# Patient Record
Sex: MALE | Race: WHITE | Employment: UNEMPLOYED | URBAN - METROPOLITAN AREA
[De-identification: names, ages, dates, MRNs, and addresses within clinical notes are randomized per-mention and may not be internally consistent; named-entity substitution may affect disease eponyms.]

---

## 2018-04-29 ENCOUNTER — ED HISTORICAL/CONVERTED ENCOUNTER (OUTPATIENT)
Dept: OTHER | Age: 28
End: 2018-04-29

## 2019-03-19 ENCOUNTER — HOSPITAL ENCOUNTER (EMERGENCY)
Age: 29
Discharge: HOME | End: 2019-03-19
Attending: EMERGENCY MEDICINE
Payer: MEDICARE

## 2019-03-19 ENCOUNTER — APPOINTMENT (OUTPATIENT)
Dept: GENERAL RADIOLOGY | Age: 29
End: 2019-03-19
Attending: EMERGENCY MEDICINE
Payer: MEDICARE

## 2019-03-19 VITALS
RESPIRATION RATE: 19 BRPM | DIASTOLIC BLOOD PRESSURE: 120 MMHG | BODY MASS INDEX: 35.21 KG/M2 | WEIGHT: 260 LBS | HEART RATE: 103 BPM | HEIGHT: 72 IN | TEMPERATURE: 97.6 F | OXYGEN SATURATION: 95 % | SYSTOLIC BLOOD PRESSURE: 174 MMHG

## 2019-03-19 DIAGNOSIS — T50.901A DRUG OVERDOSE, ACCIDENTAL OR UNINTENTIONAL, INITIAL ENCOUNTER: Primary | ICD-10-CM

## 2019-03-19 LAB
ALBUMIN SERPL-MCNC: 3.8 G/DL (ref 3.5–5)
ALBUMIN/GLOB SERPL: 1.1 {RATIO} (ref 1.1–2.2)
ALP SERPL-CCNC: 105 U/L (ref 45–117)
ALT SERPL-CCNC: 52 U/L (ref 12–78)
ANION GAP SERPL CALC-SCNC: 7 MMOL/L (ref 5–15)
AST SERPL-CCNC: 46 U/L (ref 15–37)
BASE DEFICIT BLDV-SCNC: 1.8 MMOL/L
BASOPHILS # BLD: 0 K/UL (ref 0–0.1)
BASOPHILS NFR BLD: 0 % (ref 0–1)
BDY SITE: NORMAL
BILIRUB SERPL-MCNC: 0.1 MG/DL (ref 0.2–1)
BUN SERPL-MCNC: 10 MG/DL (ref 6–20)
BUN/CREAT SERPL: 10 (ref 12–20)
CALCIUM SERPL-MCNC: 8.5 MG/DL (ref 8.5–10.1)
CHLORIDE SERPL-SCNC: 106 MMOL/L (ref 97–108)
CO2 SERPL-SCNC: 25 MMOL/L (ref 21–32)
COMMENT, HOLDF: NORMAL
CREAT SERPL-MCNC: 1.05 MG/DL (ref 0.7–1.3)
DIFFERENTIAL METHOD BLD: ABNORMAL
EOSINOPHIL # BLD: 0 K/UL (ref 0–0.4)
EOSINOPHIL NFR BLD: 0 % (ref 0–7)
ERYTHROCYTE [DISTWIDTH] IN BLOOD BY AUTOMATED COUNT: 13.6 % (ref 11.5–14.5)
ETHANOL SERPL-MCNC: <10 MG/DL
GLOBULIN SER CALC-MCNC: 3.5 G/DL (ref 2–4)
GLUCOSE SERPL-MCNC: 179 MG/DL (ref 65–100)
HCO3 BLDV-SCNC: 24 MMOL/L (ref 23–28)
HCT VFR BLD AUTO: 42.1 % (ref 36.6–50.3)
HGB BLD-MCNC: 13.5 G/DL (ref 12.1–17)
IMM GRANULOCYTES # BLD AUTO: 0 K/UL
IMM GRANULOCYTES NFR BLD AUTO: 0 %
LYMPHOCYTES # BLD: 2 K/UL (ref 0.8–3.5)
LYMPHOCYTES NFR BLD: 23 % (ref 12–49)
MCH RBC QN AUTO: 27.1 PG (ref 26–34)
MCHC RBC AUTO-ENTMCNC: 32.1 G/DL (ref 30–36.5)
MCV RBC AUTO: 84.5 FL (ref 80–99)
MONOCYTES # BLD: 1.1 K/UL (ref 0–1)
MONOCYTES NFR BLD: 12 % (ref 5–13)
NEUTS BAND NFR BLD MANUAL: 2 % (ref 0–6)
NEUTS SEG # BLD: 5.7 K/UL (ref 1.8–8)
NEUTS SEG NFR BLD: 63 % (ref 32–75)
NRBC # BLD: 0 K/UL (ref 0–0.01)
NRBC BLD-RTO: 0 PER 100 WBC
PCO2 BLDV: 45 MMHG (ref 41–51)
PH BLDV: 7.35 [PH] (ref 7.32–7.42)
PLATELET # BLD AUTO: 223 K/UL (ref 150–400)
PMV BLD AUTO: 9.5 FL (ref 8.9–12.9)
PO2 BLDV: 38 MMHG (ref 25–40)
POTASSIUM SERPL-SCNC: 4.5 MMOL/L (ref 3.5–5.1)
PROT SERPL-MCNC: 7.3 G/DL (ref 6.4–8.2)
RBC # BLD AUTO: 4.98 M/UL (ref 4.1–5.7)
RBC MORPH BLD: ABNORMAL
SAMPLES BEING HELD,HOLD: NORMAL
SAO2 % BLDV: 70 % (ref 65–88)
SAO2% DEVICE SAO2% SENSOR NAME: NORMAL
SODIUM SERPL-SCNC: 138 MMOL/L (ref 136–145)
SPECIMEN SITE: NORMAL
WBC # BLD AUTO: 8.8 K/UL (ref 4.1–11.1)

## 2019-03-19 PROCEDURE — 36415 COLL VENOUS BLD VENIPUNCTURE: CPT

## 2019-03-19 PROCEDURE — 74011250637 HC RX REV CODE- 250/637: Performed by: EMERGENCY MEDICINE

## 2019-03-19 PROCEDURE — 82803 BLOOD GASES ANY COMBINATION: CPT

## 2019-03-19 PROCEDURE — 85025 COMPLETE CBC W/AUTO DIFF WBC: CPT

## 2019-03-19 PROCEDURE — 99285 EMERGENCY DEPT VISIT HI MDM: CPT

## 2019-03-19 PROCEDURE — 80053 COMPREHEN METABOLIC PANEL: CPT

## 2019-03-19 PROCEDURE — 71045 X-RAY EXAM CHEST 1 VIEW: CPT

## 2019-03-19 PROCEDURE — 80307 DRUG TEST PRSMV CHEM ANLYZR: CPT

## 2019-03-19 PROCEDURE — 93005 ELECTROCARDIOGRAM TRACING: CPT

## 2019-03-19 RX ORDER — ONDANSETRON 4 MG/1
8 TABLET, ORALLY DISINTEGRATING ORAL
Status: COMPLETED | OUTPATIENT
Start: 2019-03-19 | End: 2019-03-19

## 2019-03-19 RX ORDER — NALOXONE HYDROCHLORIDE 1 MG/ML
INJECTION INTRAMUSCULAR; INTRAVENOUS; SUBCUTANEOUS
Status: DISCONTINUED
Start: 2019-03-19 | End: 2019-03-19 | Stop reason: HOSPADM

## 2019-03-19 RX ORDER — ONDANSETRON 4 MG/1
TABLET, ORALLY DISINTEGRATING ORAL
Status: DISCONTINUED
Start: 2019-03-19 | End: 2019-03-19 | Stop reason: HOSPADM

## 2019-03-19 RX ADMIN — ONDANSETRON 8 MG: 4 TABLET, ORALLY DISINTEGRATING ORAL at 20:12

## 2019-03-19 NOTE — DISCHARGE INSTRUCTIONS
Stop using Kratom and Marijuana. Patient Education        Opioid Overdose: Care Instructions  Your Care Instructions    You have had treatment to help your body recover from taking too much of an opioid. You are getting better, but you may not feel well for a while. It takes time for the opioids to leave your body. How long it takes to feel better depends on which drug you took and how much you took of it. Opioids include illegal drugs such as heroin, often called smack, junk, H, and ska. Opioids also include medicines that doctors prescribe to treat pain. These are medicines such as oxycodone, methadone, and buprenorphine. They are sometimes sold and used illegally. Taking too much of an opioid can be dangerous. It may cause:  · Trouble breathing. · Low blood pressure. · A low heart rate. · A coma. When the doctor treated you for the overdose, he or she may have:  · Watched your symptoms or done tests to find out what kind of drug you took. · Given you fluids. · Given you oxygen to help you breathe. · Given you a medicine called naloxone to help reverse the effects of the opioid. · Done several tests, including blood tests, to see how you're responding to treatment. The doctor also watched you carefully to make sure you were recovering safely. Follow-up care is a key part of your treatment and safety. Be sure to make and go to all appointments, and call your doctor if you are having problems. It's also a good idea to know your test results and keep a list of the medicines you take. How can you care for yourself at home? · If you take opioids regularly, your body gets used to them. This is called dependency. If you are dependent on this drug, you may have withdrawal symptoms when you stop taking it. These can include nausea, sweating, chills, diarrhea, stomach cramps, and muscle aches. Withdrawal can last up to several weeks, depending on which drug you took and how long you took it.  You may feel very ill, but you are probably not in medical danger. · Your doctor may give you medicine to help you feel better. To help get through withdrawal, you can also:  ? Get plenty of rest.  ? Drink plenty of fluids. ? Stay active, but don't tire yourself. ? Eat a healthy diet. · If you had a tube in your throat to help you breathe, you may have a sore throat or hoarseness that can last a few days. Sip liquids to help soothe your throat. · Do not drink alcohol or take illegal drugs. · Do not take medications that make you tired, like sleeping pills or muscle relaxers. · Do not drive if you feel sleepy or groggy while you recover from your overdose. · Get help to stop using drugs. Talk to your doctor about drug treatment programs. · Talk to your doctor or pharmacist about having a naloxone rescue kit on hand. When should you call for help? Call 911 anytime you think you may need emergency care. For example, call if:    · You feel you cannot stop from hurting yourself or someone else.   [facility] your doctor now or seek immediate medical care if:    · You have new or worse withdrawal symptoms, such as:  ? Stomach cramps. ? Vomiting. ? Diarrhea. ? Muscle aches. ? Sweating.    Watch closely for changes in your health, and be sure to contact your doctor if:    · You do not get better as expected. Where can you learn more? Go to http://skyler-mike.info/. Enter 078 [id number] in the search box to learn more about \"Opioid Overdose: Care Instructions. \"  Current as of: May 7, 2018  Content Version: 11.9  © 3858-4163 [employer]. Care instructions adapted under license by [employer] (which disclaims liability or warranty for this information). If you have questions about a medical condition or this instruction, always ask your healthcare professional. [address] any warranty or liability for your use of this information.

## 2019-03-19 NOTE — ED NOTES
Bedside and Verbal shift change report given to LEENA Angelo (oncoming nurse) by Clair Gannon RN (offgoing nurse). Report included the following information SBAR, ED Summary, Procedure Summary, MAR, Recent Results and Med Rec Status.

## 2019-03-19 NOTE — ED PROVIDER NOTES
34 y/o M with significant PMH for bipolar present with drug overdose. On presentation to ED, pt alert, oriented, but drowsy and complaining of SOB, worsened by talking. Pt noted by friend to be down and unresponsive early this afternoon, called 911, EMS on scene administered 8mg nasal Narcan with return of spontaneous respiration. Pt admits to taking Kratom today \"2-3 spoonfulls\" in his tea. Pt usually takes 2-3 spoonfulls every other day for pain and \"energy\" and denies taking more than usual. Pt admits to marijuana use, reports last use 3 days ago. Pt denies any use of narcotic or heroin. Pt denies any alcohol or tobacco use. Pt denies any recent illness, fever, chest pain, abdominal pain, nausea, vomiting, numbness, or weakness. Janna Staples MD. Pt seen and examined with Dr. Julia Swanson. History obtained from patient and EMS. Past Medical History:  
Diagnosis Date  Psychiatric disorder   
 bipolar No past surgical history on file. No family history on file. Social History Socioeconomic History  Marital status: SINGLE Spouse name: Not on file  Number of children: Not on file  Years of education: Not on file  Highest education level: Not on file Social Needs  Financial resource strain: Not on file  Food insecurity - worry: Not on file  Food insecurity - inability: Not on file  Transportation needs - medical: Not on file  Transportation needs - non-medical: Not on file Occupational History  Not on file Tobacco Use  Smoking status: Current Every Day Smoker Years: 0.50 Substance and Sexual Activity  Alcohol use: No  
 Drug use: No  
 Sexual activity: Not on file Other Topics Concern  Not on file Social History Narrative  Not on file ALLERGIES: Patient has no known allergies. Review of Systems Constitutional: Positive for chills. Negative for fever. HENT: Negative for congestion. Eyes: Negative for visual disturbance. Respiratory: Positive for shortness of breath. Negative for cough and choking. Cardiovascular: Negative for chest pain. Gastrointestinal: Negative for abdominal pain, nausea and vomiting. Genitourinary: Negative for difficulty urinating and dysuria. Musculoskeletal: Negative for myalgias. Neurological: Positive for light-headedness. Negative for seizures, speech difficulty and numbness. Vitals:  
 03/19/19 1631 03/19/19 1633 03/19/19 1639 BP: (!) 148/107 (!) 148/107 Pulse: 97 92 100 Resp: 19 24 16 Temp:  97.6 °F (36.4 °C) SpO2: 99% 100% 98% Weight:  117.9 kg (260 lb) Height:  6' (1.829 m) Physical Exam  
Constitutional: He is oriented to person, place, and time. He appears well-developed and well-nourished. No distress. Drowsy HENT:  
Head: Normocephalic and atraumatic. Right Ear: External ear normal.  
Left Ear: External ear normal.  
Nose: Nose normal.  
Mouth/Throat: Oropharynx is clear and moist.  
Eyes: Pupils are equal, round, and reactive to light. EOM are normal.  
Neck: Normal range of motion. Neck supple. Cardiovascular: Normal rate, regular rhythm, normal heart sounds and intact distal pulses. No murmur heard. Pulmonary/Chest: Breath sounds normal. No respiratory distress. He has no wheezes. Abdominal: Soft. Bowel sounds are normal. He exhibits no distension. There is no tenderness. Musculoskeletal: Normal range of motion. He exhibits no edema. Lymphadenopathy:  
  He has no cervical adenopathy. Neurological: He is alert and oriented to person, place, and time. Skin: Skin is warm and dry. He is not diaphoretic. Vitals reviewed. MDM Number of Diagnoses or Management Options Drug overdose, accidental or unintentional, initial encounter: new and requires workup Diagnosis management comments: A/P: 34 y/o M with PMH of bipolar presents with drug overdose, found down by EMS, return of spontaneous respiration after administration of Narcan. Pt admits to taking Kratom this morning which is likely cause of overdose. Will get EKG, CXR, capnography, etoh, VBG, CBC, CMP. Amount and/or Complexity of Data Reviewed Tests in the radiology section of CPT®: ordered and reviewed Tests in the medicine section of CPT®: ordered and reviewed Patient Progress Patient progress: resolved Procedures 2026 - Pt vitals stable, more alert, tolerated ambulation well, O2 stable on room air. Pt stable for discharge. Trevor Agudelo MD 
PGY-1 Family Medicine Resident

## 2019-03-19 NOTE — ED NOTES
Pt states he is unable to walk at this time because he is feeling dizzy. Provider at the bedside and aware. Handed pt crackers for PO challenge.

## 2019-03-19 NOTE — ED TRIAGE NOTES
Pt here after being down by friend. Called as cardiac arrest.  [employer] [facility] administered 8 mg Narcan nasally with return of spontaneous resp. Pt denies use of narcotic or heroin. Pt admits to taking Kratom today which is mixture of acid and marijuana. Pt on arrival alert  And oriented x4 states that he is feeling tired and short of breath.

## 2019-03-19 NOTE — ED NOTES
ED EKG interpretation: 
Rhythm: normal sinus rhythm; and regular . Rate (approx.): 99; Axis: normal; P wave: normal; QRS interval: normal ; ST/T wave: normal; This EKG was interpreted by Enrrique Thao DO,ED Provider.

## 2019-03-20 LAB
ATRIAL RATE: 99 BPM
CALCULATED P AXIS, ECG09: 52 DEGREES
CALCULATED R AXIS, ECG10: 64 DEGREES
CALCULATED T AXIS, ECG11: 34 DEGREES
DIAGNOSIS, 93000: NORMAL
P-R INTERVAL, ECG05: 202 MS
Q-T INTERVAL, ECG07: 358 MS
QRS DURATION, ECG06: 94 MS
QTC CALCULATION (BEZET), ECG08: 459 MS
VENTRICULAR RATE, ECG03: 99 BPM

## 2019-06-01 ENCOUNTER — HOSPITAL ENCOUNTER (EMERGENCY)
Age: 29
Discharge: OTHER INSTITUTION | End: 2019-06-02
Attending: EMERGENCY MEDICINE | Admitting: EMERGENCY MEDICINE
Payer: MEDICARE

## 2019-06-01 DIAGNOSIS — F31.9 BIPOLAR AFFECTIVE DISORDER, REMISSION STATUS UNSPECIFIED (HCC): Primary | ICD-10-CM

## 2019-06-01 DIAGNOSIS — R45.89 SUICIDAL BEHAVIOR WITHOUT ATTEMPTED SELF-INJURY: ICD-10-CM

## 2019-06-01 PROCEDURE — 36415 COLL VENOUS BLD VENIPUNCTURE: CPT

## 2019-06-01 PROCEDURE — 80307 DRUG TEST PRSMV CHEM ANLYZR: CPT

## 2019-06-01 PROCEDURE — 99285 EMERGENCY DEPT VISIT HI MDM: CPT

## 2019-06-02 VITALS
HEART RATE: 85 BPM | SYSTOLIC BLOOD PRESSURE: 144 MMHG | BODY MASS INDEX: 36.57 KG/M2 | OXYGEN SATURATION: 96 % | WEIGHT: 270 LBS | HEIGHT: 72 IN | TEMPERATURE: 97.5 F | RESPIRATION RATE: 18 BRPM | DIASTOLIC BLOOD PRESSURE: 95 MMHG

## 2019-06-02 LAB
ALBUMIN SERPL-MCNC: 4.7 G/DL (ref 3.5–5)
ALBUMIN/GLOB SERPL: 1.5 {RATIO} (ref 1.1–2.2)
ALP SERPL-CCNC: 120 U/L (ref 45–117)
ALT SERPL-CCNC: 34 U/L (ref 12–78)
AMPHET UR QL SCN: NEGATIVE
ANION GAP SERPL CALC-SCNC: 16 MMOL/L (ref 5–15)
APPEARANCE UR: ABNORMAL
AST SERPL-CCNC: 28 U/L (ref 15–37)
BACTERIA URNS QL MICRO: NEGATIVE /HPF
BARBITURATES UR QL SCN: NEGATIVE
BASOPHILS # BLD: 0.1 K/UL (ref 0–0.1)
BASOPHILS NFR BLD: 1 % (ref 0–1)
BENZODIAZ UR QL: NEGATIVE
BILIRUB SERPL-MCNC: 0.4 MG/DL (ref 0.2–1)
BILIRUB UR QL: NEGATIVE
BUN SERPL-MCNC: 11 MG/DL (ref 6–20)
BUN/CREAT SERPL: 13 (ref 12–20)
CALCIUM SERPL-MCNC: 8.8 MG/DL (ref 8.5–10.1)
CANNABINOIDS UR QL SCN: POSITIVE
CHLORIDE SERPL-SCNC: 104 MMOL/L (ref 97–108)
CO2 SERPL-SCNC: 21 MMOL/L (ref 21–32)
COCAINE UR QL SCN: NEGATIVE
COLOR UR: ABNORMAL
CREAT SERPL-MCNC: 0.85 MG/DL (ref 0.7–1.3)
DIFFERENTIAL METHOD BLD: ABNORMAL
DRUG SCRN COMMENT,DRGCM: ABNORMAL
EOSINOPHIL # BLD: 0 K/UL (ref 0–0.4)
EOSINOPHIL NFR BLD: 0 % (ref 0–7)
EPITH CASTS URNS QL MICRO: NORMAL /LPF
ERYTHROCYTE [DISTWIDTH] IN BLOOD BY AUTOMATED COUNT: 14.2 % (ref 11.5–14.5)
ETHANOL SERPL-MCNC: 66 MG/DL
GLOBULIN SER CALC-MCNC: 3.2 G/DL (ref 2–4)
GLUCOSE SERPL-MCNC: 91 MG/DL (ref 65–100)
GLUCOSE UR STRIP.AUTO-MCNC: NEGATIVE MG/DL
HCT VFR BLD AUTO: 47.2 % (ref 36.6–50.3)
HGB BLD-MCNC: 15.3 G/DL (ref 12.1–17)
HGB UR QL STRIP: NEGATIVE
IMM GRANULOCYTES # BLD AUTO: 0.1 K/UL (ref 0–0.04)
IMM GRANULOCYTES NFR BLD AUTO: 1 % (ref 0–0.5)
KETONES UR QL STRIP.AUTO: ABNORMAL MG/DL
LEUKOCYTE ESTERASE UR QL STRIP.AUTO: NEGATIVE
LYMPHOCYTES # BLD: 3 K/UL (ref 0.8–3.5)
LYMPHOCYTES NFR BLD: 28 % (ref 12–49)
MCH RBC QN AUTO: 27.3 PG (ref 26–34)
MCHC RBC AUTO-ENTMCNC: 32.4 G/DL (ref 30–36.5)
MCV RBC AUTO: 84.3 FL (ref 80–99)
METHADONE UR QL: NEGATIVE
MONOCYTES # BLD: 0.9 K/UL (ref 0–1)
MONOCYTES NFR BLD: 8 % (ref 5–13)
NEUTS SEG # BLD: 6.7 K/UL (ref 1.8–8)
NEUTS SEG NFR BLD: 62 % (ref 32–75)
NITRITE UR QL STRIP.AUTO: NEGATIVE
NRBC # BLD: 0 K/UL (ref 0–0.01)
NRBC BLD-RTO: 0 PER 100 WBC
OPIATES UR QL: NEGATIVE
PCP UR QL: NEGATIVE
PH UR STRIP: 5.5 [PH] (ref 5–8)
PLATELET # BLD AUTO: 250 K/UL (ref 150–400)
PMV BLD AUTO: 9 FL (ref 8.9–12.9)
POTASSIUM SERPL-SCNC: 4 MMOL/L (ref 3.5–5.1)
PROT SERPL-MCNC: 7.9 G/DL (ref 6.4–8.2)
PROT UR STRIP-MCNC: 30 MG/DL
RBC # BLD AUTO: 5.6 M/UL (ref 4.1–5.7)
RBC #/AREA URNS HPF: NORMAL /HPF (ref 0–5)
SODIUM SERPL-SCNC: 141 MMOL/L (ref 136–145)
SP GR UR REFRACTOMETRY: 1.02 (ref 1–1.03)
UROBILINOGEN UR QL STRIP.AUTO: 0.2 EU/DL (ref 0.2–1)
WBC # BLD AUTO: 10.7 K/UL (ref 4.1–11.1)
WBC URNS QL MICRO: NORMAL /HPF (ref 0–4)

## 2019-06-02 PROCEDURE — 36415 COLL VENOUS BLD VENIPUNCTURE: CPT

## 2019-06-02 PROCEDURE — 80307 DRUG TEST PRSMV CHEM ANLYZR: CPT

## 2019-06-02 PROCEDURE — 81001 URINALYSIS AUTO W/SCOPE: CPT

## 2019-06-02 PROCEDURE — 93005 ELECTROCARDIOGRAM TRACING: CPT

## 2019-06-02 PROCEDURE — 85025 COMPLETE CBC W/AUTO DIFF WBC: CPT

## 2019-06-02 PROCEDURE — 80053 COMPREHEN METABOLIC PANEL: CPT

## 2019-06-02 NOTE — ED NOTES
Pt. To be transferred to [address] [place] [address] Unit Room 205B with nurse to nurse report to be called to [phone].

## 2019-06-02 NOTE — ED NOTES
Pt continues to rest quietly in bed in position of comfort. Updated on plan of care. Call bell within reach. Pt is to transfer to [facility] as of this time.

## 2019-06-02 NOTE — ED NOTES
TRANSFER - OUT REPORT:    Verbal report given to Estefania Pritchard RN (name) on China Ferrer  being transferred to [facility](unit) for routine progression of care       Report consisted of patients Situation, Background, Assessment and   Recommendations(SBAR). Information from the following report(s) SBAR, ED Summary, Procedure Summary, MAR and Recent Results was reviewed with the receiving nurse. Lines:       Opportunity for questions and clarification was provided.       Patient transported with:  Angelica

## 2019-06-02 NOTE — ED NOTES
Attempted to call report to Suzi Ariza at this time. Nurse states will be leaving and not present when patient is to arrive after 8:00AM and to please call back to the nurse who will be receiving. Charge nurse informed.

## 2019-06-02 NOTE — ED PROVIDER NOTES
EMERGENCY DEPARTMENT HISTORY AND PHYSICAL EXAM      Date: 6/1/2019  Patient Name: Harsh Elizabeth    History of Presenting Illness     Chief Complaint   Patient presents with   [address] Problem       History Provided By: Patient    HPI: Harsh Elizabeth, 33 y.o. male with PMHx significant for bipolar disorder and substance abuse who presents as an ECO. Patient reportedly was threatening to harm himself and his family. He was therefore made an ECO and brought to the ED for evaluation and medical clearance. To me, patient denies any complaints. He denies any suicidal or homicidal thoughts. He states he just wants to be left alone because \"people annoy [him]. \"  He denies any chest pain, shortness breath, abdominal pain, fever, recent illness      PCP: None    There are no other complaints, changes, or physical findings at this time. Past History     Past Medical History:  Past Medical History:   Diagnosis Date    Bipolar 1 disorder ([facility] [address]      Past Surgical History:  History reviewed. No pertinent surgical history. Family History:  History reviewed. No pertinent family history. Social History:  Social History     Tobacco Use    Smoking status: Former Smoker    Smokeless tobacco: Never Used   Substance Use Topics    Alcohol use: Yes     Alcohol/week: 4.2 oz     Types: 7 Cans of beer per week     Comment: 2-3 weeks ago    Drug use: Yes     Types: Marijuana, Heroin, Cocaine     Comment: Pt. states last use 7 mos ago     Allergies:  No Known Allergies  Review of Systems   Review of Systems   Constitutional: Negative for chills and fever. HENT: Negative for congestion, rhinorrhea and sore throat. Respiratory: Negative for cough and shortness of breath. Cardiovascular: Negative for chest pain. Gastrointestinal: Negative for abdominal pain, nausea and vomiting. Genitourinary: Negative for dysuria and urgency. Skin: Negative for rash.    Neurological: Negative for dizziness, light-headedness and headaches. All other systems reviewed and are negative. Physical Exam   Physical Exam   Constitutional: He is oriented to person, place, and time. He appears well-developed and well-nourished. No distress. HENT:   Head: Normocephalic and atraumatic. Eyes: Pupils are equal, round, and reactive to light. Conjunctivae and EOM are normal.   Neck: Normal range of motion. Cardiovascular: Normal rate, regular rhythm and intact distal pulses. Pulmonary/Chest: Effort normal and breath sounds normal. No stridor. No respiratory distress. Abdominal: Soft. He exhibits no distension. There is no tenderness. Musculoskeletal: Normal range of motion. Neurological: He is alert and oriented to person, place, and time. Skin: Skin is warm and dry. Psychiatric: His affect is angry. Nursing note and vitals reviewed.     Diagnostic Study Results   Labs -     Recent Results (from the past 12 hour(s))   ETHYL ALCOHOL    Collection Time: 06/01/19 11:23 PM   Result Value Ref Range    ALCOHOL(ETHYL),SERUM 66 (H) <10 MG/DL   DRUG SCREEN, URINE    Collection Time: 06/02/19  1:53 AM   Result Value Ref Range    AMPHETAMINES NEGATIVE  NEG      BARBITURATES NEGATIVE  NEG      BENZODIAZEPINES NEGATIVE  NEG      COCAINE NEGATIVE  NEG      METHADONE NEGATIVE  NEG      OPIATES NEGATIVE  NEG      PCP(PHENCYCLIDINE) NEGATIVE  NEG      THC (TH-CANNABINOL) POSITIVE (A) NEG      Drug screen comment (NOTE)    URINALYSIS W/ RFLX MICROSCOPIC    Collection Time: 06/02/19  1:53 AM   Result Value Ref Range    Color YELLOW/STRAW      Appearance CLOUDY (A) CLEAR      Specific gravity 1.020 1.003 - 1.030      pH (UA) 5.5 5.0 - 8.0      Protein 30 (A) NEG mg/dL    Glucose NEGATIVE  NEG mg/dL    Ketone TRACE (A) NEG mg/dL    Bilirubin NEGATIVE  NEG      Blood NEGATIVE  NEG      Urobilinogen 0.2 0.2 - 1.0 EU/dL    Nitrites NEGATIVE  NEG      Leukocyte Esterase NEGATIVE  NEG     URINE MICROSCOPIC ONLY    Collection Time: 06/02/19  1:53 AM   Result Value Ref Range    WBC 0-4 0 - 4 /hpf    RBC 0-5 0 - 5 /hpf    Epithelial cells FEW FEW /lpf    Bacteria NEGATIVE  NEG /hpf   CBC WITH AUTOMATED DIFF    Collection Time: 06/02/19  3:05 AM   Result Value Ref Range    WBC 10.7 4.1 - 11.1 K/uL    RBC 5.60 4.10 - 5.70 M/uL    HGB 15.3 12.1 - 17.0 g/dL    HCT 47.2 36.6 - 50.3 %    MCV 84.3 80.0 - 99.0 FL    MCH 27.3 26.0 - 34.0 PG    MCHC 32.4 30.0 - 36.5 g/dL    RDW 14.2 11.5 - 14.5 %    PLATELET 336 028 - 553 K/uL    MPV 9.0 8.9 - 12.9 FL    NRBC 0.0 0  WBC    ABSOLUTE NRBC 0.00 0.00 - 0.01 K/uL    NEUTROPHILS 62 32 - 75 %    LYMPHOCYTES 28 12 - 49 %    MONOCYTES 8 5 - 13 %    EOSINOPHILS 0 0 - 7 %    BASOPHILS 1 0 - 1 %    IMMATURE GRANULOCYTES 1 (H) 0.0 - 0.5 %    ABS. NEUTROPHILS 6.7 1.8 - 8.0 K/UL    ABS. LYMPHOCYTES 3.0 0.8 - 3.5 K/UL    ABS. MONOCYTES 0.9 0.0 - 1.0 K/UL    ABS. EOSINOPHILS 0.0 0.0 - 0.4 K/UL    ABS. BASOPHILS 0.1 0.0 - 0.1 K/UL    ABS. IMM. GRANS. 0.1 (H) 0.00 - 0.04 K/UL    DF AUTOMATED     METABOLIC PANEL, COMPREHENSIVE    Collection Time: 06/02/19  3:05 AM   Result Value Ref Range    Sodium 141 136 - 145 mmol/L    Potassium 4.0 3.5 - 5.1 mmol/L    Chloride 104 97 - 108 mmol/L    CO2 21 21 - 32 mmol/L    Anion gap 16 (H) 5 - 15 mmol/L    Glucose 91 65 - 100 mg/dL    BUN 11 6 - 20 MG/DL    Creatinine 0.85 0.70 - 1.30 MG/DL    BUN/Creatinine ratio 13 12 - 20      GFR est AA >60 >60 ml/min/1.73m2    GFR est non-AA >60 >60 ml/min/1.73m2    Calcium 8.8 8.5 - 10.1 MG/DL    Bilirubin, total 0.4 0.2 - 1.0 MG/DL    ALT (SGPT) 34 12 - 78 U/L    AST (SGOT) 28 15 - 37 U/L    Alk.  phosphatase 120 (H) 45 - 117 U/L    Protein, total 7.9 6.4 - 8.2 g/dL    Albumin 4.7 3.5 - 5.0 g/dL    Globulin 3.2 2.0 - 4.0 g/dL    A-G Ratio 1.5 1.1 - 2.2     EKG, 12 LEAD, INITIAL    Collection Time: 06/02/19  3:15 AM   Result Value Ref Range    Ventricular Rate 83 BPM    Atrial Rate 83 BPM    P-R Interval 200 ms    QRS Duration 86 ms    Q-T Interval 378 ms    QTC Calculation (Bezet) 444 ms    Calculated P Axis 71 degrees    Calculated R Axis 62 degrees    Calculated T Axis 41 degrees    Diagnosis       Normal sinus rhythm  Normal ECG  No previous ECGs available         Radiologic Studies -   No orders to display     No results found. Medical Decision Making   I am the first provider for this patient. I reviewed the vital signs, available nursing notes, past medical history, past surgical history, family history and social history. Vital Signs-Reviewed the patient's vital signs. Patient Vitals for the past 12 hrs:   Temp Pulse Resp BP SpO2   06/02/19 0418 97.7 °F (36.5 °C) 90 16 133/89 97 %   06/02/19 0150 97.9 °F (36.6 °C) 79 18 (!) 155/91 99 %   06/01/19 2339     96 %   06/01/19 2306 98.9 °F (37.2 °C) (!) 133 20 (!) 153/101 96 %       Pulse Oximetry Analysis - 99% on RA    Cardiac Monitor:   Rate: 79 bpm  Rhythm: Normal Sinus Rhythm      ED EKG interpretation:  Rhythm: normal sinus; and regular . Rate (approx.): 83; Axis: normal; P wave: normal; QRS interval: normal ; ST/T wave: normal; Other findings: normal. This EKG was interpreted by BOSSMAN Horn MD,ED Provider. Records Reviewed: Nursing Notes    Provider Notes (Medical Decision Making):   Patient presents as an ECO for threatening to harm himself and family. Will check alcohol level, UDS for medical clearance. Crisis is already in the emergency department for evaluation. ED Course:   Initial assessment performed. The patients presenting problems have been discussed, and they are in agreement with the care plan formulated and outlined with them. I have encouraged them to ask questions as they arise throughout their visit.     ED Course as of Jun 02 0559   Sat Jun 01, 2019   2340 Per crisis, patient is well known to them with a history of bipolar disorder, they plan for a tdo    [SURINDER]   Sun Jun 02, 2019   0327 Patient is medically cleared    Acacia Dougherty Patient accepted to Dr. Kory Gr [SURINDER]      ED Course User Index  Mehrdad Vang MD       Critical Care:  CRITICAL CARE NOTE :  IMPENDING DETERIORATION -CNS  ASSOCIATED RISK FACTORS - psychiatric decompensation  MANAGEMENT- Bedside Assessment and Transfer  INTERPRETATION -  Blood Pressure and labs  INTERVENTIONS - transfer  CASE REVIEW - Nursing and Crisis  TREATMENT RESPONSE -Stable  PERFORMED BY - Self    NOTES   :    I have spent 30 minutes of critical care time involved in lab review, consultations with specialist, family decision- making, bedside attention and documentation. During this entire length of time I was immediately available to the patient . Zohreh Gaytan MD      Disposition:  Transfer to [facility]    Diagnosis     Clinical Impression:   1. Bipolar affective disorder, remission status unspecified ([facility] [address]    2. Suicidal behavior without attempted self-injury        This note will not be viewable in [address] Please note that this dictation was completed with [employer], the computer voice recognition software. Quite often unanticipated grammatical, syntax, homophones, and other interpretive errors are inadvertently transcribed by the computer software. Please disregard these errors.   Please excuse any errors that have escaped final proofreading
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
## 2019-06-03 LAB
ATRIAL RATE: 83 BPM
CALCULATED P AXIS, ECG09: 71 DEGREES
CALCULATED R AXIS, ECG10: 62 DEGREES
CALCULATED T AXIS, ECG11: 41 DEGREES
DIAGNOSIS, 93000: NORMAL
P-R INTERVAL, ECG05: 200 MS
Q-T INTERVAL, ECG07: 378 MS
QRS DURATION, ECG06: 86 MS
QTC CALCULATION (BEZET), ECG08: 444 MS
VENTRICULAR RATE, ECG03: 83 BPM

## 2021-08-28 ENCOUNTER — HOSPITAL ENCOUNTER (EMERGENCY)
Age: 31
Discharge: HOME | End: 2021-08-28
Attending: EMERGENCY MEDICINE
Payer: MEDICARE

## 2021-08-28 ENCOUNTER — APPOINTMENT (OUTPATIENT)
Dept: GENERAL RADIOLOGY | Age: 31
End: 2021-08-28
Attending: EMERGENCY MEDICINE
Payer: MEDICARE

## 2021-08-28 VITALS
SYSTOLIC BLOOD PRESSURE: 121 MMHG | TEMPERATURE: 98 F | BODY MASS INDEX: 29.39 KG/M2 | HEART RATE: 80 BPM | RESPIRATION RATE: 18 BRPM | OXYGEN SATURATION: 98 % | WEIGHT: 217 LBS | HEIGHT: 72 IN | DIASTOLIC BLOOD PRESSURE: 60 MMHG

## 2021-08-28 DIAGNOSIS — S20.219A CONTUSION OF CHEST WALL, UNSPECIFIED LATERALITY, INITIAL ENCOUNTER: Primary | ICD-10-CM

## 2021-08-28 PROCEDURE — 99282 EMERGENCY DEPT VISIT SF MDM: CPT

## 2021-08-28 PROCEDURE — 71046 X-RAY EXAM CHEST 2 VIEWS: CPT

## 2021-08-28 PROCEDURE — 77030027138 HC INCENT SPIROMETER -A

## 2021-08-28 RX ORDER — LIDOCAINE 50 MG/G
PATCH TOPICAL
Qty: 15 EACH | Refills: 0 | Status: ON HOLD | OUTPATIENT
Start: 2021-08-28 | End: 2021-12-15

## 2021-08-28 NOTE — ED TRIAGE NOTES
Pt arrives with bilateral rib pain after falling up stairs 4 days ago. Pt reports pain when taking deep breaths.

## 2021-09-04 NOTE — ED PROVIDER NOTES
The history is provided by the patient. Rib Pain  This is a new problem. The average episode lasts 3 days. The problem occurs continuously. The problem has not changed since onset. Associated symptoms include chest pain. Pertinent negatives include no fever and no cough. It is unknown what precipitated the problem. Past Medical History:   Diagnosis Date    Bipolar 1 disorder ([facility] [address]     Psychiatric disorder     bipolar       No past surgical history on file. No family history on file. Social History     Socioeconomic History    Marital status: SINGLE     Spouse name: Not on file    Number of children: Not on file    Years of education: Not on file    Highest education level: Not on file   Occupational History    Not on file   Tobacco Use    Smoking status: Former Smoker    Smokeless tobacco: Never Used   Substance and Sexual Activity    Alcohol use: Yes     Alcohol/week: 7.0 standard drinks     Types: 7 Cans of beer per week     Comment: 2-3 weeks ago    Drug use: Yes     Types: Marijuana, Heroin, Cocaine     Comment: Pt. states last use 7 mos ago    Sexual activity: Not Currently   Other Topics Concern    Not on file   Social History Narrative    ** Merged History Encounter **          Social Determinants of Health     Financial Resource Strain:     Difficulty of Paying Living Expenses:    Food Insecurity:     Worried About Running Out of Food in the Last Year:     Ran Out of Food in the Last Year:    Transportation Needs:     Lack of Transportation (Medical):      Lack of Transportation (Non-Medical):    Physical Activity:     Days of Exercise per Week:     Minutes of Exercise per Session:    Stress:     Feeling of Stress :    Social Connections:     Frequency of Communication with Friends and Family:     Frequency of Social Gatherings with Friends and Family:     Attends Protestant Services:     Active Member of Clubs or Organizations:     Attends Club or Organization Meetings:     Marital Status:    Intimate Partner Violence:     Fear of Current or Ex-Partner:     Emotionally Abused:     Physically Abused:     Sexually Abused: ALLERGIES: Patient has no known allergies. Review of Systems   Constitutional: Negative for fever. Respiratory: Negative for cough. Cardiovascular: Positive for chest pain. Vitals:    08/28/21 1933   BP: 121/60   Pulse: 80   Resp: 18   Temp: 98 °F (36.7 °C)   SpO2: 98%   Weight: 98.4 kg (217 lb)   Height: 6' (1.829 m)            Physical Exam  Vitals and nursing note reviewed. Constitutional:       Appearance: He is well-developed. HENT:      Head: Normocephalic and atraumatic. Eyes:      General: No scleral icterus. Cardiovascular:      Rate and Rhythm: Normal rate. Pulmonary:      Effort: Pulmonary effort is normal.   Chest:      Chest wall: Tenderness present. Abdominal:      General: There is no distension. Musculoskeletal:      Cervical back: Normal range of motion. Skin:     General: Skin is warm and dry. Findings: No erythema or rash. Neurological:      General: No focal deficit present. Mental Status: He is alert and oriented to person, place, and time. Psychiatric:         Mood and Affect: Mood normal.         Behavior: Behavior normal.          MDM       Procedures        The patient presented after a fall. The patient is now resting comfortably and feels better, is alert and in no distress. The patient has a normal mental status and is neurologically intact. The history, exam, diagnostic testing (if any), and current condition do not demonstrate signs of clinically significant intra-cranial, intra-thoracic, intra-abdominal, or musculoskeletal trauma. The vital signs have been stable. The patient's condition is stable and appropriate for discharge.  The patient will pursue further outpatient evaluation with the primary care physician or other designated or consulting physician as indicated in the discharge instructions.

## 2021-12-14 ENCOUNTER — HOSPITAL ENCOUNTER (INPATIENT)
Age: 31
LOS: 3 days | Discharge: HOME | DRG: 882 | End: 2021-12-17
Attending: EMERGENCY MEDICINE | Admitting: PSYCHIATRY & NEUROLOGY
Payer: MEDICARE

## 2021-12-14 DIAGNOSIS — Z00.8 MEDICAL CLEARANCE FOR PSYCHIATRIC ADMISSION: Primary | ICD-10-CM

## 2021-12-14 DIAGNOSIS — F43.24 ADJUSTMENT DISORDER WITH DISTURBANCE OF CONDUCT: ICD-10-CM

## 2021-12-14 DIAGNOSIS — F31.2 BIPOLAR AFFECTIVE DISORDER, CURRENTLY MANIC, SEVERE, WITH PSYCHOTIC FEATURES (HCC): ICD-10-CM

## 2021-12-14 PROBLEM — F31.9 BIPOLAR DISORDER (HCC): Status: ACTIVE | Noted: 2021-12-14

## 2021-12-14 LAB
ALBUMIN SERPL-MCNC: 4.2 G/DL (ref 3.5–5)
ALBUMIN/GLOB SERPL: 1.4 {RATIO} (ref 1.1–2.2)
ALP SERPL-CCNC: 85 U/L (ref 45–117)
ALT SERPL-CCNC: 21 U/L (ref 12–78)
AMPHET UR QL SCN: NEGATIVE
ANION GAP SERPL CALC-SCNC: 10 MMOL/L (ref 5–15)
AST SERPL-CCNC: 21 U/L (ref 15–37)
BARBITURATES UR QL SCN: NEGATIVE
BASOPHILS # BLD: 0.1 K/UL (ref 0–0.1)
BASOPHILS NFR BLD: 1 % (ref 0–1)
BENZODIAZ UR QL: NEGATIVE
BILIRUB SERPL-MCNC: 0.2 MG/DL (ref 0.2–1)
BUN SERPL-MCNC: 15 MG/DL (ref 6–20)
BUN/CREAT SERPL: 16 (ref 12–20)
CALCIUM SERPL-MCNC: 8.9 MG/DL (ref 8.5–10.1)
CANNABINOIDS UR QL SCN: POSITIVE
CHLORIDE SERPL-SCNC: 111 MMOL/L (ref 97–108)
CO2 SERPL-SCNC: 24 MMOL/L (ref 21–32)
COCAINE UR QL SCN: NEGATIVE
CREAT SERPL-MCNC: 0.95 MG/DL (ref 0.7–1.3)
DIFFERENTIAL METHOD BLD: ABNORMAL
DRUG SCRN COMMENT,DRGCM: ABNORMAL
EOSINOPHIL # BLD: 0 K/UL (ref 0–0.4)
EOSINOPHIL NFR BLD: 0 % (ref 0–7)
ERYTHROCYTE [DISTWIDTH] IN BLOOD BY AUTOMATED COUNT: 13.4 % (ref 11.5–14.5)
ETHANOL SERPL-MCNC: <10 MG/DL
FLUAV RNA SPEC QL NAA+PROBE: NOT DETECTED
FLUBV RNA SPEC QL NAA+PROBE: NOT DETECTED
GLOBULIN SER CALC-MCNC: 2.9 G/DL (ref 2–4)
GLUCOSE SERPL-MCNC: 99 MG/DL (ref 65–100)
HCT VFR BLD AUTO: 42.7 % (ref 36.6–50.3)
HGB BLD-MCNC: 13.5 G/DL (ref 12.1–17)
IMM GRANULOCYTES # BLD AUTO: 0.1 K/UL (ref 0–0.04)
IMM GRANULOCYTES NFR BLD AUTO: 1 % (ref 0–0.5)
LYMPHOCYTES # BLD: 1.8 K/UL (ref 0.8–3.5)
LYMPHOCYTES NFR BLD: 15 % (ref 12–49)
MCH RBC QN AUTO: 27.6 PG (ref 26–34)
MCHC RBC AUTO-ENTMCNC: 31.6 G/DL (ref 30–36.5)
MCV RBC AUTO: 87.3 FL (ref 80–99)
METHADONE UR QL: NEGATIVE
MONOCYTES # BLD: 0.9 K/UL (ref 0–1)
MONOCYTES NFR BLD: 7 % (ref 5–13)
NEUTS SEG # BLD: 9.3 K/UL (ref 1.8–8)
NEUTS SEG NFR BLD: 76 % (ref 32–75)
NRBC # BLD: 0 K/UL (ref 0–0.01)
NRBC BLD-RTO: 0 PER 100 WBC
OPIATES UR QL: NEGATIVE
PCP UR QL: NEGATIVE
PLATELET # BLD AUTO: 276 K/UL (ref 150–400)
PMV BLD AUTO: 9.6 FL (ref 8.9–12.9)
POTASSIUM SERPL-SCNC: 4.3 MMOL/L (ref 3.5–5.1)
PROT SERPL-MCNC: 7.1 G/DL (ref 6.4–8.2)
RBC # BLD AUTO: 4.89 M/UL (ref 4.1–5.7)
SARS-COV-2, COV2: NOT DETECTED
SODIUM SERPL-SCNC: 145 MMOL/L (ref 136–145)
WBC # BLD AUTO: 12.2 K/UL (ref 4.1–11.1)

## 2021-12-14 PROCEDURE — 87636 SARSCOV2 & INF A&B AMP PRB: CPT

## 2021-12-14 PROCEDURE — 80053 COMPREHEN METABOLIC PANEL: CPT

## 2021-12-14 PROCEDURE — 80201 ASSAY OF TOPIRAMATE: CPT

## 2021-12-14 PROCEDURE — 80307 DRUG TEST PRSMV CHEM ANLYZR: CPT

## 2021-12-14 PROCEDURE — 82077 ASSAY SPEC XCP UR&BREATH IA: CPT

## 2021-12-14 PROCEDURE — 36415 COLL VENOUS BLD VENIPUNCTURE: CPT

## 2021-12-14 PROCEDURE — 65220000003 HC RM SEMIPRIVATE PSYCH

## 2021-12-14 PROCEDURE — 85025 COMPLETE CBC W/AUTO DIFF WBC: CPT

## 2021-12-14 PROCEDURE — 99284 EMERGENCY DEPT VISIT MOD MDM: CPT

## 2021-12-14 RX ORDER — DIPHENHYDRAMINE HYDROCHLORIDE 50 MG/ML
50 INJECTION, SOLUTION INTRAMUSCULAR; INTRAVENOUS
Status: DISCONTINUED | OUTPATIENT
Start: 2021-12-14 | End: 2021-12-17 | Stop reason: HOSPADM

## 2021-12-14 RX ORDER — TRAZODONE HYDROCHLORIDE 50 MG/1
50 TABLET ORAL
Status: DISCONTINUED | OUTPATIENT
Start: 2021-12-14 | End: 2021-12-17 | Stop reason: HOSPADM

## 2021-12-14 RX ORDER — OLANZAPINE 5 MG/1
5 TABLET ORAL
Status: DISCONTINUED | OUTPATIENT
Start: 2021-12-14 | End: 2021-12-17 | Stop reason: HOSPADM

## 2021-12-14 RX ORDER — ADHESIVE BANDAGE
30 BANDAGE TOPICAL DAILY PRN
Status: DISCONTINUED | OUTPATIENT
Start: 2021-12-14 | End: 2021-12-17 | Stop reason: HOSPADM

## 2021-12-14 RX ORDER — HYDROXYZINE 25 MG/1
50 TABLET, FILM COATED ORAL
Status: DISCONTINUED | OUTPATIENT
Start: 2021-12-14 | End: 2021-12-17 | Stop reason: HOSPADM

## 2021-12-14 RX ORDER — LORAZEPAM 2 MG/ML
1 INJECTION INTRAMUSCULAR
Status: DISCONTINUED | OUTPATIENT
Start: 2021-12-14 | End: 2021-12-17 | Stop reason: HOSPADM

## 2021-12-14 RX ORDER — IBUPROFEN 200 MG
1 TABLET ORAL DAILY
Status: DISCONTINUED | OUTPATIENT
Start: 2021-12-15 | End: 2021-12-17 | Stop reason: HOSPADM

## 2021-12-14 RX ORDER — BENZTROPINE MESYLATE 1 MG/1
1 TABLET ORAL
Status: DISCONTINUED | OUTPATIENT
Start: 2021-12-14 | End: 2021-12-17 | Stop reason: HOSPADM

## 2021-12-14 RX ORDER — HALOPERIDOL 5 MG/ML
5 INJECTION INTRAMUSCULAR
Status: DISCONTINUED | OUTPATIENT
Start: 2021-12-14 | End: 2021-12-17 | Stop reason: HOSPADM

## 2021-12-14 RX ORDER — ACETAMINOPHEN 325 MG/1
650 TABLET ORAL
Status: DISCONTINUED | OUTPATIENT
Start: 2021-12-14 | End: 2021-12-17 | Stop reason: HOSPADM

## 2021-12-14 NOTE — ED TRIAGE NOTES
I accessed to audit this patients forensic restraints. Patient is in his room calm and cooperative [employer] is at the patients bedside.
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
## 2021-12-14 NOTE — ED NOTES
Pt is in forensic restraints in the front bilateral wrist; Skin intact; Forensic sign in place; [employer] at bedside; Security informed and nursing supervisor.  Pt is cooperative and compliant
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
## 2021-12-15 PROBLEM — F43.24 ADJUSTMENT DISORDER WITH DISTURBANCE OF CONDUCT: Status: ACTIVE | Noted: 2021-12-15

## 2021-12-15 PROBLEM — F31.9 BIPOLAR DISORDER (HCC): Status: RESOLVED | Noted: 2021-12-14 | Resolved: 2021-12-15

## 2021-12-15 LAB — TOPIRAMATE SERPL-MCNC: <1.5 UG/ML (ref 2–25)

## 2021-12-15 PROCEDURE — 65220000003 HC RM SEMIPRIVATE PSYCH

## 2021-12-15 PROCEDURE — 74011250637 HC RX REV CODE- 250/637: Performed by: PSYCHIATRY & NEUROLOGY

## 2021-12-15 PROCEDURE — 99223 1ST HOSP IP/OBS HIGH 75: CPT | Performed by: PSYCHIATRY & NEUROLOGY

## 2021-12-15 RX ORDER — TOPIRAMATE 25 MG/1
50 TABLET ORAL DAILY
COMMUNITY
End: 2021-12-17

## 2021-12-15 RX ORDER — TOPIRAMATE 25 MG/1
50 TABLET ORAL DAILY
Status: DISCONTINUED | OUTPATIENT
Start: 2021-12-15 | End: 2021-12-17 | Stop reason: HOSPADM

## 2021-12-15 RX ORDER — CLONIDINE HYDROCHLORIDE 0.1 MG/1
0.1 TABLET ORAL
Status: ON HOLD | COMMUNITY
End: 2022-02-23 | Stop reason: SDUPTHER

## 2021-12-15 RX ORDER — BUSPIRONE HYDROCHLORIDE 10 MG/1
30 TABLET ORAL 2 TIMES DAILY
Status: DISCONTINUED | OUTPATIENT
Start: 2021-12-15 | End: 2021-12-17 | Stop reason: HOSPADM

## 2021-12-15 RX ORDER — BUSPIRONE HYDROCHLORIDE 30 MG/1
30 TABLET ORAL 2 TIMES DAILY
Status: ON HOLD | COMMUNITY
End: 2021-12-17 | Stop reason: SDUPTHER

## 2021-12-15 RX ADMIN — BUSPIRONE HYDROCHLORIDE 30 MG: 10 TABLET ORAL at 17:25

## 2021-12-15 RX ADMIN — TOPIRAMATE 50 MG: 25 TABLET, FILM COATED ORAL at 13:47
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
## 2021-12-15 NOTE — PROGRESS NOTES
1045 Pt has remained sleeping at this time. Will assess upon waking up. 1800 Pt has remained isolative to their room this shift and is sleeping for the majority of the time. Pt does awake easily and is compliant with scheduled medications. Denies SI, HI, AH and VH.

## 2021-12-15 NOTE — PROGRESS NOTES
[facility] Admission Pharmacy Medication Reconciliation     Information obtained from: [address], [facility] [address] [facility] ([phone])  RxQuery data available1: Yes    Comments/recommendations:  Please interpret list below with caution as patient was not interviewed. Medication list updated based on recent fills at [facility]. All medications listed below were filled on 11/30/21. Medication changes (since last review): Added  Buspirone  Clonidine  Topiramate  Removed  Divalproex DR  Lidocaine 5% patch    The [place] Prescription Monitoring Program () was accessed to determine fill history of any controlled medications. No controlled medications filled within the past 6 months. 1RxQuery pharmacy benefit data reflects medications filled and processed through the patient's insurance, however                this data does NOT capture whether the medication was picked up or is currently being taken by the patient. Total Time Spent: 15 minutes    Past Medical History/Disease States:  Past Medical History:   Diagnosis Date    Bipolar 1 disorder ([facility] [address]     Psychiatric disorder     bipolar       Patient allergies: Allergies as of 12/14/2021    (No Known Allergies)       Prior to Admission Medications   Prescriptions Last Dose Informant Patient Reported? Taking?   busPIRone (BUSPAR) 30 mg tablet  Other Yes Yes   Sig: Take 30 mg by mouth two (2) times a day. cloNIDine HCL (CATAPRES) 0.1 mg tablet  Other Yes Yes   Sig: Take 0.1 mg by mouth three (3) times daily as needed. topiramate (TOPAMAX) 25 mg tablet  Other Yes Yes   Sig: Take 50 mg by mouth daily.       Facility-Administered Medications: None        Thank you,  ROSSY Hernandez [facility] Specialist, [facility]  Desk: [phone] ([id number])  Pharmacy: [phone] ([id number])

## 2021-12-15 NOTE — PROGRESS NOTES
Problem: Depressed Mood (Adult/Pediatric)  Goal: *STG: Verbalizes anger, guilt, and other feelings in a constructive manor  Outcome: Progressing Towards Goal  Goal: *STG: Remains safe in hospital  Outcome: Progressing Towards Goal

## 2021-12-15 NOTE — BH NOTES
GROUP THERAPY PROGRESS NOTE    Patient did not participate in Coping Skills group.      SENDY Curran

## 2021-12-15 NOTE — GROUP NOTE
DEVANTE  GROUP DOCUMENTATION INDIVIDUAL                                                                          Group Therapy Note    Date: 12/15/2021    Group Start Time: 1000  Group End Time: 1100  Group Topic: Topic Group    [address] [facility] GROUP    Group Therapy Note    Attendees: 9         Attendance: Did not attend    Patient's Goal:      Interventions/techniques  Latasha Lion

## 2021-12-15 NOTE — BH NOTES
GROUP THERAPY PROGRESS NOTE    Patient did not participate in Discharge Planning Group.      Hola Triana MSW

## 2021-12-15 NOTE — PROGRESS NOTES
[id number]  Received report from day shift nurse. Patient has been isolative to his room. Observed laying down in bed. Patient is guarded. Poor eye contact. One word responses to assessment questions. Patient states << I'm fine, I don't need anything.>> Patient denies SI/HI/AVH, anxiety and depression. No voiced concerns at this time. No PRNs requested or given. Will continue to monitor.      Report given to Jaimie Sims RN                  Problem: Depressed Mood (Adult/Pediatric)  Goal: *STG: Remains safe in hospital  Outcome: Progressing Towards Goal

## 2021-12-15 NOTE — GROUP NOTE
DEVANTE  GROUP DOCUMENTATION INDIVIDUAL                                                                          Group Therapy Note    Date: 12/15/2021    Group Start Time: 1400  Group End Time: 1500  Group Topic: Recreational/Music Therapy    [facility] [address] ACUTE BEHAV TH    [facility] GROUP DOCUMENTATION GROUP    Group Therapy Note    Attendees: 7         Attendance: Did not attend    Patient's Goal:      Interventions/techniquesGabriela Cornejo

## 2021-12-15 NOTE — BH NOTES
GROUP THERAPY PROGRESS NOTE    Patient did not participate in Coping Skills group.      Sherie Buerger, MSW

## 2021-12-15 NOTE — PROGRESS NOTES
[id number]: Report received from [address] [place] [address] [address]. Pt visible sleeping in his bed. No concerns present. [id number]: Pt has been observed throughout the night. Total hours slept approximately 8. No s/s of distress noted. Patient has remained safe. The documentation for this period is being entered following the guidelines as defined in the [address] downtime policy by Jessica Martin RN.

## 2021-12-16 VITALS
DIASTOLIC BLOOD PRESSURE: 80 MMHG | RESPIRATION RATE: 16 BRPM | HEIGHT: 71 IN | BODY MASS INDEX: 27.13 KG/M2 | TEMPERATURE: 98.6 F | WEIGHT: 193.8 LBS | OXYGEN SATURATION: 100 % | SYSTOLIC BLOOD PRESSURE: 132 MMHG | HEART RATE: 94 BPM

## 2021-12-16 PROCEDURE — 99232 SBSQ HOSP IP/OBS MODERATE 35: CPT | Performed by: PSYCHIATRY & NEUROLOGY

## 2021-12-16 PROCEDURE — 74011250637 HC RX REV CODE- 250/637: Performed by: NURSE PRACTITIONER

## 2021-12-16 PROCEDURE — 65220000003 HC RM SEMIPRIVATE PSYCH

## 2021-12-16 PROCEDURE — 74011250637 HC RX REV CODE- 250/637: Performed by: PSYCHIATRY & NEUROLOGY

## 2021-12-16 RX ADMIN — HYDROXYZINE HYDROCHLORIDE 50 MG: 25 TABLET, FILM COATED ORAL at 20:31

## 2021-12-16 RX ADMIN — BUSPIRONE HYDROCHLORIDE 30 MG: 10 TABLET ORAL at 17:44

## 2021-12-16 RX ADMIN — TRAZODONE HYDROCHLORIDE 50 MG: 50 TABLET ORAL at 20:31

## 2021-12-16 RX ADMIN — BUSPIRONE HYDROCHLORIDE 30 MG: 10 TABLET ORAL at 09:13

## 2021-12-16 RX ADMIN — ACETAMINOPHEN 650 MG: 325 TABLET ORAL at 16:27

## 2021-12-16 RX ADMIN — TOPIRAMATE 50 MG: 25 TABLET, FILM COATED ORAL at 09:12

## 2021-12-16 NOTE — PROGRESS NOTES
Problem: Depressed Mood (Adult/Pediatric)  Goal: *STG: Participates in treatment plan  Outcome: Not Progressing Towards Goal  Goal: *STG: Verbalizes anger, guilt, and other feelings in a constructive manor  Outcome: Not Progressing Towards Goal  Goal: *STG: Remains safe in hospital  Outcome: Progressing Towards Goal

## 2021-12-16 NOTE — GROUP NOTE
DEVANTE  GROUP DOCUMENTATION INDIVIDUAL                                                                          Group Therapy Note    Date: 12/16/2021    Group Start Time: 1400  Group End Time: 1500  Group Topic: Recreational/Music Therapy    [facility] [place] [address] ACUTE BEHAV [facility]    [place], [address] [facility] GROUP DOCUMENTATION GROUP    Group Therapy Note    Attendees: 11         Attendance: Attended    Patient's Goal:  To concentrate on selected task    Interventions/techniques: Supported-crafts,games,music    Follows Directions:  Followed directions    Interactions: Interacted appropriately    Mental Status: Calm    Behavior/appearance: Cooperative and Needed prompting    Goals Achieved: Able to engage in interactions and Able to listen to others-listened to [facility]

## 2021-12-16 NOTE — PROGRESS NOTES
Napoleon Cruz remained in his room the entire evening. He was guarded during his assessment. He denied SI, HI, AVH and pain. He denies anxiety and depression. He stated he is fine and doesn't need to be here. He appears to be sleeping well.

## 2021-12-16 NOTE — GROUP NOTE
DEVANTE  GROUP DOCUMENTATION INDIVIDUAL                                                                          Group Therapy Note    Date: 12/16/2021    Group Start Time: 1000  Group End Time: 1100  Group Topic: Topic Group    [facility] [place] [address] ACUTE BEHAV [facility]    [place], [address] [facility] GROUP DOCUMENTATION GROUP    Group Therapy Note    Attendees: 6         Attendance: Attended    Patient's Goal:  To participate in relaxation activity    Interventions/techniques: Supported-art    Interactions: Minimal    Mental Status: Calm and Flat    Behavior/appearance: Needed prompting    Goals Achieved:        Additional Notes:  Arrived late-declined active participation-sat off to himself    Reather Seal

## 2021-12-16 NOTE — PROGRESS NOTES
Pt is pleasant and cooperative while interacting. Pt is awoken easily for medications and has attended groups today. Pt is compliant with scheduled medications. Pt has talked about wanting to be discharged by tomorrow and states his family was willing to take him back. Pt denies SI, HI, AH and VH.

## 2021-12-16 NOTE — PROGRESS NOTES
Problem: Depressed Mood (Adult/Pediatric)  Goal: *STG: Participates in treatment plan  Outcome: Progressing Towards Goal  Goal: *STG: Remains safe in hospital  Outcome: Progressing Towards Goal  Goal: *STG: Complies with medication therapy  Outcome: Progressing Towards Goal     Problem: Depressed Mood (Adult/Pediatric)  Goal: *STG: Attends activities and groups  Outcome: Not Progressing Towards Goal

## 2021-12-16 NOTE — PROGRESS NOTES
Behavioral Services  Medicare Certification Upon Admission    I certify that this patient's inpatient psychiatric hospital admission is medically necessary for:    [x] (1) Treatment which could reasonably be expected to improve this patient's condition,       [x] (2) Or for diagnostic study;     AND     [x](2) The inpatient psychiatric services are provided while the individual is under the care of a physician and are included in the individualized plan of care.     Estimated length of stay/service 3-5 days    Plan for post-hospital care home vs CSU    Electronically signed by Dom Borges MD on 12/15/2021 at 8:57 PM

## 2021-12-16 NOTE — BH NOTES
Behavioral Health Treatment Team Note         Patient goal(s) for today: continue taking medication as prescribed, engage in unit activities, participate in hygiene/ADLs, follow directions from staff, contact support team, prepare for discharge  Treatment team focus/goals: medication adjustments, dispo planning, update support system    Progress note: Pt seen from bed and is requesting discharge on 12/17. Pt aware that crisis will have to evaluate.      LOS:  2  Expected LOS: TBD    Financial concerns/prescription coverage: [address]  Medicare Part A&B with [employer] Extended Coverage and [address] Medicaid   Date of last family contact: none     Family requesting physician contact today:  N/A  Discharge plan: TBD  Guns in the home: None reported       Outpatient provider(s): [facility]    Participating treatment team members: Shae Mata, MSW and Dr. Lorenzo Clay

## 2021-12-16 NOTE — BH NOTES
PSYCHIATRIC PROGRESS NOTE         Patient Name  Meryl Naranjo   Date of Birth 1990   CSN [id number]   Medical Record Number  [id number]      Age  27 y.o. PCP None   Admit date:  12/14/2021    Room Number  316/01  @ [facility]   Date of Service  12/16/2021         E & M PROGRESS NOTE:         HISTORY       CC:  \"homicidal ideation\"  HISTORY OF PRESENT ILLNESS/INTERVAL HISTORY:  (reviewed/updated 12/16/2021). per initial evaluation: The patient, Meryl Naranjo, is a 27 y.o. WHITE/NON- male with a past psychiatric history significant for bipolar by chart review, who presents at this time with complaints of (and/or evidence of) the following emotional symptoms: suicidal thoughts/threats and agitation. Additional symptomatology include homicidal ideation. The above symptoms have been present for 2+ weeks. These symptoms are of moderate to high severity. These symptoms are constant in nature. The patient's condition has been precipitated by psychosocial stressors. UDS: negative; BAL=0.      Per admission documentation, the patient was admitted due to making suicidal statements as well as homicidal threats to his family with whom he lives.     The patient is an unreliable historian. The patient corroborates the above narrative. The patient contracts for safety on the unit but does not give consent for the team to contact collateral. The patient is amenable to initiating treatment while on the unit. 12/16 - no acute overnight events. Patient remains isolative but is cooperative with assessments and treatment. He is discharge focused and states he had no intentions of acting on any of the provocative statements he made to family prior to admission. Per , his family is pursuing protective orders against him as he stated explicitly that he planned to kill his family and then himself. He denies SI/HI/AVH/PI.          SIDE EFFECTS: (reviewed/updated 12/16/2021)  None reported or admitted to. ALLERGIES:(reviewed/updated 12/16/2021)  No Known Allergies   MEDICATIONS PRIOR TO ADMISSION:(reviewed/updated 12/16/2021)  Medications Prior to Admission   Medication Sig    busPIRone (BUSPAR) 30 mg tablet Take 30 mg by mouth two (2) times a day.  cloNIDine HCL (CATAPRES) 0.1 mg tablet Take 0.1 mg by mouth three (3) times daily as needed.  topiramate (TOPAMAX) 25 mg tablet Take 50 mg by mouth daily. PAST MEDICAL HISTORY: Past medical history from the initial psychiatric evaluation has been reviewed (reviewed/updated 12/16/2021) with no additional updates (I asked patient and no additional past medical history provided). Past Medical History:   Diagnosis Date    Bipolar 1 disorder ([facility] [address]     Psychiatric disorder     bipolar   History reviewed. No pertinent surgical history. SOCIAL HISTORY: Social history from the initial psychiatric evaluation has been reviewed (reviewed/updated 12/16/2021) with no additional updates (I asked patient and no additional social history provided).  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    Drug use: Yes     Types: Marijuana, Heroin, Cocaine     Comment: pt states last use 1-1.5 yrs    Sexual activity: Not Currently   Other Topics Concern    Not on file   Social History Narrative    ** Merged History Encounter **          Social Determinants of Health     Financial Resource Strain:     Difficulty of Paying Living Expenses: Not on file   Food Insecurity:     Worried About Running Out of Food in the Last Year: Not on file    Ran Out of Food in the Last Year: Not on file   Transportation Needs:     Lack of Transportation (Medical): Not on file    Lack of Transportation (Non-Medical): Not on file   Physical Activity:     Days of Exercise per Week: Not on file    Minutes of Exercise per Session: Not on file   Stress:     Feeling of Stress : Not on file   Social Connections:     Frequency of Communication with Friends and Family: Not on file    Frequency of Social Gatherings with Friends and Family: Not on file    Attends Congregation Services: Not on file    Active Member of [address] [place] [address] [email or web address] or Organizations: Not on file    Attends Club or Organization Meetings: Not on file    Marital Status: Not on file   Intimate Partner Violence:     Fear of Current or Ex-Partner: Not on file    Emotionally Abused: Not on file    Physically Abused: Not on file    Sexually Abused: Not on file   Housing Stability:     Unable to Pay for Housing in the Last Year: Not on file    Number of [place] in the Last Year: Not on file    Unstable Housing in the Last Year: Not on file      FAMILY HISTORY: Family history from the initial psychiatric evaluation has been reviewed (reviewed/updated 12/16/2021) with no additional updates (I asked patient and no additional family history provided). History reviewed. No pertinent family history. REVIEW OF SYSTEMS: (reviewed/updated 12/16/2021)  Appetite:no change from normal   Sleep: fitful   All other Review of Systems: Negative except per HPI         [address] (MSE):    MSE FINDINGS ARE WITHIN NORMAL LIMITS (WNL) UNLESS OTHERWISE STATED BELOW. ( ALL OF THE BELOW CATEGORIES OF THE MSE HAVE BEEN REVIEWED (reviewed 12/16/2021) AND UPDATED AS DEEMED APPROPRIATE )  General Presentation age appropriate, evasive and guarded   Orientation oriented to time, place and person   Vital Signs  See below (reviewed 12/16/2021);  Vital Signs (BP, Pulse, & Temp) are within normal limits if not listed below. Gait and Station Stable/steady, no ataxia   Musculoskeletal System No extrapyramidal symptoms (EPS); no abnormal muscular movements or Tardive Dyskinesia (TD); muscle strength and tone are within normal limits   Language No aphasia or dysarthria   Speech:  monotone, non-pressured and profane   Thought Processes logical; normal rate of thoughts; poor abstract reasoning/computation   Thought Associations blocked    Thought Content preoccupations and internally preoccupied   Suicidal Ideations contracts for safety   Homicidal Ideations contracts for safety   Mood:  anxious  and depressed   Affect:  constricted and mood-congruent   Memory recent  intact   Memory remote:  intact   Concentration/Attention:  intact   Fund of Knowledge average   Insight:  poor   Reliability poor   Judgment:  poor          VITALS:     Patient Vitals for the past 24 hrs:   Temp Pulse Resp BP SpO2   12/16/21 0805 97.6 °F (36.4 °C) 88 17 117/77 100 %   12/15/21 2019 98.6 °F (37 °C) 68 16 (!) 102/56 100 %   12/15/21 1817 98.2 °F (36.8 °C) (!) 59 16 122/69 100 %     Wt Readings from Last 3 Encounters:   12/14/21 87.9 kg (193 lb 12.8 oz)   08/28/21 98.4 kg (217 lb)   06/01/19 122.5 kg (270 lb)     Temp Readings from Last 3 Encounters:   12/16/21 97.6 °F (36.4 °C)   08/28/21 98 °F (36.7 °C)   06/02/19 97.5 °F (36.4 °C)     BP Readings from Last 3 Encounters:   12/16/21 117/77   08/28/21 121/60   06/02/19 (!) 144/95     Pulse Readings from Last 3 Encounters:   12/16/21 88   08/28/21 80   06/02/19 85            DATA     LABORATORY DATA:(reviewed/updated 12/16/2021)  No results found for this or any previous visit (from the past 24 hour(s)). No results found for: VALF2, VALAC, VALP, VALPR, DS6, CRBAM, CRBAMP, CARB2, XCRBAM  No results found for: LITHM   RADIOLOGY REPORTS:(reviewed/updated 12/16/2021)  No results found.        MEDICATIONS     ALL MEDICATIONS:   Current Facility-Administered Medications   Medication Dose Route Frequency    busPIRone (BUSPAR) tablet 30 mg  30 mg Oral BID    topiramate (TOPAMAX) tablet 50 mg  50 mg Oral DAILY    OLANZapine (ZyPREXA) tablet 5 mg  5 mg Oral Q6H PRN    haloperidol lactate (HALDOL) injection 5 mg  5 mg IntraMUSCular Q6H PRN    benztropine (COGENTIN) tablet 1 mg  1 mg Oral BID PRN    diphenhydrAMINE (BENADRYL) injection 50 mg  50 mg IntraMUSCular BID PRN    hydrOXYzine HCL (ATARAX) tablet 50 mg  50 mg Oral TID PRN    LORazepam (ATIVAN) injection 1 mg  1 mg IntraMUSCular Q4H PRN    traZODone (DESYREL) tablet 50 mg  50 mg Oral QHS PRN    acetaminophen (TYLENOL) tablet 650 mg  650 mg Oral Q4H PRN    magnesium hydroxide (MILK OF MAGNESIA) 400 mg/5 mL oral suspension 30 mL  30 mL Oral DAILY PRN    nicotine (NICODERM CQ) 21 mg/24 hr patch 1 Patch  1 Patch TransDERmal DAILY      SCHEDULED MEDICATIONS:   Current Facility-Administered Medications   Medication Dose Route Frequency    busPIRone (BUSPAR) tablet 30 mg  30 mg Oral BID    topiramate (TOPAMAX) tablet 50 mg  50 mg Oral DAILY    nicotine (NICODERM CQ) 21 mg/24 hr patch 1 Patch  1 Patch TransDERmal DAILY          ASSESSMENT & PLAN     DIAGNOSES REQUIRING ACTIVE TREATMENT AND MONITORING: (reviewed/updated 12/16/2021)  Patient Active Hospital Problem List:    Adjustment disorder with disturbance of conduct (12/15/2021)    Assessment: patient with an episode of mood lability in the setting of family stressors. He has endorsed a plan to kill his family and them himself per collateral information. Will observe on the unit, restart home regimen and act on duty to warn if patient is able to discharge prior to safety planning.     Plan:  - CONTINUE Buspar 30 mg BID for anxiety  - CONTINUE Topamax 50 mg QDAY for anxiety  - Consider mood stabilizer  - IGM therapy as tolerated  - Expand database / obtain collateral  - Dispo planning (home when stable)       In summary, Liu Aburto, is a 27 y.o.  male who presents with a severe exacerbation of the principal diagnosis of Adjustment disorder with disturbance of conduct    Patient's condition is not improving. Patient requires continued inpatient hospitalization for further stabilization, safety monitoring and medication management. I will continue to coordinate the provision of individual, milieu, occupational, group, and substance abuse therapies to address target symptoms/diagnoses as deemed appropriate for the individual patient. A coordinated, multidisplinary treatment team round was conducted with the patient (this team consists of the nurse, psychiatric unit pharmacist,  and writer). Complete current electronic health record for patient has been reviewed today including consultant notes, ancillary staff notes, nurses and psychiatric tech notes. Suicide risk assessment completed and patient deemed to be of moderate risk for suicide at this time. The following regarding medications was addressed during rounds with patient:   the risks and benefits of the proposed medication. The patient was given the opportunity to ask questions. Informed consent given to the use of the above medications. Will continue to adjust psychiatric and non-psychiatric medications (see above \"medication\" section and orders section for details) as deemed appropriate & based upon diagnoses and response to treatment. I will continue to order blood tests/labs and diagnostic tests as deemed appropriate and review results as they become available (see orders for details and above listed lab/test results). I will order psychiatric records from previous [facility] hospitals to further elucidate the nature of patient's psychopathology and review once available. I will gather additional collateral information from friends, family and o/p treatment team to further elucidate the nature of patient's psychopathology and baselline level of psychiatric functioning. I certify that this patient's inpatient psychiatric hospital services furnished since the previous certification were, and continue to be, required for treatment that could reasonably be expected to improve the patient's condition, or for diagnostic study, and that the patient continues to need, on a daily basis, active treatment furnished directly by or requiring the supervision of inpatient psychiatric facility personnel. In addition, the hospital records show that services furnished were intensive treatment services, admission or related services, or equivalent services.     EXPECTED DISCHARGE DATE/DAY: TBD     DISPOSITION: Home       Signed By:   Barry Valerio MD  12/16/2021

## 2021-12-16 NOTE — H&P
INITIAL PSYCHIATRIC EVALUATION            IDENTIFICATION:    Patient Name  Alton Durán   Date of Birth 1990   [facility] [id number]   Medical Record Number  [id number]      Age  27 y.o. PCP None   Admit date:  12/14/2021    Room Number  316/01  @ [facility]   Date of Service  12/15/2021            HISTORY         REASON FOR HOSPITALIZATION:  CC: \"homicidal ideation\". Pt admitted under a voluntary basis for suicidal ideations proving to be an imminent danger to self and others and an inability to care for self. HISTORY OF PRESENT ILLNESS:    The patient, Alton Durán, is a 27 y.o. WHITE/NON- male with a past psychiatric history significant for bipolar by chart review, who presents at this time with complaints of (and/or evidence of) the following emotional symptoms: suicidal thoughts/threats and agitation. Additional symptomatology include homicidal ideation. The above symptoms have been present for 2+ weeks. These symptoms are of moderate to high severity. These symptoms are constant in nature. The patient's condition has been precipitated by psychosocial stressors. UDS: negative; BAL=0. Per admission documentation, the patient was admitted due to making suicidal statements as well as homicidal threats to his family with whom he lives. The patient is an unreliable historian. The patient corroborates the above narrative. The patient contracts for safety on the unit but does not give consent for the team to contact collateral. The patient is amenable to initiating treatment while on the unit. ALLERGIES: No Known Allergies   MEDICATIONS PRIOR TO ADMISSION:   Medications Prior to Admission   Medication Sig    busPIRone (BUSPAR) 30 mg tablet Take 30 mg by mouth two (2) times a day.  cloNIDine HCL (CATAPRES) 0.1 mg tablet Take 0.1 mg by mouth three (3) times daily as needed.  topiramate (TOPAMAX) 25 mg tablet Take 50 mg by mouth daily.       PAST MEDICAL HISTORY: Past Medical History:   Diagnosis Date    Bipolar 1 disorder ([facility] [address]     Psychiatric disorder     bipolar   History reviewed. No pertinent surgical history. SOCIAL HISTORY:  The patient is currently employed; the patient is a smoker, and smokes up to 1 ppd; the patient's marital status is single; the patient does not have children; the patient reports the highest level of education achieved is high school. He lives with his parents. FAMILY HISTORY: History reviewed, pertinent family history as below:   History reviewed. No pertinent family history. REVIEW OF SYSTEMS:   Pertinent items are noted in the History of Present Illness. All other Systems reviewed and are considered negative. MENTAL STATUS EXAM & VITALS     MENTAL STATUS EXAM (MSE):    MSE FINDINGS ARE WITHIN NORMAL LIMITS (WNL) UNLESS OTHERWISE STATED BELOW. ( ALL OF THE BELOW CATEGORIES OF THE MSE HAVE BEEN REVIEWED (reviewed 12/15/2021) AND UPDATED AS DEEMED APPROPRIATE )  General Presentation age appropriate and disheveled, guarded and unreliable   Orientation oriented to time, place and person   Vital Signs  See below (reviewed 12/15/2021); Vital Signs (BP, Pulse, & Temp) are within normal limits if not listed below.    Gait and Station Stable/steady, no ataxia   Musculoskeletal System No extrapyramidal symptoms (EPS); no abnormal muscular movements or Tardive Dyskinesia (TD); muscle strength and tone are within normal limits   Language No aphasia or dysarthria   Speech:  normal volume and non-pressured   Thought Processes illogical; normal rate of thoughts; fair abstract reasoning/computation   Thought Associations blocked    Thought Content internally preoccupied and poverty of content   Suicidal Ideations contracts for safety   Homicidal Ideations none   Mood:  anhedonia   Affect:  blunted and mood-congruent   Memory recent  intact   Memory remote:  intact   Concentration/Attention:  intact   Fund of Knowledge average   Insight: poor   Reliability poor   Judgment:  poor          VITALS:     Patient Vitals for the past 24 hrs:   Temp Pulse Resp BP SpO2   12/15/21 2019 98.6 °F (37 °C) 68 16 (!) 102/56 100 %   12/15/21 1817 98.2 °F (36.8 °C) (!) 59 16 122/69 100 %     Wt Readings from Last 3 Encounters:   12/14/21 87.9 kg (193 lb 12.8 oz)   08/28/21 98.4 kg (217 lb)   06/01/19 122.5 kg (270 lb)     Temp Readings from Last 3 Encounters:   12/15/21 98.6 °F (37 °C)   08/28/21 98 °F (36.7 °C)   06/02/19 97.5 °F (36.4 °C)     BP Readings from Last 3 Encounters:   12/15/21 (!) 102/56   08/28/21 121/60   06/02/19 (!) 144/95     Pulse Readings from Last 3 Encounters:   12/15/21 68   08/28/21 80   06/02/19 85            DATA     LABORATORY DATA:  Labs Reviewed   CBC WITH AUTOMATED DIFF - Abnormal; Notable for the following components:       Result Value    WBC 12.2 (*)     NEUTROPHILS 76 (*)     IMMATURE GRANULOCYTES 1 (*)     ABS. NEUTROPHILS 9.3 (*)     ABS. IMM. GRANS. 0.1 (*)     All other components within normal limits   METABOLIC PANEL, COMPREHENSIVE - Abnormal; Notable for the following components:    Chloride 111 (*)     All other components within normal limits   DRUG SCREEN, URINE - Abnormal; Notable for the following components:    THC (TH-CANNABINOL) Positive (*)     All other components within normal limits   TOPIRAMATE - Abnormal; Notable for the following components:    Topiramate <1.5 (*)     All other components within normal limits   COVID-19 WITH INFLUENZA A/B   ETHYL ALCOHOL     Admission on 12/14/2021   Component Date Value Ref Range Status    ALCOHOL(ETHYL),SERUM 12/14/2021 <10  <10 MG/DL Final    WBC 12/14/2021 12.2* 4.1 - 11.1 K/uL Final    RBC 12/14/2021 4.89  4. 10 - 5.70 M/uL Final    HGB 12/14/2021 13.5  12.1 - 17.0 g/dL Final    HCT 12/14/2021 42.7  36.6 - 50.3 % Final    MCV 12/14/2021 87.3  80.0 - 99.0 FL Final    MCH 12/14/2021 27.6  26.0 - 34.0 PG Final    MCHC 12/14/2021 31.6  30.0 - 36.5 g/dL Final    RDW 12/14/2021 13.4  11.5 - 14.5 % Final    PLATELET 05/10/8909 136  150 - 400 K/uL Final    MPV 12/14/2021 9.6  8.9 - 12.9 FL Final    NRBC 12/14/2021 0.0  0  WBC Final    ABSOLUTE NRBC 12/14/2021 0.00  0.00 - 0.01 K/uL Final    NEUTROPHILS 12/14/2021 76* 32 - 75 % Final    LYMPHOCYTES 12/14/2021 15  12 - 49 % Final    MONOCYTES 12/14/2021 7  5 - 13 % Final    EOSINOPHILS 12/14/2021 0  0 - 7 % Final    BASOPHILS 12/14/2021 1  0 - 1 % Final    IMMATURE GRANULOCYTES 12/14/2021 1* 0.0 - 0.5 % Final    ABS. NEUTROPHILS 12/14/2021 9.3* 1.8 - 8.0 K/UL Final    ABS. LYMPHOCYTES 12/14/2021 1.8  0.8 - 3.5 K/UL Final    ABS. MONOCYTES 12/14/2021 0.9  0.0 - 1.0 K/UL Final    ABS. EOSINOPHILS 12/14/2021 0.0  0.0 - 0.4 K/UL Final    ABS. BASOPHILS 12/14/2021 0.1  0.0 - 0.1 K/UL Final    ABS. IMM. GRANS. 12/14/2021 0.1* 0.00 - 0.04 K/UL Final    DF 12/14/2021 AUTOMATED    Final    Sodium 12/14/2021 145  136 - 145 mmol/L Final    Potassium 12/14/2021 4.3  3.5 - 5.1 mmol/L Final    Chloride 12/14/2021 111* 97 - 108 mmol/L Final    CO2 12/14/2021 24  21 - 32 mmol/L Final    Anion gap 12/14/2021 10  5 - 15 mmol/L Final    Glucose 12/14/2021 99  65 - 100 mg/dL Final    BUN 12/14/2021 15  6 - 20 MG/DL Final    Creatinine 12/14/2021 0.95  0.70 - 1.30 MG/DL Final    BUN/Creatinine ratio 12/14/2021 16  12 - 20   Final    GFR est AA 12/14/2021 >60  >60 ml/min/1.73m2 Final    GFR est non-AA 12/14/2021 >60  >60 ml/min/1.73m2 Final    Calcium 12/14/2021 8.9  8.5 - 10.1 MG/DL Final    Bilirubin, total 12/14/2021 0.2  0.2 - 1.0 MG/DL Final    ALT (SGPT) 12/14/2021 21  12 - 78 U/L Final    AST (SGOT) 12/14/2021 21  15 - 37 U/L Final    Alk.  phosphatase 12/14/2021 85  45 - 117 U/L Final    Protein, total 12/14/2021 7.1  6.4 - 8.2 g/dL Final    Albumin 12/14/2021 4.2  3.5 - 5.0 g/dL Final    Globulin 12/14/2021 2.9  2.0 - 4.0 g/dL Final    A-G Ratio 12/14/2021 1.4  1.1 - 2.2   Final    AMPHETAMINES 12/14/2021 Negative  NEG   Final    BARBITURATES 12/14/2021 Negative  NEG   Final    BENZODIAZEPINES 12/14/2021 Negative  NEG   Final    COCAINE 12/14/2021 Negative  NEG   Final    METHADONE 12/14/2021 Negative  NEG   Final    OPIATES 12/14/2021 Negative  NEG   Final    PCP(PHENCYCLIDINE) 12/14/2021 Negative  NEG   Final    THC (TH-CANNABINOL) 12/14/2021 Positive* NEG   Final    Drug screen comment 12/14/2021 (NOTE)   Final    Topiramate 12/14/2021 <1.5* 2.0 - 25.0 ug/mL Final    SARS-CoV-2 12/14/2021 Not detected  NOTD   Final    Influenza A by PCR 12/14/2021 Not detected    Final    Influenza B by PCR 12/14/2021 Not detected    Final        RADIOLOGY REPORTS:  Results from Hospital Encounter encounter on 08/28/21    XR CHEST PA LAT    Narrative  Exam:  2 view chest    Indication: Status post fall with chest pain    Comparison to 3/19/2019. PA and lateral views demonstrate normal heart size. There is no acute process in  the lung fields. The osseous structures are unremarkable. Impression  No acute process or change compared to the prior exam.  No results found.            MEDICATIONS       ALL MEDICATIONS  Current Facility-Administered Medications   Medication Dose Route Frequency    busPIRone (BUSPAR) tablet 30 mg  30 mg Oral BID    topiramate (TOPAMAX) tablet 50 mg  50 mg Oral DAILY    OLANZapine (ZyPREXA) tablet 5 mg  5 mg Oral Q6H PRN    haloperidol lactate (HALDOL) injection 5 mg  5 mg IntraMUSCular Q6H PRN    benztropine (COGENTIN) tablet 1 mg  1 mg Oral BID PRN    diphenhydrAMINE (BENADRYL) injection 50 mg  50 mg IntraMUSCular BID PRN    hydrOXYzine HCL (ATARAX) tablet 50 mg  50 mg Oral TID PRN    LORazepam (ATIVAN) injection 1 mg  1 mg IntraMUSCular Q4H PRN    traZODone (DESYREL) tablet 50 mg  50 mg Oral QHS PRN    acetaminophen (TYLENOL) tablet 650 mg  650 mg Oral Q4H PRN    magnesium hydroxide (MILK OF MAGNESIA) 400 mg/5 mL oral suspension 30 mL  30 mL Oral DAILY PRN    nicotine (NICODERM CQ) 21 mg/24 hr patch 1 Patch  1 Patch TransDERmal DAILY      SCHEDULED MEDICATIONS  Current Facility-Administered Medications   Medication Dose Route Frequency    busPIRone (BUSPAR) tablet 30 mg  30 mg Oral BID    topiramate (TOPAMAX) tablet 50 mg  50 mg Oral DAILY    nicotine (NICODERM CQ) 21 mg/24 hr patch 1 Patch  1 Patch TransDERmal DAILY                ASSESSMENT & PLAN        The patient, Augusto Barrera, is a 27 y.o.  male who presents at this time for treatment of the following diagnoses:  Patient Active Hospital Problem List:   Adjustment disorder with disturbance of conduct (12/15/2021)    Assessment: patient with an episode of mood lability in the setting of family stressors. He has endorsed a plan to kill his family and them himself per collateral information. Will observe on the unit, restart home regimen and act on duty to warn if patient is able to discharge prior to safety planning. Plan:  - RESTART Buspar 30 mg BID for anxiety  - RESTART Topamax 50 mg QDAY for anxiety  - Consider mood stabilizer  - HOLD Clonidine pending clarifications of indication  - IGM therapy as tolerated  - Expand database / obtain collateral  - Dispo planning (home when stable)           A coordinated, multidisplinary treatment team (includes the nurse, unit pharmacist,  and writer) round was conducted for this initial evaluation with the patient present. The following regarding medications was addressed during rounds with patient: the risks and benefits of the proposed medication. The patient was given the opportunity to ask questions. Informed consent given to the use of the above medications. I will continue to adjust psychiatric and non-psychiatric medications (see above \"medication\" section and orders section for details) as deemed appropriate & based upon diagnoses and response to treatment.  I have reviewed admission (and previous/old) labs and medical tests in the EHR and or transferring hospital documents. I will continue to order blood tests/labs and diagnostic tests as deemed appropriate and review results as they become available (see orders for details). I have reviewed old psychiatric and medical records available in the EHR. I Will order additional psychiatric records from other institutions to further elucidate the nature of patient's psychopathology and review once available. I will gather additional collateral information from friends, family and o/p treatment team to further elucidate the nature of patient's psychopathology and baselline level of psychiatric functioning. I certify that this patient's inpatient psychiatric hospital services are required for treatment that could reasonably be expected to improve the patient's condition, or for diagnostic study, and that the patient continues to need, on a daily basis, active treatment furnished directly by or requiring the supervision of inpatient psychiatric facility personnel. In addition, the hospital records show that services furnished were intensive treatment services, admission or related services, or equivalent services.       ESTIMATED LENGTH OF STAY:  3-5 days       STRENGTHS:  Exercising self-direction/Resourceful, Access to housing/residential stability and Interpersonal/supportive relationships (family, friends, peers)                                        SIGNED:    Eric Chakraborty MD  12/15/2021

## 2021-12-17 PROCEDURE — 99239 HOSP IP/OBS DSCHRG MGMT >30: CPT | Performed by: PSYCHIATRY & NEUROLOGY

## 2021-12-17 PROCEDURE — 74011250637 HC RX REV CODE- 250/637: Performed by: PSYCHIATRY & NEUROLOGY

## 2021-12-17 RX ORDER — TRAZODONE HYDROCHLORIDE 50 MG/1
50 TABLET ORAL
Qty: 30 TABLET | Refills: 1 | Status: ON HOLD | OUTPATIENT
Start: 2021-12-17 | End: 2022-02-23 | Stop reason: SDUPTHER

## 2021-12-17 RX ORDER — HYDROXYZINE 50 MG/1
50 TABLET, FILM COATED ORAL
Qty: 60 TABLET | Refills: 1 | Status: SHIPPED | OUTPATIENT
Start: 2021-12-17 | End: 2022-02-15

## 2021-12-17 RX ORDER — BUSPIRONE HYDROCHLORIDE 30 MG/1
30 TABLET ORAL 2 TIMES DAILY
Qty: 60 TABLET | Refills: 1 | Status: ON HOLD | OUTPATIENT
Start: 2021-12-17 | End: 2022-02-23 | Stop reason: SDUPTHER

## 2021-12-17 RX ORDER — TOPIRAMATE 50 MG/1
50 TABLET, FILM COATED ORAL DAILY
Qty: 30 TABLET | Refills: 1 | Status: SHIPPED | OUTPATIENT
Start: 2021-12-18 | End: 2022-02-24

## 2021-12-17 RX ADMIN — TOPIRAMATE 50 MG: 25 TABLET, FILM COATED ORAL at 08:12

## 2021-12-17 RX ADMIN — BUSPIRONE HYDROCHLORIDE 30 MG: 10 TABLET ORAL at 08:12

## 2021-12-17 NOTE — PROGRESS NOTES
Patient care assumed with the change of shift report received from the outgoing nurse - Rocio Milton RN  Patient is alert and oriented, calm, pleasant and cooperative with assessment, preoccupied, denied SI, HI, AVH, pain, endorse  anxiety and depression. Pt requested for sleep aid and medication for anxiety. PO Atarax and Trazodone 50 mg administered. Monitoring continues. Patient observed asleep for 10 hours. Problem: Falls - Risk of  Goal: *Absence of Falls  Description: Document [place] Fall Risk and appropriate interventions in the flowsheet.   Outcome: Progressing Towards Goal  Note: Fall Risk Interventions:            Medication Interventions: Teach patient to arise slowly

## 2021-12-17 NOTE — BH NOTES
GROUP THERAPY PROGRESS NOTE    Patient did not participate in Discharge Planning Group.      Kirt Mayorga LPC LSATP [facility]

## 2021-12-17 NOTE — DISCHARGE INSTRUCTIONS
Patient Education        Adjustment Disorder: Care Instructions  Your Care Instructions     Adjustment disorder means that you have emotional or behavioral problems because of stress. But your response to the stress is far more severe than a normal response. It is severe enough to affect your work or social life and may cause depression and physical pains and problems. Events that may cause this response can include a divorce, money problems, or starting school or a new job. It might be anything that causes some stress. This disorder is most often a short-term problem. It happens within 3 months of the stressful event or change. If the response lasts longer than 6 months after the event ends, you may have a more serious disorder. Follow-up care is a key part of your treatment and safety. Be sure to make and go to all appointments, and call your doctor if you are having problems. It's also a good idea to know your test results and keep a list of the medicines you take. How can you care for yourself at home? · Go to all counseling sessions. Do not skip any because you are feeling better. · If your doctor prescribed medicines, take them exactly as prescribed. Call your doctor if you think you are having a problem with your medicine. You will get more details on the specific medicines your doctor prescribes. · Discuss the causes of your stress with a good friend or family member. Or you can join a support group for people with similar problems. Talking to others sometimes relieves stress. · Get at least 30 minutes of exercise on most days of the week. Walking is a good choice. You also may want to do other activities, such as running, swimming, cycling, or playing tennis or team sports. Relaxation techniques  Do relaxation exercises 10 to 20 minutes a day. You can play soothing, relaxing music while you do them, if you wish. · Tell others in your house that you are going to do your relaxation exercises.  Ask them not to disturb you. · Find a comfortable, quiet place. · Lie down on your back, or sit with your back straight. · Focus on your breathing. Make it slow and steady. · Breathe in through your nose. Breathe out through either your nose or mouth. · Breathe deeply, filling up the area between your navel and your rib cage. Breathe so that your belly goes up and down. · Do not hold your breath. · Breathe like this for 5 to 10 minutes. Notice the feeling of calmness throughout your whole body. As you continue to breathe slowly and deeply, relax by doing these next steps for another 5 to 10 minutes:  · Tighten and relax each muscle group in your body. Start at your toes, and work your way up to your head. · Imagine your muscle groups relaxing and getting heavy. · Empty your mind of all thoughts. · Let yourself relax more and more deeply. · Be aware of the state of calmness that surrounds you. · When your relaxation time is over, you can bring yourself back to alertness by moving your fingers and toes. Then move your hands and feet. And then move your entire body. Sometimes people fall asleep during relaxation. But they most often wake up soon. · Always give yourself time to return to full alertness before you drive a car. Wait to do anything that might cause an accident if you are not fully alert. Never play a relaxation tape while you drive a car. When should you call for help? Call 911 anytime you think you may need emergency care. For example, call if:    · You feel you cannot stop from hurting yourself or someone else. Keep the numbers for these national suicide hotlines: 0-062-628-TALK (8-917-999-498-770-6658) and 4-310-ARPXUYQ (8-952.592.2408). If you or someone you know talks about suicide or feeling hopeless, get help right away.    Watch closely for changes in your health, and be sure to contact your doctor if:    · You have new anxiety, or your anxiety gets worse.     · You have been feeling sad, depressed, or hopeless or have lost interest in things that you usually enjoy.     · You do not get better as expected. Where can you learn more? Go to http://www.gray.com/  Enter R087 in the search box to learn more about \"Adjustment Disorder: Care Instructions. \"  Current as of: June 16, 2021               Content Version: 13.0  © 7301-3328 [employer]. Care instructions adapted under license by [employer] (which disclaims liability or warranty for this information). If you have questions about a medical condition or this instruction, always ask your healthcare professional. [place] [address] any warranty or liability for your use of this information. If I feel I am at risk of hurting myself or others, I will call the crisis office and speak with a crisis worker who will assist me during my crisis.   [address]  [phone]  [facility] [place] [address] [phone] [address] [facility]  [place]  [place]-  [phone]

## 2021-12-17 NOTE — GROUP NOTE
DEVANTE  GROUP DOCUMENTATION INDIVIDUAL                                                                          Group Therapy Note    Date: 12/17/2021    Group Start Time: 0900  Group End Time: 1000  Group Topic: Topic Group    [address] [facility] [address]UTE [address] [facility] ([place], [place]), [address] [facility] GROUP DOCUMENTATION GROUP    Group Therapy Note    Attendees: 8         Attendance: Attended    Patient's Goal:  To participate in relaxation activity    Interventions/techniques: Supported-art    Interactions: Interacted appropriately    Mental Status: Calm    Behavior/appearance: Needed prompting    Goals Achieved:        Additional Notes: Pt declined active participation-left session early     Cora Jonas

## 2021-12-17 NOTE — DISCHARGE SUMMARY
PSYCHIATRIC DISCHARGE SUMMARY         IDENTIFICATION:    Patient Name  Calvin Zheng   Date of Birth 1990   [facility] [id number]   Medical Record Number  [id number]      Age  27 y.o. PCP None   Admit date:  12/14/2021    Discharge date: 12/17/2021   Room Number  316/01  @ [address]   Date of Service  12/17/2021            TYPE OF DISCHARGE: REGULAR               CONDITION AT DISCHARGE: improved and fair       PROVISIONAL & DISCHARGE DIAGNOSES:    Problem List  Never Reviewed          Codes Class    * (Principal) Adjustment disorder with disturbance of conduct ICD-10-CM: F43.24  ICD-9-CM: 309.3               Active Hospital Problems    *Adjustment disorder with disturbance of conduct        DISCHARGE DIAGNOSIS:   Axis I:  SEE ABOVE  Axis II: SEE ABOVE  Axis III: SEE ABOVE  Axis IV:  lack of structure  Axis V:  20 on admission, 70 on discharge     CC & HISTORY OF PRESENT ILLNESS:  \"homicidal ideation\"    The patient, Doni Hurley, is N 12 y.o.  WHITE/NON- male with a past psychiatric history significant for bipolar by chart review, who presents at this time with complaints of (and/or evidence of) the following emotional symptoms: suicidal thoughts/threats and agitation.  Additional symptomatology include homicidal ideation.  The above symptoms have been present for 2+ weeks. These symptoms are of moderate to high severity. These symptoms are constant in nature.  The patient's condition has been precipitated by psychosocial stressors. UDS: negative; BAL=0.      Per admission documentation, the patient was admitted due to making suicidal statements as well as homicidal threats to his family with whom he lives.     The patient is an unreliable historian. The patient corroborates the above narrative.  The patient contracts for safety on the unit but does not give consent for the team to contact collateral. The patient is amenable to initiating treatment while on the unit.     12/16 - no acute overnight events. Patient remains isolative but is cooperative with assessments and treatment. He is discharge focused and states he had no intentions of acting on any of the provocative statements he made to family prior to admission. Per SW, his family is pursuing protective orders against him as he stated explicitly that he planned to kill his family and then himself. He denies SI/HI/AVH/PI.         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    Drug use: Yes     Types: Marijuana, Heroin, Cocaine     Comment: pt states last use 1-1.5 yrs    Sexual activity: Not Currently   Other Topics Concern    Not on file   Social History Narrative    ** Merged History Encounter **          Social Determinants of Health     Financial Resource Strain:     Difficulty of Paying Living Expenses: Not on file   Food Insecurity:     Worried About Running Out of Food in the Last Year: Not on file    Edgard of Food in the Last Year: Not on file   Transportation Needs:     Lack of Transportation (Medical): Not on file    Lack of Transportation (Non-Medical):  Not on file   Physical Activity:     Days of Exercise per Week: Not on file    Minutes of Exercise per Session: Not on file   Stress:     Feeling of Stress : Not on file   Social Connections:     Frequency of Communication with Friends and Family: Not on file    Frequency of Social Gatherings with Friends and Family: Not on file    Attends Catholic Services: Not on file   [employer] of Clubs or Organizations: Not on file    Attends Club or Organization Meetings: Not on file    Marital Status: Not on file   Intimate Partner Violence:     Fear of Current or Ex-Partner: Not on file    Emotionally Abused: Not on file    Physically Abused: Not on file    Sexually Abused: Not on file   Housing Stability:     Unable to Pay for Housing in the Last Year: Not on file    Number of [place] in the Last Year: Not on file    Unstable Housing in the Last Year: Not on file      FAMILY HISTORY:   History reviewed. No pertinent family history. HOSPITALIZATION COURSE:    Augusto Barrera was admitted to the inpatient psychiatric unit [facility] for acute psychiatric stabilization in regards to symptomatology as described in the HPI above. The differential diagnosis at time of admission included: MDD vs adjustment disorder. While on the unit Augusto Barrera was involved in individual, group, occupational and milieu therapy. Psychiatric medications were adjusted during this hospitalization including Buspar and Atarax. Augusto Barrera demonstrated a slow, but progressive improvement in overall condition. Much of patient's initial presentation appeared to be related to situational stressors and psychological factors. Please see individual progress notes for more specific details regarding patient's hospitalization course. Patient with request for discharge today. There are no grounds to seek a TDO. At time of discharge, Augusto Barrera is without significant problems of depression, psychosis, or doni. Patient free of suicidal and homicidal ideations (appears to be at very low risk of suicide or homicide) and reports many positive predictive factors in terms of not attempting suicide or homicide.  Overall presentation at time of discharge is most consistent with the diagnosis of adjustment disorder with disturbance of conduct and likely intermittent explosive disorder. Patient has maximized benefit to be derived from acute inpatient psychiatric treatment. All members of the treatment team concur with each other in regards to plans for discharge today. Patient and family are aware and in agreement with discharge and discharge plan. LABS AND IMAGAING:    Labs Reviewed   CBC WITH AUTOMATED DIFF - Abnormal; Notable for the following components:       Result Value    WBC 12.2 (*)     NEUTROPHILS 76 (*)     IMMATURE GRANULOCYTES 1 (*)     ABS. NEUTROPHILS 9.3 (*)     ABS. IMM. GRANS. 0.1 (*)     All other components within normal limits   METABOLIC PANEL, COMPREHENSIVE - Abnormal; Notable for the following components:    Chloride 111 (*)     All other components within normal limits   DRUG SCREEN, URINE - Abnormal; Notable for the following components:    THC (TH-CANNABINOL) Positive (*)     All other components within normal limits   TOPIRAMATE - Abnormal; Notable for the following components:    Topiramate <1.5 (*)     All other components within normal limits   COVID-19 WITH INFLUENZA A/B   ETHYL ALCOHOL     No results found for: DS35, PHEN, PHENO, PHENT, DILF, DS39, PHENY, PTN, VALF2, VALAC, VALP, VALPR, DS6, CRBAM, CRBAMP, CARB2, XCRBAM  Admission on 12/14/2021, Discharged on 12/17/2021   Component Date Value Ref Range Status    ALCOHOL(ETHYL),SERUM 12/14/2021 <10  <10 MG/DL Final    WBC 12/14/2021 12.2* 4.1 - 11.1 K/uL Final    RBC 12/14/2021 4.89  4. 10 - 5.70 M/uL Final    HGB 12/14/2021 13.5  12.1 - 17.0 g/dL Final    HCT 12/14/2021 42.7  36.6 - 50.3 % Final    MCV 12/14/2021 87.3  80.0 - 99.0 FL Final    MCH 12/14/2021 27.6  26.0 - 34.0 PG Final    MCHC 12/14/2021 31.6  30.0 - 36.5 g/dL Final    RDW 12/14/2021 13.4  11.5 - 14.5 % Final    PLATELET 89/73/4356 019  150 - 400 K/uL Final    MPV 12/14/2021 9.6  8.9 - 12.9 FL Final    NRBC 12/14/2021 0.0  0  WBC Final    ABSOLUTE NRBC 12/14/2021 0.00  0.00 - 0.01 K/uL Final    NEUTROPHILS 12/14/2021 76* 32 - 75 % Final    LYMPHOCYTES 12/14/2021 15  12 - 49 % Final    MONOCYTES 12/14/2021 7  5 - 13 % Final    EOSINOPHILS 12/14/2021 0  0 - 7 % Final    BASOPHILS 12/14/2021 1  0 - 1 % Final    IMMATURE GRANULOCYTES 12/14/2021 1* 0.0 - 0.5 % Final    ABS. NEUTROPHILS 12/14/2021 9.3* 1.8 - 8.0 K/UL Final    ABS. LYMPHOCYTES 12/14/2021 1.8  0.8 - 3.5 K/UL Final    ABS. MONOCYTES 12/14/2021 0.9  0.0 - 1.0 K/UL Final    ABS. EOSINOPHILS 12/14/2021 0.0  0.0 - 0.4 K/UL Final    ABS. BASOPHILS 12/14/2021 0.1  0.0 - 0.1 K/UL Final    ABS. IMM. GRANS. 12/14/2021 0.1* 0.00 - 0.04 K/UL Final    DF 12/14/2021 AUTOMATED    Final    Sodium 12/14/2021 145  136 - 145 mmol/L Final    Potassium 12/14/2021 4.3  3.5 - 5.1 mmol/L Final    Chloride 12/14/2021 111* 97 - 108 mmol/L Final    CO2 12/14/2021 24  21 - 32 mmol/L Final    Anion gap 12/14/2021 10  5 - 15 mmol/L Final    Glucose 12/14/2021 99  65 - 100 mg/dL Final    BUN 12/14/2021 15  6 - 20 MG/DL Final    Creatinine 12/14/2021 0.95  0.70 - 1.30 MG/DL Final    BUN/Creatinine ratio 12/14/2021 16  12 - 20   Final    GFR est AA 12/14/2021 >60  >60 ml/min/1.73m2 Final    GFR est non-AA 12/14/2021 >60  >60 ml/min/1.73m2 Final    Calcium 12/14/2021 8.9  8.5 - 10.1 MG/DL Final    Bilirubin, total 12/14/2021 0.2  0.2 - 1.0 MG/DL Final    ALT (SGPT) 12/14/2021 21  12 - 78 U/L Final    AST (SGOT) 12/14/2021 21  15 - 37 U/L Final    Alk.  phosphatase 12/14/2021 85  45 - 117 U/L Final    Protein, total 12/14/2021 7.1  6.4 - 8.2 g/dL Final    Albumin 12/14/2021 4.2  3.5 - 5.0 g/dL Final    Globulin 12/14/2021 2.9  2.0 - 4.0 g/dL Final    A-G Ratio 12/14/2021 1.4  1.1 - 2.2   Final    AMPHETAMINES 12/14/2021 Negative  NEG   Final    BARBITURATES 12/14/2021 Negative  NEG   Final    BENZODIAZEPINES 12/14/2021 Negative  NEG   Final    COCAINE 12/14/2021 Negative  NEG   Final    METHADONE 12/14/2021 Negative  NEG   Final    OPIATES 12/14/2021 Negative  NEG   Final    PCP(PHENCYCLIDINE) 12/14/2021 Negative  NEG   Final    THC (TH-CANNABINOL) 12/14/2021 Positive* NEG   Final    Drug screen comment 12/14/2021 (NOTE)   Final    Topiramate 12/14/2021 <1.5* 2.0 - 25.0 ug/mL Final    SARS-CoV-2 12/14/2021 Not detected  NOTD   Final    Influenza A by PCR 12/14/2021 Not detected    Final    Influenza B by PCR 12/14/2021 Not detected    Final     No results found. DISPOSITION:    Home. Patient to f/u with psychiatric and psychotherapy appointments. Patient is to f/u with internist as directed. FOLLOW-UP CARE:    Activity as tolerated  Regular diet  Wound Care: none needed. Follow-up Information     Follow up With Specialties Details Why [address] [place] [address] Board  Go on 12/30/2021 Medication management appointment on Thursday, December 30th at 11:00AM in person visit. [address]  [phone]  Fax: [phone]  Crisis Line: [phone]                 PROGNOSIS:   Fair ---- based on nature of patient's pathology/ies and treatment compliance issues. Prognosis is greatly dependent upon patient's ability to remain sober and to follow up with scheduled appointments as well as to comply with psychiatric medications as prescribed. DISCHARGE MEDICATIONS:     Informed consent given for the use of following psychotropic medications:  Discharge Medication List as of 12/17/2021  1:29 PM      START taking these medications    Details   hydrOXYzine HCL (ATARAX) 50 mg tablet Take 1 Tablet by mouth two (2) times daily as needed for Anxiety for up to 60 days. Indications: anxious, Normal, Disp-60 Tablet, R-1      traZODone (DESYREL) 50 mg tablet Take 1 Tablet by mouth nightly as needed for Sleep (For insomnia).  Indications: insomnia associated with depression, Normal, Disp-30 Tablet, R-1         CONTINUE these medications which have CHANGED    Details   busPIRone (BUSPAR) 30 mg tablet Take 1 Tablet by mouth two (2) times a day. Indications: repeated episodes of anxiety, Normal, Disp-60 Tablet, R-1      topiramate (TOPAMAX) 50 mg tablet Take 1 Tablet by mouth daily. Indications: alcoholism, Normal, Disp-30 Tablet, R-1         CONTINUE these medications which have NOT CHANGED    Details   cloNIDine HCL (CATAPRES) 0.1 mg tablet Take 0.1 mg by mouth three (3) times daily as needed., Historical Med                    A coordinated, multidisplinary treatment team round was conducted with Cindy Lane is done daily here at [facility] This team consists of the nurse, psychiatric unit pharmacist,  and Gustav Bumpers. I have spent greater than 35 minutes on discharge work.     Signed:  Rosales Youssef MD  12/17/2021

## 2021-12-17 NOTE — BH NOTES
Behavioral Health Transition Record to Provider    Patient Name: Yamil Woodward  YOB: 1990  Medical Record Number: [id number]  Date of Admission: 12/14/2021  Date of Discharge: 12/17/2021    Attending Provider: Cherl Ahumada, *  Discharging Provider: Eric Chakraborty MD  To contact this individual call [phone] and ask the  to page. If unavailable, ask to be transferred to [address] [place] [address] Provider on call. [facility] Provider will be available on call 24/7 and during holidays. Primary Care Provider: None    No Known Allergies    Reason for Admission: Psychosis     Admission Diagnosis: Bipolar disorder ([facility] [address] [F31.9]    * No surgery found *    Results for orders placed or performed during the hospital encounter of 12/14/21   COVID-19 WITH INFLUENZA A/B   Result Value Ref Range    SARS-CoV-2 Not detected NOTD      Influenza A by PCR Not detected      Influenza B by PCR Not detected     ETHYL ALCOHOL   Result Value Ref Range    ALCOHOL(ETHYL),SERUM <10 <10 MG/DL   CBC WITH AUTOMATED DIFF   Result Value Ref Range    WBC 12.2 (H) 4.1 - 11.1 K/uL    RBC 4.89 4. 10 - 5.70 M/uL    HGB 13.5 12.1 - 17.0 g/dL    HCT 42.7 36.6 - 50.3 %    MCV 87.3 80.0 - 99.0 FL    MCH 27.6 26.0 - 34.0 PG    MCHC 31.6 30.0 - 36.5 g/dL    RDW 13.4 11.5 - 14.5 %    PLATELET 376 670 - 968 K/uL    MPV 9.6 8.9 - 12.9 FL    NRBC 0.0 0  WBC    ABSOLUTE NRBC 0.00 0.00 - 0.01 K/uL    NEUTROPHILS 76 (H) 32 - 75 %    LYMPHOCYTES 15 12 - 49 %    MONOCYTES 7 5 - 13 %    EOSINOPHILS 0 0 - 7 %    BASOPHILS 1 0 - 1 %    IMMATURE GRANULOCYTES 1 (H) 0.0 - 0.5 %    ABS. NEUTROPHILS 9.3 (H) 1.8 - 8.0 K/UL    ABS. LYMPHOCYTES 1.8 0.8 - 3.5 K/UL    ABS. MONOCYTES 0.9 0.0 - 1.0 K/UL    ABS. EOSINOPHILS 0.0 0.0 - 0.4 K/UL    ABS. BASOPHILS 0.1 0.0 - 0.1 K/UL    ABS. IMM.  GRANS. 0.1 (H) 0.00 - 0.04 K/UL    DF AUTOMATED     METABOLIC PANEL, COMPREHENSIVE   Result Value Ref Range    Sodium 145 136 - 145 mmol/L Potassium 4.3 3.5 - 5.1 mmol/L    Chloride 111 (H) 97 - 108 mmol/L    CO2 24 21 - 32 mmol/L    Anion gap 10 5 - 15 mmol/L    Glucose 99 65 - 100 mg/dL    BUN 15 6 - 20 MG/DL    Creatinine 0.95 0.70 - 1.30 MG/DL    BUN/Creatinine ratio 16 12 - 20      GFR est AA >60 >60 ml/min/1.73m2    GFR est non-AA >60 >60 ml/min/1.73m2    Calcium 8.9 8.5 - 10.1 MG/DL    Bilirubin, total 0.2 0.2 - 1.0 MG/DL    ALT (SGPT) 21 12 - 78 U/L    AST (SGOT) 21 15 - 37 U/L    Alk. phosphatase 85 45 - 117 U/L    Protein, total 7.1 6.4 - 8.2 g/dL    Albumin 4.2 3.5 - 5.0 g/dL    Globulin 2.9 2.0 - 4.0 g/dL    A-G Ratio 1.4 1.1 - 2.2     DRUG SCREEN, URINE   Result Value Ref Range    AMPHETAMINES Negative NEG      BARBITURATES Negative NEG      BENZODIAZEPINES Negative NEG      COCAINE Negative NEG      METHADONE Negative NEG      OPIATES Negative NEG      PCP(PHENCYCLIDINE) Negative NEG      THC (TH-CANNABINOL) Positive (A) NEG      Drug screen comment (NOTE)    TOPIRAMATE   Result Value Ref Range    Topiramate <1.5 (L) 2.0 - 25.0 ug/mL       Immunizations administered during this encounter: There is no immunization history on file for this patient. Screening for Metabolic Disorders for Patients on Antipsychotic Medications  (Data obtained from the EMR)    Estimated Body Mass Index  Estimated body mass index is 27.13 kg/m² as calculated from the following:    Height as of this encounter: 5' 10.87\" (1.8 m). Weight as of this encounter: 87.9 kg (193 lb 12.8 oz).      Vital Signs/Blood Pressure  Visit Vitals  /80   Pulse 94   Temp 98.6 °F (37 °C)   Resp 16   Ht 5' 10.87\" (1.8 m)   Wt 87.9 kg (193 lb 12.8 oz)   SpO2 100%   BMI 27.13 kg/m²       Blood Glucose/Hemoglobin A1c  Lab Results   Component Value Date/Time    Glucose 99 12/14/2021 03:25 PM       No results found for: HBA1C, [id number]     Lipid Panel  No results found for: CHOL, CHOLX, CHLST, CHOLV, 226401, HDL, HDLP, LDL, LDLC, DLDLP, TGLX, TRIGL, TRIGP, CHHD, CHHDX Discharge Diagnosis: Please refer to physician's discharge summary. Discharge Plan:   The patient Leann Rides exhibits the ability to control behavior in a less restrictive environment. Patient's level of functioning is improving. No assaultive/destructive behavior has been observed for the past 24 hours. No suicidal/homicidal threat or behavior has been observed for the past 24 hours. There is no evidence of serious medication side effects. Patient has not been in physical or protective restraints for at least the past 24 hours. Discharge Medication List and Instructions:   Current Discharge Medication List      START taking these medications    Details   hydrOXYzine HCL (ATARAX) 50 mg tablet Take 1 Tablet by mouth two (2) times daily as needed for Anxiety for up to 60 days. Indications: anxious  Qty: 60 Tablet, Refills: 1  Start date: 12/17/2021, End date: 2/15/2022      traZODone (DESYREL) 50 mg tablet Take 1 Tablet by mouth nightly as needed for Sleep (For insomnia). Indications: insomnia associated with depression  Qty: 30 Tablet, Refills: 1  Start date: 12/17/2021         CONTINUE these medications which have CHANGED    Details   busPIRone (BUSPAR) 30 mg tablet Take 1 Tablet by mouth two (2) times a day. Indications: repeated episodes of anxiety  Qty: 60 Tablet, Refills: 1  Start date: 12/17/2021      topiramate (TOPAMAX) 50 mg tablet Take 1 Tablet by mouth daily. Indications: alcoholism  Qty: 30 Tablet, Refills: 1  Start date: 12/18/2021         CONTINUE these medications which have NOT CHANGED    Details   cloNIDine HCL (CATAPRES) 0.1 mg tablet Take 0.1 mg by mouth three (3) times daily as needed. Unresulted Labs (24h ago, onward)            None        To obtain results of studies pending at discharge, please contact N/A.     Follow-up Information     Follow up With Specialties Details Why [address] [facility] Board  Go on 12/30/2021 Medication management appointment on Thursday, December 30th at 11:00AM in person visit. Martha [address] [facility]  [phone]  Fax: [phone]  Crisis Line: [phone]          Advanced Directive:   Does the patient have an appointed surrogate decision maker? Unknown   Does the patient have a Medical Advance Directive? Unknown   Does the patient have a Psychiatric Advance Directive? Unknown   If the patient does not have a surrogate or Medical Advance Directive AND Psychiatric Advance Directive, the patient was offered information on these advance directives. Unknown     Patient Instructions: Please continue all medications until otherwise directed by physician. Tobacco Cessation Discharge Plan:   Is the patient a smoker and needs referral for smoking cessation? No  Patient referred to the following for smoking cessation with an appointment? No   Patient was offered medication to assist with smoking cessation at discharge? No  Was education for smoking cessation added to the discharge instructions? No     Alcohol/Substance Abuse Discharge Plan:   Does the patient have a history of substance/alcohol abuse and requires a referral for treatment? Yes  Patient referred to the following for substance/alcohol abuse treatment with an appointment? Yes  Patient was offered medication to assist with alcohol cessation at discharge? No  Was education for substance/alcohol abuse added to discharge instructions? Yes     Patient discharged to Home; provided to the patient/caregiver either in hard copy or electronically. Continuing care paperwork was faxed to community mental health providers.

## 2022-02-18 ENCOUNTER — HOSPITAL ENCOUNTER (INPATIENT)
Age: 32
LOS: 5 days | Discharge: HOME | DRG: 885 | End: 2022-02-24
Attending: EMERGENCY MEDICINE | Admitting: PSYCHIATRY & NEUROLOGY
Payer: MEDICARE

## 2022-02-18 DIAGNOSIS — F43.24 ADJUSTMENT DISORDER WITH DISTURBANCE OF CONDUCT: ICD-10-CM

## 2022-02-18 DIAGNOSIS — F31.60 BIPOLAR AFFECTIVE DISORDER, CURRENT EPISODE MIXED, CURRENT EPISODE SEVERITY UNSPECIFIED (HCC): Primary | ICD-10-CM

## 2022-02-18 DIAGNOSIS — F31.2 BIPOLAR DISORDER, CURRENT EPISODE MANIC SEVERE WITH PSYCHOTIC FEATURES (HCC): ICD-10-CM

## 2022-02-18 LAB
ALBUMIN SERPL-MCNC: 5.3 G/DL (ref 3.5–5)
ALBUMIN/GLOB SERPL: 1.7 {RATIO} (ref 1.1–2.2)
ALP SERPL-CCNC: 85 U/L (ref 45–117)
ALT SERPL-CCNC: 17 U/L (ref 12–78)
AMPHET UR QL SCN: NEGATIVE
ANION GAP SERPL CALC-SCNC: 18 MMOL/L (ref 5–15)
AST SERPL-CCNC: 16 U/L (ref 15–37)
BARBITURATES UR QL SCN: NEGATIVE
BASOPHILS # BLD: 0.1 K/UL (ref 0–0.1)
BASOPHILS NFR BLD: 0 % (ref 0–1)
BENZODIAZ UR QL: NEGATIVE
BILIRUB SERPL-MCNC: 0.7 MG/DL (ref 0.2–1)
BUN SERPL-MCNC: 16 MG/DL (ref 6–20)
BUN/CREAT SERPL: 16 (ref 12–20)
CALCIUM SERPL-MCNC: 9.1 MG/DL (ref 8.5–10.1)
CANNABINOIDS UR QL SCN: POSITIVE
CHLORIDE SERPL-SCNC: 105 MMOL/L (ref 97–108)
CO2 SERPL-SCNC: 21 MMOL/L (ref 21–32)
COCAINE UR QL SCN: NEGATIVE
CREAT SERPL-MCNC: 1.02 MG/DL (ref 0.7–1.3)
DIFFERENTIAL METHOD BLD: ABNORMAL
DRUG SCRN COMMENT,DRGCM: ABNORMAL
EOSINOPHIL # BLD: 0 K/UL (ref 0–0.4)
EOSINOPHIL NFR BLD: 0 % (ref 0–7)
ERYTHROCYTE [DISTWIDTH] IN BLOOD BY AUTOMATED COUNT: 13.2 % (ref 11.5–14.5)
ETHANOL SERPL-MCNC: <10 MG/DL
FLUAV RNA SPEC QL NAA+PROBE: NOT DETECTED
FLUBV RNA SPEC QL NAA+PROBE: NOT DETECTED
GLOBULIN SER CALC-MCNC: 3.2 G/DL (ref 2–4)
GLUCOSE SERPL-MCNC: 91 MG/DL (ref 65–100)
HCT VFR BLD AUTO: 45.2 % (ref 36.6–50.3)
HGB BLD-MCNC: 14.8 G/DL (ref 12.1–17)
IMM GRANULOCYTES # BLD AUTO: 0.1 K/UL (ref 0–0.04)
IMM GRANULOCYTES NFR BLD AUTO: 0 % (ref 0–0.5)
LYMPHOCYTES # BLD: 2.1 K/UL (ref 0.8–3.5)
LYMPHOCYTES NFR BLD: 17 % (ref 12–49)
MCH RBC QN AUTO: 27.6 PG (ref 26–34)
MCHC RBC AUTO-ENTMCNC: 32.7 G/DL (ref 30–36.5)
MCV RBC AUTO: 84.2 FL (ref 80–99)
METHADONE UR QL: NEGATIVE
MONOCYTES # BLD: 0.7 K/UL (ref 0–1)
MONOCYTES NFR BLD: 6 % (ref 5–13)
NEUTS SEG # BLD: 9.3 K/UL (ref 1.8–8)
NEUTS SEG NFR BLD: 77 % (ref 32–75)
NRBC # BLD: 0 K/UL (ref 0–0.01)
NRBC BLD-RTO: 0 PER 100 WBC
OPIATES UR QL: NEGATIVE
PCP UR QL: NEGATIVE
PLATELET # BLD AUTO: 365 K/UL (ref 150–400)
PMV BLD AUTO: 9.4 FL (ref 8.9–12.9)
POTASSIUM SERPL-SCNC: 3.7 MMOL/L (ref 3.5–5.1)
PROT SERPL-MCNC: 8.5 G/DL (ref 6.4–8.2)
RBC # BLD AUTO: 5.37 M/UL (ref 4.1–5.7)
SARS-COV-2, COV2: NOT DETECTED
SODIUM SERPL-SCNC: 144 MMOL/L (ref 136–145)
WBC # BLD AUTO: 12.2 K/UL (ref 4.1–11.1)

## 2022-02-18 PROCEDURE — 87636 SARSCOV2 & INF A&B AMP PRB: CPT

## 2022-02-18 PROCEDURE — 36415 COLL VENOUS BLD VENIPUNCTURE: CPT

## 2022-02-18 PROCEDURE — 74011250637 HC RX REV CODE- 250/637: Performed by: NURSE PRACTITIONER

## 2022-02-18 PROCEDURE — 80307 DRUG TEST PRSMV CHEM ANLYZR: CPT

## 2022-02-18 PROCEDURE — 82077 ASSAY SPEC XCP UR&BREATH IA: CPT

## 2022-02-18 PROCEDURE — 99285 EMERGENCY DEPT VISIT HI MDM: CPT

## 2022-02-18 PROCEDURE — 85025 COMPLETE CBC W/AUTO DIFF WBC: CPT

## 2022-02-18 PROCEDURE — 80053 COMPREHEN METABOLIC PANEL: CPT

## 2022-02-18 RX ORDER — LORAZEPAM 1 MG/1
1 TABLET ORAL
Status: COMPLETED | OUTPATIENT
Start: 2022-02-18 | End: 2022-02-18

## 2022-02-18 RX ADMIN — LORAZEPAM 1 MG: 1 TABLET ORAL at 19:58

## 2022-02-18 NOTE — ED TRIAGE NOTES
Arrives in police custody under ECO c/o mental health problem. Pt denies SI/HI. Pt reporting AVH. Pt hyper verbal and tangential, reports his mother petitioned for [employer] because he was \"acting funny. \" Pt cooperative.

## 2022-02-19 PROBLEM — F31.2 BIPOLAR DISORDER, CURRENT EPISODE MANIC SEVERE WITH PSYCHOTIC FEATURES (HCC): Status: ACTIVE | Noted: 2022-02-19

## 2022-02-19 PROCEDURE — 74011250637 HC RX REV CODE- 250/637

## 2022-02-19 PROCEDURE — 65220000003 HC RM SEMIPRIVATE PSYCH

## 2022-02-19 PROCEDURE — 74011250637 HC RX REV CODE- 250/637: Performed by: PSYCHIATRY & NEUROLOGY

## 2022-02-19 RX ORDER — TOPIRAMATE 25 MG/1
50 TABLET ORAL DAILY
Status: DISCONTINUED | OUTPATIENT
Start: 2022-02-20 | End: 2022-02-24 | Stop reason: HOSPADM

## 2022-02-19 RX ORDER — HYDROXYZINE 50 MG/1
50 TABLET, FILM COATED ORAL
Status: ON HOLD | COMMUNITY
End: 2022-02-23 | Stop reason: SDUPTHER

## 2022-02-19 RX ORDER — HALOPERIDOL 5 MG/ML
5 INJECTION INTRAMUSCULAR
Status: DISCONTINUED | OUTPATIENT
Start: 2022-02-19 | End: 2022-02-24 | Stop reason: HOSPADM

## 2022-02-19 RX ORDER — CLONIDINE HYDROCHLORIDE 0.1 MG/1
0.1 TABLET ORAL
Status: DISCONTINUED | OUTPATIENT
Start: 2022-02-19 | End: 2022-02-24 | Stop reason: HOSPADM

## 2022-02-19 RX ORDER — OLANZAPINE 5 MG/1
5 TABLET ORAL
Status: DISCONTINUED | OUTPATIENT
Start: 2022-02-19 | End: 2022-02-24 | Stop reason: HOSPADM

## 2022-02-19 RX ORDER — BENZTROPINE MESYLATE 1 MG/1
1 TABLET ORAL
Status: DISCONTINUED | OUTPATIENT
Start: 2022-02-19 | End: 2022-02-24 | Stop reason: HOSPADM

## 2022-02-19 RX ORDER — TRAZODONE HYDROCHLORIDE 50 MG/1
50 TABLET ORAL
Status: DISCONTINUED | OUTPATIENT
Start: 2022-02-19 | End: 2022-02-24 | Stop reason: HOSPADM

## 2022-02-19 RX ORDER — ACETAMINOPHEN 325 MG/1
650 TABLET ORAL
Status: DISCONTINUED | OUTPATIENT
Start: 2022-02-19 | End: 2022-02-24 | Stop reason: HOSPADM

## 2022-02-19 RX ORDER — ADHESIVE BANDAGE
30 BANDAGE TOPICAL DAILY PRN
Status: DISCONTINUED | OUTPATIENT
Start: 2022-02-19 | End: 2022-02-24 | Stop reason: HOSPADM

## 2022-02-19 RX ORDER — HYDROXYZINE 25 MG/1
50 TABLET, FILM COATED ORAL
Status: DISCONTINUED | OUTPATIENT
Start: 2022-02-19 | End: 2022-02-24 | Stop reason: HOSPADM

## 2022-02-19 RX ORDER — BUSPIRONE HYDROCHLORIDE 10 MG/1
30 TABLET ORAL 2 TIMES DAILY
Status: DISCONTINUED | OUTPATIENT
Start: 2022-02-19 | End: 2022-02-24 | Stop reason: HOSPADM

## 2022-02-19 RX ORDER — DIPHENHYDRAMINE HYDROCHLORIDE 50 MG/ML
50 INJECTION, SOLUTION INTRAMUSCULAR; INTRAVENOUS
Status: DISCONTINUED | OUTPATIENT
Start: 2022-02-19 | End: 2022-02-24 | Stop reason: HOSPADM

## 2022-02-19 RX ORDER — LORAZEPAM 2 MG/ML
1 INJECTION INTRAMUSCULAR
Status: DISCONTINUED | OUTPATIENT
Start: 2022-02-19 | End: 2022-02-24 | Stop reason: HOSPADM

## 2022-02-19 RX ADMIN — TRAZODONE HYDROCHLORIDE 50 MG: 50 TABLET ORAL at 21:11

## 2022-02-19 RX ADMIN — HYDROXYZINE HYDROCHLORIDE 50 MG: 25 TABLET, FILM COATED ORAL at 21:10

## 2022-02-19 RX ADMIN — HYDROXYZINE HYDROCHLORIDE 50 MG: 25 TABLET, FILM COATED ORAL at 08:14

## 2022-02-19 RX ADMIN — OLANZAPINE 5 MG: 5 TABLET, FILM COATED ORAL at 08:27

## 2022-02-19 RX ADMIN — BUSPIRONE HYDROCHLORIDE 30 MG: 10 TABLET ORAL at 18:04

## 2022-02-19 NOTE — ED NOTES
TRANSFER - OUT REPORT:    Verbal report given to [facility] on Elana Pryor  being transferred to [place] for routine progression of care       Report consisted of patients Situation, Background, Assessment and   Recommendations(SBAR). Information from the following report(s) SBAR and ED Summary was reviewed with the receiving nurse. Lines:       Opportunity for questions and clarification was provided.       Patient transported with:   Kael Guadarrama

## 2022-02-19 NOTE — PROGRESS NOTES
Ernestine Pierce was admitted for complaints of feeling like he 'was trying to get out of something in his head'. He says his mood has been unstable with periods of not sleeping for up to two days at a time. He Is alert and oriented. He denies SI, HI, AVH and pain. He was cooperative with the admission process. He says he is here because he wants help and might need his meds changed.
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
## 2022-02-19 NOTE — ED PROVIDER NOTES
EMERGENCY DEPARTMENT HISTORY AND PHYSICAL EXAM    Date: 2/18/2022  Patient Name: Bri Ordoñez    History of Presenting Illness     Chief Complaint   Patient presents with   [address] Problem         History Provided By: Kiet Nolan    Chief Complaint: behavioral health problem  Duration: onset today   Timing:  Acute  Quality: Patient states he wants to kill himself and his mother  Severity: Severe  Modifying Factors: None  Associated Symptoms: hallucinatioins      HPI: Bri Ordoñez is a 27 y.o. male with a PMH of bipolar affective disorder who presents under E CO with [place] . Patient's mother called [facility] for an E CO. Patient threatened to kill his mother and himself today. Patient states that he just wanted to leave his house and get away from his mother. Patient states \"I am tired\" patient denies having a plan to harm himself. Patient admits to hearing voices. He does not say what the voices are telling him. Patient continually stating that he wants to see his baby. During interview patient had his right hand elevated and continually waving into the air. Patient also stated that he tried to tattoo himself on his hands. PCP: None    Current Outpatient Medications   Medication Sig Dispense Refill    busPIRone (BUSPAR) 30 mg tablet Take 1 Tablet by mouth two (2) times a day. Indications: repeated episodes of anxiety 60 Tablet 1    topiramate (TOPAMAX) 50 mg tablet Take 1 Tablet by mouth daily. Indications: alcoholism 30 Tablet 1    traZODone (DESYREL) 50 mg tablet Take 1 Tablet by mouth nightly as needed for Sleep (For insomnia). Indications: insomnia associated with depression 30 Tablet 1    cloNIDine HCL (CATAPRES) 0.1 mg tablet Take 0.1 mg by mouth three (3) times daily as needed.          Past History     Past Medical History:  Past Medical History:   Diagnosis Date    Bipolar 1 disorder ([facility] [address]     Psychiatric disorder     bipolar       Past Surgical History:  History reviewed. No pertinent surgical history. Family History:  History reviewed. No pertinent family history. Social History:  Social History     Tobacco Use    Smoking status: Former Smoker    Smokeless tobacco: Never Used   Substance Use Topics    Alcohol use: Not Currently     Alcohol/week: 7.0 standard drinks     Types: 7 Cans of beer per week     Comment: ; pt reports clean 1 year    Drug use: Yes     Types: Marijuana, Heroin, Cocaine     Comment: pt states last use 1-1.5 yrs       Allergies:  No Known Allergies      Review of Systems   Review of Systems   Unable to perform ROS: Psychiatric disorder       Physical Exam     Vitals:    02/18/22 1455   BP: (!) 154/95   Pulse: (!) 118   Resp: 18   Temp: 98.1 °F (36.7 °C)   SpO2: 98%   Weight: 87.5 kg (193 lb)   Height: 5' 10\" (1.778 m)     Physical Exam  Vitals and nursing note reviewed. Constitutional:       Appearance: He is well-developed. HENT:      Head: Normocephalic and atraumatic. Right Ear: External ear normal.   Eyes:      General:         Right eye: No discharge. Left eye: No discharge. Conjunctiva/sclera: Conjunctivae normal.   Cardiovascular:      Rate and Rhythm: Normal rate and regular rhythm. Heart sounds: Normal heart sounds. Pulmonary:      Effort: Pulmonary effort is normal. No respiratory distress. Breath sounds: Normal breath sounds. No wheezing. Abdominal:      General: Bowel sounds are normal.      Palpations: Abdomen is soft. Tenderness: There is no abdominal tenderness. Musculoskeletal:         General: Normal range of motion. Cervical back: Normal range of motion and neck supple. Lymphadenopathy:      Cervical: No cervical adenopathy. Skin:     General: Skin is warm and dry. Comments: Multiple abrasions on dorsal aspect of both hands multiple tatoos   Neurological:      Mental Status: He is alert and oriented to person, place, and time. Cranial Nerves:  No cranial nerve deficit. Psychiatric:         Attention and Perception: He is inattentive. Mood and Affect: Mood is depressed. Speech: Speech is tangential.         Behavior: Behavior is cooperative. Thought Content: Thought content includes homicidal and suicidal ideation. Thought content does not include homicidal or suicidal plan. Cognition and Memory: Cognition normal.         Judgment: Judgment is inappropriate. Diagnostic Study Results     Labs -     Recent Results (from the past 12 hour(s))   CBC WITH AUTOMATED DIFF    Collection Time: 02/18/22  4:41 PM   Result Value Ref Range    WBC 12.2 (H) 4.1 - 11.1 K/uL    RBC 5.37 4.10 - 5.70 M/uL    HGB 14.8 12.1 - 17.0 g/dL    HCT 45.2 36.6 - 50.3 %    MCV 84.2 80.0 - 99.0 FL    MCH 27.6 26.0 - 34.0 PG    MCHC 32.7 30.0 - 36.5 g/dL    RDW 13.2 11.5 - 14.5 %    PLATELET 845 392 - 342 K/uL    MPV 9.4 8.9 - 12.9 FL    NRBC 0.0 0  WBC    ABSOLUTE NRBC 0.00 0.00 - 0.01 K/uL    NEUTROPHILS 77 (H) 32 - 75 %    LYMPHOCYTES 17 12 - 49 %    MONOCYTES 6 5 - 13 %    EOSINOPHILS 0 0 - 7 %    BASOPHILS 0 0 - 1 %    IMMATURE GRANULOCYTES 0 0.0 - 0.5 %    ABS. NEUTROPHILS 9.3 (H) 1.8 - 8.0 K/UL    ABS. LYMPHOCYTES 2.1 0.8 - 3.5 K/UL    ABS. MONOCYTES 0.7 0.0 - 1.0 K/UL    ABS. EOSINOPHILS 0.0 0.0 - 0.4 K/UL    ABS. BASOPHILS 0.1 0.0 - 0.1 K/UL    ABS. IMM.  GRANS. 0.1 (H) 0.00 - 0.04 K/UL    DF AUTOMATED     METABOLIC PANEL, COMPREHENSIVE    Collection Time: 02/18/22  4:41 PM   Result Value Ref Range    Sodium 144 136 - 145 mmol/L    Potassium 3.7 3.5 - 5.1 mmol/L    Chloride 105 97 - 108 mmol/L    CO2 21 21 - 32 mmol/L    Anion gap 18 (H) 5 - 15 mmol/L    Glucose 91 65 - 100 mg/dL    BUN 16 6 - 20 MG/DL    Creatinine 1.02 0.70 - 1.30 MG/DL    BUN/Creatinine ratio 16 12 - 20      GFR est AA >60 >60 ml/min/1.73m2    GFR est non-AA >60 >60 ml/min/1.73m2    Calcium 9.1 8.5 - 10.1 MG/DL    Bilirubin, total 0.7 0.2 - 1.0 MG/DL    ALT (SGPT) 17 12 - 78 U/L    AST (SGOT) 16 15 - 37 U/L    Alk. phosphatase 85 45 - 117 U/L    Protein, total 8.5 (H) 6.4 - 8.2 g/dL    Albumin 5.3 (H) 3.5 - 5.0 g/dL    Globulin 3.2 2.0 - 4.0 g/dL    A-G Ratio 1.7 1.1 - 2.2     ETHYL ALCOHOL    Collection Time: 02/18/22  4:41 PM   Result Value Ref Range    ALCOHOL(ETHYL),SERUM <10 <10 MG/DL   DRUG SCREEN, URINE    Collection Time: 02/18/22  4:41 PM   Result Value Ref Range    AMPHETAMINES Negative NEG      BARBITURATES Negative NEG      BENZODIAZEPINES Negative NEG      COCAINE Negative NEG      METHADONE Negative NEG      OPIATES Negative NEG      PCP(PHENCYCLIDINE) Negative NEG      THC (TH-CANNABINOL) Positive (A) NEG      Drug screen comment (NOTE)    COVID-19 WITH INFLUENZA A/B    Collection Time: 02/18/22  4:41 PM   Result Value Ref Range    SARS-CoV-2 Not detected NOTD      Influenza A by PCR Not detected      Influenza B by PCR Not detected         Radiologic Studies -   No orders to display     CT Results  (Last 48 hours)    None        CXR Results  (Last 48 hours)    None            Medical Decision Making   I am the first provider for this patient. I reviewed the vital signs, available nursing notes, past medical history, past surgical history, family history and social history. Vital Signs-Reviewed the patient's vital signs. Records Reviewed: Nursing Notes and Old Medical Records    Provider Notes (Medical Decision Making):   DDX bipolar disorder schizophrenia acute psychoses SI bipolar manic phase major depressive disorder  ED Course as of 02/18/22 2327 Fri Feb 18, 2022 2123 Patient has been evaluated by crisis TDO bed search pending [AN]   2124 Patient is medically cleared [AN]   2140 TDO has been petitioned. [place] crisis will do follow up and med search. [AN]   4528 Received signout on patient. Rounded on him. At this time, he is sleeping comfortably. TDO has been petitioned.  [TZ]   1044 Care of patient has been transferred to attending [AN] ED Course User Index  [AN] Coretta Knowles NP  [TZ] Sherill Peabody, MD              Procedures:  Procedures    Please note that this dictation was completed with Dragon, computer voice recognition software. Quite often unanticipated grammatical, syntax, homophones, and other interpretive errors are inadvertently transcribed by the computer software. Please disregard these errors. Additionally, please excuse any errors that have escaped final proofreading. Diagnosis     Clinical Impression:   1.  Bipolar affective disorder, current episode mixed, current episode severity unspecified ([facility] [address]

## 2022-02-19 NOTE — PROGRESS NOTES
[address] [place] [address] Admission Pharmacy Medication Reconciliation    Information obtained from:  Patient interview, RX Query  RxQuery data available1: yes    Comments/recommendations:    1) The patient was interviewed regarding current PTA medication list and drug allergies. Patient endorses compliance and was able to name medications which is based on regimen upon discharge from previous [address] [place] [address] encounter 12/15/21 - 12/19/21    2) Medication changes to PTA list:    Added  Hydroxyzine HCl 50 mg po bid prn anxiety  Notes  Other: Kratom and green tea extract po    3) The [place] Prescription Monitoring Program () was accessed to determine fill history of any controlled medications:  No controlled meds since May 2021 - 7 days' supply of Suboxone 8/2 mg film from Feb 2020 to may 2021 (no longer taking)     1RxQuery pharmacy benefit data reflects medications filled and processed through the patient's insurance, however                this data does NOT capture whether the medication was picked up or is currently being taken by the patient. Past Medical History/Disease States:  Past Medical History:   Diagnosis Date    Anxiety and depression     Bipolar 1 disorder ([facility] [address]     Depression     Hypertension     Psychiatric disorder     bipolar         Patient allergies: Allergies as of 02/18/2022    (No Known Allergies)         Prior to Admission Medications   Prescriptions Last Dose Informant  Taking?   busPIRone (BUSPAR) 30 mg tablet 2/18/2022 at Unknown time   Yes   Sig: Take 1 Tablet by mouth two (2) times a day. Indications: repeated episodes of anxiety   cloNIDine HCL (CATAPRES) 0.1 mg tablet 2/18/2022 at Unknown time Other  Yes   Sig: Take 0.1 mg by mouth three (3) times daily as needed. hydrOXYzine HCL (ATARAX) 50 mg tablet 2/18/2022 at Unknown time   Yes   Sig: Take 50 mg by mouth two (2) times daily as needed for Anxiety. topiramate (TOPAMAX) 50 mg tablet 2/18/2022 at Unknown time   Yes   Sig: Take 1 Tablet by mouth daily. Indications: alcoholism   traZODone (DESYREL) 50 mg tablet 2/18/2022 at Unknown time   Yes   Sig: Take 1 Tablet by mouth nightly as needed for Sleep (For insomnia).  Indications: insomnia associated with depression          Thank you,  Heidy Mcdermott, [address] [facility]

## 2022-02-19 NOTE — PROGRESS NOTES
Problem: Altered Thought Process (Adult/Pediatric)  Goal: *STG: Participates in treatment plan  Outcome: Progressing Towards Goal  Goal: *STG: Remains safe in hospital  Outcome: Progressing Towards Goal  Goal: *STG: Absence of lethality  Outcome: Progressing Towards Goal

## 2022-02-19 NOTE — PROGRESS NOTES
Forensic Restraints in place, officer at the bedside, sign posted at the head of the bed, nursing supervisor visualized skin. Arms  skin normal texture, color and Hands  skin normal texture, color Wrists skin normal texture, color. Hands cuffed in front of body.

## 2022-02-19 NOTE — PROGRESS NOTES
Behavioral Services  Medicare Certification Upon Admission    I certify that this patient's inpatient psychiatric hospital admission is medically necessary for:    [x] (1) Treatment which could reasonably be expected to improve this patient's condition,       [x] (2) Or for diagnostic study;     AND     [x](2) The inpatient psychiatric services are provided while the individual is under the care of a physician and are included in the individualized plan of care.     Estimated length of stay/service 5 - 7 days    Plan for post-hospital care Per social work     Electronically signed by Marielena Neely MD on 2/19/2022 at 5:53 PM

## 2022-02-20 PROCEDURE — 74011250637 HC RX REV CODE- 250/637: Performed by: PSYCHIATRY & NEUROLOGY

## 2022-02-20 PROCEDURE — 74011250637 HC RX REV CODE- 250/637

## 2022-02-20 PROCEDURE — 65220000003 HC RM SEMIPRIVATE PSYCH

## 2022-02-20 RX ORDER — IBUPROFEN 200 MG
1 TABLET ORAL DAILY
Status: DISCONTINUED | OUTPATIENT
Start: 2022-02-20 | End: 2022-02-24 | Stop reason: HOSPADM

## 2022-02-20 RX ORDER — DM/P-EPHED/ACETAMINOPH/DOXYLAM 30-7.5/3
2 LIQUID (ML) ORAL
Status: DISCONTINUED | OUTPATIENT
Start: 2022-02-20 | End: 2022-02-24 | Stop reason: HOSPADM

## 2022-02-20 RX ORDER — RISPERIDONE 1 MG/1
1 TABLET, FILM COATED ORAL 2 TIMES DAILY
Status: DISCONTINUED | OUTPATIENT
Start: 2022-02-20 | End: 2022-02-23

## 2022-02-20 RX ADMIN — Medication 2 MG: at 16:05

## 2022-02-20 RX ADMIN — BUSPIRONE HYDROCHLORIDE 30 MG: 10 TABLET ORAL at 16:04

## 2022-02-20 RX ADMIN — HYDROXYZINE HYDROCHLORIDE 50 MG: 25 TABLET, FILM COATED ORAL at 12:07

## 2022-02-20 RX ADMIN — RISPERIDONE 1 MG: 1 TABLET ORAL at 16:03

## 2022-02-20 RX ADMIN — TOPIRAMATE 50 MG: 25 TABLET, FILM COATED ORAL at 08:14

## 2022-02-20 RX ADMIN — BUSPIRONE HYDROCHLORIDE 30 MG: 10 TABLET ORAL at 08:14

## 2022-02-20 RX ADMIN — RISPERIDONE 1 MG: 1 TABLET ORAL at 12:07

## 2022-02-20 RX ADMIN — ACETAMINOPHEN 650 MG: 325 TABLET ORAL at 16:04

## 2022-02-20 NOTE — PROGRESS NOTES
Bedside shift change report given to Rodney Can RN (oncoming nurse) by [employer] RN (offgoing nurse). Report included the following information SBAR, Kardex, MAR, and Accordion. 26 y/o pt received awake in bed in room. Pt is A&Ox4 with \"depressed\" mood and restricted, sad affect. Pt denied SI/HI/AVH. No delusional statements voiced. Pt denied any physical complaints. Pt cooperative with assessment. Pt medication and meal compliant. No behavioral concerns. Bedside shift change report given to Andria RN (oncoming nurse) by Rodney Can RN (offgoing nurse). Report included the following information SBAR, Kardex, MAR and Accordion.      Problem: Altered Thought Process (Adult/Pediatric)  Goal: *STG: Participates in treatment plan  Outcome: Progressing Towards Goal  Goal: *STG: Remains safe in hospital  Outcome: Progressing Towards Goal  Goal: *STG: Seeks staff when feelings of anxiety and fear arise  Outcome: Progressing Towards Goal  Goal: *STG: Complies with medication therapy  Outcome: Progressing Towards Goal

## 2022-02-20 NOTE — H&P
[facility] HISTORY AND PHYSICAL    Name:  Orvis Gowers  MR#:  [id number]  :  1990  ACCOUNT #:  [de-identified]  ADMIT DATE:  2022    PSYCHIATRIC INTAKE NOTE    CHIEF COMPLAINT:  \"I've been trying to leave my house, but just can't seem to do it. \"    HISTORY OF PRESENT ILLNESS:  This is a 39-year-old  male with history of bipolar disorder. He lives with his family. He reports that, he has been in there at home with his family for all his life. He wants to have his own place. He is attempting to leave the home to find his own place to stay and every time he tries to get away from the neighborhood, he finds himself back there. He is distressed about this and upset. He apparently has been off his medications for a bit of time. Unclear exactly how long, he has not been stating. He was raised in this neighborhood and stated he has just not been able to raise enough money to leave his home and he has been stuck there. Loose presentation and slightly disorganized, but responds appropriately to questions. By the ED report, he is brought in under ECO, advised by mother because he was acting funny. PAST PSYCHIATRIC HISTORY:  Bipolar disorder with one prior hospitalizations in recent years, according to the patient. PAST MEDICAL HISTORY:  Denies. ALLERGIES:  NONE KNOWN DRUG ALLERGIES. SOCIAL HISTORY:  He lives with his family. Smokes about half a pack of cigarettes a day. He vapes only occasionally. Smokes marijuana. Denies any alcohol currently or other drugs, but he used to be a heavy alcoholic and he quit about 2 years ago.   He points out that he has been on medications and prescribed BuSpar, Trileptal, clonidine in the past, but he is no longer taking the Trileptal, he stated it made him feel funny, but he continues to take the clonidine and BuSpar and he is open to these medications and he has been off and on Depakote and lithium, but it has been a long time because of his alcoholism. Here for management of his condition. MENTAL STATUS EXAM:  Adult male, calm, cooperative. Clear, coherent speech of average rate, volume and tone. No aggression or violence. Denies suicidal or homicidal ideations and no auditory or visual hallucinations. Aware of his surroundings, location, and situation. Here for management of his condition. DIAGNOSES:  Bipolar disorder, doni versus adjustment disorder, mixed features. PLAN:  Admit for safety and stabilization, medication modification as needed, group therapy, individual therapy. ESTIMATED LENGTH OF STAY:  Five to seven days. DISPOSITION:  Planning with Social Work. STRENGTHS:  Willingness for treatment. DISABILITIES:  Substance use history and limited experience on his own, attempting to be on his own, in his minimum condition. MATILDE Hunt MD      [id number]  D:  02/19/2022 17:55  T:  02/19/2022 23:09  JOB #:  [id number]

## 2022-02-20 NOTE — PROGRESS NOTES
Problem: Falls - Risk of  Goal: *Absence of Falls  Description: Document Kamla Marking Fall Risk and appropriate interventions in the flowsheet. Outcome: Progressing Towards Goal  Note: Fall Risk Interventions:            Medication Interventions: Teach patient to arise slowly                   Problem: Altered Thought Process (Adult/Pediatric)  Goal: *STG: Participates in treatment plan  Outcome: Progressing Towards Goal  Goal: *STG: Remains safe in hospital  Outcome: Progressing Towards Goal  Goal: *STG: Seeks staff when feelings of anxiety and fear arise  Outcome: Progressing Towards Goal  Goal: *STG: Complies with medication therapy  Outcome: Progressing Towards Goal  Goal: *STG: Demonstrates ability to understand and use improved judgment in daily activities and relationships  Outcome: Progressing Towards Goal       0759-2491: Assumed care of patient after receiving shift report from outgoing nurse. Patient withdrawn to room upon assessment. Affect is smiling, mood is anxious. Pt cooperative with vitals and assessment. A&O x 4. Independent in ADLs. Insight and concentration present. Gait is steady. Appetite patterns WNL. Denies AVH/SI/HI. Patient endorses \"moderate\" amount of anxiety at this time. Patient reports that he feels a lot better but he is still worried. When asked, patient reports, being anxious about trying to \"get life together\". Patient encouraged to break down goals and take things step by step. Patient requested received PRN atarax and trazodone for anxiety and sleep at approximately 2110. Pt denies pain. Patient medication and diet compliant. Pt encouraged to continue to participate in care. 8714-8394: Patient resting quietly in bed at this time. 3370-2739: Pt has been observed throughout the night. Total hours slept approximately 11. No s/s of distress noted. Patient has remained safe.

## 2022-02-20 NOTE — BH NOTES
Psychiatric Progress Note    Patient: Jinny Valentin MRN: [id number]  SSN: [id number]    YOB: 1990  Age: 27 y.o. Sex: male      Admit Date: 2/18/2022    LOS: 1 day     Subjective:     Jinny Valentin  reports feeling less anxious and moods are fair. Discussed medications and BOWEN option after his provider Dr. David Cali called in giving further history: ETOH abuse, opiate abuse, non-compliance. Denies SI/HI/AH/VH. No aggression or violence. Appropriately interactive and aware. Tolerating medications well. Eating well and sleeping better. Willing to get on [place] family of medications.     Objective:     Vitals:    02/19/22 0235 02/19/22 0750 02/19/22 2030 02/20/22 0731   BP: (!) 139/92 139/78 134/75 122/76   Pulse: 74 100 60 72   Resp: 20 18 16 16   Temp: 98.4 °F (36.9 °C) 97.7 °F (36.5 °C) 98.3 °F (36.8 °C) 97.9 °F (36.6 °C)   SpO2: 100% 99% 99% 98%   Weight:       Height:            Mental Status Exam:   Sensorium  oriented to time, place and person   Relations cooperative   Eye Contact appropriate   Appearance:  age appropriate   Speech:  normal volume and non-pressured   Thought Process: goal directed   Thought Content free of delusions and free of hallucinations   Suicidal ideations none   Mood:  euthymic   Affect:  Fair range   Memory   adequate   Concentration:  adequate   Insight:  fair   Judgment:  fair and limited       MEDICATIONS:  Current Facility-Administered Medications   Medication Dose Route Frequency    OLANZapine (ZyPREXA) tablet 5 mg  5 mg Oral Q6H PRN    haloperidol lactate (HALDOL) injection 5 mg  5 mg IntraMUSCular Q6H PRN    benztropine (COGENTIN) tablet 1 mg  1 mg Oral BID PRN    diphenhydrAMINE (BENADRYL) injection 50 mg  50 mg IntraMUSCular BID PRN    hydrOXYzine HCL (ATARAX) tablet 50 mg  50 mg Oral TID PRN    LORazepam (ATIVAN) injection 1 mg  1 mg IntraMUSCular Q4H PRN    traZODone (DESYREL) tablet 50 mg  50 mg Oral QHS PRN    acetaminophen (TYLENOL) tablet 650 mg  650 mg Oral Q4H PRN    magnesium hydroxide (MILK OF MAGNESIA) 400 mg/5 mL oral suspension 30 mL  30 mL Oral DAILY PRN    busPIRone (BUSPAR) tablet 30 mg  30 mg Oral BID    cloNIDine HCL (CATAPRES) tablet 0.1 mg  0.1 mg Oral TID PRN    topiramate (TOPAMAX) tablet 50 mg  50 mg Oral DAILY      DISCUSSION:   the risks and benefits of the proposed medication  patient given opportunity to ask questions    Lab/Data Review: All lab results for the last 24 hours reviewed. No results found for this or any previous visit (from the past 24 hour(s)). Assessment:     Principal Problem:    Bipolar disorder, current episode manic severe with psychotic features ([facility] [address] (2/19/2022)        Plan:     Continue current care  Collateral information  Risperdal 1 mg PO BID with plan to transition to [facility]  Disposition planning with social work    I certify that this patient's inpatient psychiatric hospital services furnished since the previous certification were, and continue to be, required for treatment that could reasonably be expected to improve the patient's condition, or for diagnostic study, and that the patient continues to need, on a daily basis, active treatment furnished directly by or requiring the supervision of inpatient psychiatric facility personnel. In addition, the hospital records show that services furnished were intensive treatment services, admission or related services, or equivalent services.   Signed By: Kurt Bourne MD     February 20, 2022

## 2022-02-20 NOTE — BH NOTES
PSYCHOSOCIAL ASSESSMENT  :Patient identifying info:   Milagro Brower is a 27 y.o., male admitted 2/18/2022  2:47 PM     Presenting problem and precipitating factors: 27year old  male admitted from [facility] ED under TDO. Family called EMS because of bizarre behavior. Pt reports that he wants to move out of family home but is unable to afford this at this time. Pt reports he has not been taking his medication regularly. Pt denies SI/HI and [facility]. Mental status assessment:     Strengths/Recreation/Coping Skills: Insured, stable housing, connection to [facility]    Collateral information: Thompson Laws, father, [phone], emergency contact    Current psychiatric /substance abuse providers and contact info: [facility], Dr. Jorden Rice and Joann Holt    Previous psychiatric/substance abuse providers and response to treatment: 4 prior hospitalizations, 1 to [facility] 12/2021. Pt has hx of rx of BuSpar, Trileptal, Depakote, Lithium and Clonidine. Family history of mental illness or substance abuse: None    Substance abuse history:  UDS+ THC  Social History     Tobacco Use    Smoking status: Former Smoker    Smokeless tobacco: Never Used   Substance Use Topics    Alcohol use: Not Currently     Alcohol/week: 7.0 standard drinks     Types: 7 Cans of beer per week     Comment: ; pt reports clean 1 year       History of biomedical complications associated with substance abuse: None stated    Patient's current acceptance of treatment or motivation for change: TDO    Family constellation: Pt is single without children. Pt lives with family. Is significant other involved?  No    Describe support system: Fair, has support from family    Describe living arrangements and home environment: Lives with parents    GUARDIAN/POA: NO    Guardian Name: None    Guardian Contact: None    Health issues:   Hospital Problems  Never Reviewed          Codes Class Noted POA    * (Principal) Bipolar disorder, current episode manic severe with psychotic features [facility]) ICD-10-CM: F31.2  ICD-9-CM: 296.44  2022 Unknown              Trauma history: None stated    Legal issues: No pending legal charges    History of [employer] service: None    Financial status: SSDI    Muslim/cultural factors: None stated    Education/work history: Achieved high school level of education, hx of working for [employer]    Have you been licensed as a health care professional (current or ): No    Leisure and recreation preferences: None stated    Describe coping skills: Limited, ineffective    Ceasar Del Real  2022

## 2022-02-21 ENCOUNTER — DOCUMENTATION ONLY (OUTPATIENT)
Dept: BEHAVIORAL HEALTH UNIT | Age: 32
End: 2022-02-21

## 2022-02-21 PROCEDURE — 91303 HC RX REV CODE- 250/636: CPT | Performed by: PSYCHIATRY & NEUROLOGY

## 2022-02-21 PROCEDURE — 74011250636 HC RX REV CODE- 250/636: Performed by: PSYCHIATRY & NEUROLOGY

## 2022-02-21 PROCEDURE — 74011250637 HC RX REV CODE- 250/637: Performed by: PSYCHIATRY & NEUROLOGY

## 2022-02-21 PROCEDURE — 99231 SBSQ HOSP IP/OBS SF/LOW 25: CPT | Performed by: PSYCHIATRY & NEUROLOGY

## 2022-02-21 PROCEDURE — 65220000003 HC RM SEMIPRIVATE PSYCH

## 2022-02-21 PROCEDURE — XW023S6 INTRODUCTION OF COVID-19 VACCINE DOSE 1 INTO MUSCLE, PERCUTANEOUS APPROACH, NEW TECHNOLOGY GROUP 6: ICD-10-PCS | Performed by: PSYCHIATRY & NEUROLOGY

## 2022-02-21 PROCEDURE — 74011250637 HC RX REV CODE- 250/637

## 2022-02-21 RX ADMIN — BUSPIRONE HYDROCHLORIDE 30 MG: 10 TABLET ORAL at 08:19

## 2022-02-21 RX ADMIN — BUSPIRONE HYDROCHLORIDE 30 MG: 10 TABLET ORAL at 17:25

## 2022-02-21 RX ADMIN — JNJ-78436735 1 DOSE: 50000000000 SUSPENSION INTRAMUSCULAR at 15:09

## 2022-02-21 RX ADMIN — RISPERIDONE 1 MG: 1 TABLET ORAL at 17:26

## 2022-02-21 RX ADMIN — HYDROXYZINE HYDROCHLORIDE 50 MG: 25 TABLET, FILM COATED ORAL at 17:26

## 2022-02-21 RX ADMIN — CLONIDINE HYDROCHLORIDE 0.1 MG: 0.1 TABLET ORAL at 17:26

## 2022-02-21 RX ADMIN — RISPERIDONE 1 MG: 1 TABLET ORAL at 08:18

## 2022-02-21 RX ADMIN — TOPIRAMATE 50 MG: 25 TABLET, FILM COATED ORAL at 08:18

## 2022-02-21 RX ADMIN — ACETAMINOPHEN 650 MG: 325 TABLET ORAL at 17:26

## 2022-02-21 RX ADMIN — TRAZODONE HYDROCHLORIDE 50 MG: 50 TABLET ORAL at 20:34

## 2022-02-21 NOTE — PROGRESS NOTES
Laboratory monitoring for mood stabilizer and antipsychotics:    Recommended baseline monitoring has been completed based on this patient's current medication regimen. The patient is currently taking the following medication(s):   Current Facility-Administered Medications   Medication Dose Route Frequency    risperiDONE (RisperDAL) tablet 1 mg  1 mg Oral BID    nicotine (NICODERM CQ) 21 mg/24 hr patch 1 Patch  1 Patch TransDERmal DAILY    busPIRone (BUSPAR) tablet 30 mg  30 mg Oral BID    topiramate (TOPAMAX) tablet 50 mg  50 mg Oral DAILY       Height, Weight, BMI Estimation  Estimated body mass index is 27.69 kg/m² as calculated from the following:    Height as of this encounter: 177.8 cm (70\"). Weight as of this encounter: 87.5 kg (193 lb). Renal Function, Hepatic Function and Chemistry  Estimated Creatinine Clearance: 107.4 mL/min (by C-G formula based on SCr of 1.02 mg/dL). Lab Results   Component Value Date/Time    Sodium 144 02/18/2022 04:41 PM    Potassium 3.7 02/18/2022 04:41 PM    Chloride 105 02/18/2022 04:41 PM    CO2 21 02/18/2022 04:41 PM    Anion gap 18 (H) 02/18/2022 04:41 PM    Glucose 91 02/18/2022 04:41 PM    BUN 16 02/18/2022 04:41 PM    Creatinine 1.02 02/18/2022 04:41 PM    BUN/Creatinine ratio 16 02/18/2022 04:41 PM    GFR est AA >60 02/18/2022 04:41 PM    GFR est non-AA >60 02/18/2022 04:41 PM    Calcium 9.1 02/18/2022 04:41 PM    ALT (SGPT) 17 02/18/2022 04:41 PM    Alk.  phosphatase 85 02/18/2022 04:41 PM    Protein, total 8.5 (H) 02/18/2022 04:41 PM    Albumin 5.3 (H) 02/18/2022 04:41 PM    Globulin 3.2 02/18/2022 04:41 PM    A-G Ratio 1.7 02/18/2022 04:41 PM    Bilirubin, total 0.7 02/18/2022 04:41 PM       Lab Results   Component Value Date/Time    Glucose 91 02/18/2022 04:41 PM       Lab Results   Component Value Date/Time    Hemoglobin A1c 5.7 (H) 02/22/2022 06:05 AM       Hematology  Lab Results   Component Value Date/Time    WBC 12.2 (H) 02/18/2022 04:41 PM    HGB 14.8 02/18/2022 04:41 PM    HCT 45.2 02/18/2022 04:41 PM    PLATELET 091 99/99/3192 04:41 PM    MCV 84.2 02/18/2022 04:41 PM       Lipids  Lab Results   Component Value Date/Time    Cholesterol, total 164 02/22/2022 06:05 AM    HDL Cholesterol 38 02/22/2022 06:05 AM    LDL, calculated 104.4 (H) 02/22/2022 06:05 AM    Triglyceride 108 02/22/2022 06:05 AM    CHOL/HDL Ratio 4.3 02/22/2022 06:05 AM       Thyroid Function  Lab Results   Component Value Date/Time    TSH 1.81 02/22/2022 06:05 AM     Vitals  Visit Vitals  BP (!) 143/77 (BP 1 Location: Left upper arm, BP Patient Position: Sitting)   Pulse 74   Temp 98.6 °F (37 °C)   Resp 14   Ht 177.8 cm (70\")   Wt 87.5 kg (193 lb)   SpO2 100%   BMI 27.69 kg/m²       Fernando Floyd, PHARMD, BCPS  [phone] (pharmacy)

## 2022-02-21 NOTE — BH NOTES
Behavioral Health Treatment Team Note            Patient goal(s) for today: continue taking medication as prescribed, engage in unit activities, participate in hygiene/ADLs, follow directions from staff, contact support team, prepare for discharge  Treatment team focus/goals: medication adjustments, dispo planning, update support system  Progress note: Pt seen from bed during treatment team rounds. Pt is alert and oriented. Pt denies SI/HI. Pt's mood is euthymic, affect is full range. Pt's thought process is coherent. Pt's insight and judgment is impaired, reliability is fair.   Social work department will continue to coordinate discharge plans.      LOS:  2                        Expected LOS: TBD     Financial concerns/prescription coverage: [address]  Medicare Part A&B with B Extended Coverage and [facility]  Date of last family contact:   Bella Puckett, father, [phone], emergency contact                               Family requesting physician contact today: N/A  Discharge plan: TBD  Guns in the home: None reported                                                        Outpatient provider(s):Hanh BOONE, Dr. Zenaida Iniguez and Tasia Domingo        Participating treatment team members: Zenon Carrillo, SENDY Mckeon and Dr. Gerry Vega

## 2022-02-21 NOTE — GROUP NOTE
DEVANTE  GROUP DOCUMENTATION INDIVIDUAL                                                                          Group Therapy Note    Date: 2/21/2022    Group Start Time: 1100  Group End Time: 1200  Group Topic: Topic Group    [facility] [place] [address] ACUTE BEHAV [facility]    [facility] GROUP    Group Therapy Note    Attendees: 6         Attendance: Did not attend    Patient's Goal:      Interventions/techniques  Kendell Lieberman

## 2022-02-21 NOTE — PROGRESS NOTES
Spiritual Care Assessment/Progress Note  [facility]      NAME: Robert Ace      MRN: [id number]  AGE: 27 y.o. SEX: male  Jain Affiliation: Gnosticist   Language: English     2/21/2022     Total Time (in minutes): 5     Spiritual Assessment begun in [place] [address] [address] through conversation with:         []Patient        [] Family    [] Friend(s)        Reason for Consult: Initial/Spiritual assessment, patient floor     Spiritual beliefs: (Please include comment if needed)     [] Identifies with a courtney tradition:         [] Supported by a courtney community:            [] Claims no spiritual orientation:           [] Seeking spiritual identity:                [] Adheres to an individual form of spirituality:           [x] Not able to assess:                           Identified resources for coping:      [] Prayer                               [] Music                  [] Guided Imagery     [] Family/friends                 [] Pet visits     [] Devotional reading                         [x] Unknown     [] Other:                                            Interventions offered during this visit: (See comments for more details)                Plan of Care:     [] Support spiritual and/or cultural needs    [] Support AMD and/or advance care planning process      [] Support grieving process   [] Coordinate Rites and/or Rituals    [] Coordination with community clergy   [] No spiritual needs identified at this time   [] Detailed Plan of Care below (See Comments)  [] Make referral to Music Therapy  [] Make referral to Pet Therapy     [] Make referral to Addiction services  [] Make referral to [facility]  [] Make referral to Spiritual Care Partner  [] No future visits requested        [x] Contact Spiritual Care for further referrals     Comments:  visit for initial spiritual assessment. Patient appears to be resting. Door closed for privacy.   Please contact spiritual care for further referral or consult. Rev.  Sulema Fields MDiv, M, [facility]   paging service: [phone])

## 2022-02-21 NOTE — GROUP NOTE
DEVANTE  GROUP DOCUMENTATION INDIVIDUAL                                                                          Group Therapy Note    Date: 2/21/2022    Group Start Time: 0900  Group End Time: 1000  Group Topic: Topic Group    [facility] [place] [address] ACUTE BEHAV [facility]    [place], [address] [facility] GROUP DOCUMENTATION GROUP    Group Therapy Note    Attendees: 9         Attendance: Did not attend    Patient's Goal:      Interventions/techniques  Kendell Lieberman

## 2022-02-21 NOTE — BH NOTES
Patient requesting covid vaccine. Patient provided all information, all questions answered. Patient tolerated.

## 2022-02-21 NOTE — BH NOTES
GROUP THERAPY PROGRESS NOTE    Patient did not participate in Discharge Planning Group. Asked by nursing staff not to wake patient up.     Froylan Arellano LPC LSATP CSAC

## 2022-02-21 NOTE — GROUP NOTE
DEVANTE  GROUP DOCUMENTATION INDIVIDUAL                                                                          Group Therapy Note    Date: 2/21/2022    Group Start Time: 1500  Group End Time: 1600  Group Topic: Recreational/Music Therapy    [facility] [place] [address] ACUTE BEHAV [facility]    [place], [address] [facility] GROUP DOCUMENTATION GROUP    Group Therapy Note    Attendees: 10         Attendance: Attended    Patient's Goal:  To concentrate on selected task    Interventions/techniques: Supported-crafts,games,music    Interactions: Interacted appropriately    Mental Status: Calm    Behavior/appearance: Needed prompting    Goals Achieved:        Additional Notes:  Pt declined active participation-left session early    Julio César Malagon

## 2022-02-21 NOTE — PROGRESS NOTES
Problem: Falls - Risk of  Goal: *Absence of Falls  Description: Document [place] Fall Risk and appropriate interventions in the flowsheet. Outcome: Progressing Towards Goal  Note: Fall Risk Interventions:            Medication Interventions: Teach patient to arise slowly                   Problem: Altered Thought Process (Adult/Pediatric)  Goal: *STG: Participates in treatment plan  Outcome: Progressing Towards Goal  Goal: *STG: Complies with medication therapy  Outcome: Progressing Towards Goal     [id number]: Assumed care of patient after receiving shift report from outgoing nurse. Patient isolative to room upon assessment. Affect is smiling, mood is euthymic. Pt cooperative with vitals and assessment. A&O x 4. Independent in ADLs. Insight and concentration present. Gait is steady. Appetite patterns WNL. Denies AVH/SI/HI. Pt denies pain. Patient medication and diet compliant. Pt encouraged to continue to participate in care. [id number]: Patient resting quietly in bed. No further issues noted at this time. [id number]: Pt has been observed throughout the night. Total hours slept approximately 11. No s/s of distress noted. Patient has remained safe.

## 2022-02-21 NOTE — PROGRESS NOTES
Bedside shift change report given to Sara Martinez RN (oncoming nurse) by [employer] RN (offgoing nurse). Report included the following information SBAR, Kardex, MAR, and Accordion. 26 y/o pt received this morning asleep in room. Pt promptly awakened when his name was called. Pt is A&Ox4 with \"alright\" mood and restricted affect. Pt denied SI/HI/AVH. Pt did not voice any delusional statements. Pt cooperative with assessment. Pt medication and meal compliant. No behavioral concerns.        Problem: Altered Thought Process (Adult/Pediatric)  Goal: *STG: Participates in treatment plan  Outcome: Progressing Towards Goal  Goal: *STG: Remains safe in hospital  Outcome: Progressing Towards Goal  Goal: *STG: Seeks staff when feelings of anxiety and fear arise  Outcome: Progressing Towards Goal

## 2022-02-22 LAB
CHOLEST SERPL-MCNC: 164 MG/DL
EST. AVERAGE GLUCOSE BLD GHB EST-MCNC: 117 MG/DL
HBA1C MFR BLD: 5.7 % (ref 4–5.6)
HDLC SERPL-MCNC: 38 MG/DL
HDLC SERPL: 4.3 {RATIO} (ref 0–5)
LDLC SERPL CALC-MCNC: 104.4 MG/DL (ref 0–100)
TRIGL SERPL-MCNC: 108 MG/DL (ref ?–150)
TSH SERPL DL<=0.05 MIU/L-ACNC: 1.81 UIU/ML (ref 0.36–3.74)
VLDLC SERPL CALC-MCNC: 21.6 MG/DL

## 2022-02-22 PROCEDURE — 74011250637 HC RX REV CODE- 250/637: Performed by: PSYCHIATRY & NEUROLOGY

## 2022-02-22 PROCEDURE — 80061 LIPID PANEL: CPT

## 2022-02-22 PROCEDURE — 84443 ASSAY THYROID STIM HORMONE: CPT

## 2022-02-22 PROCEDURE — 99232 SBSQ HOSP IP/OBS MODERATE 35: CPT | Performed by: PSYCHIATRY & NEUROLOGY

## 2022-02-22 PROCEDURE — 65220000003 HC RM SEMIPRIVATE PSYCH

## 2022-02-22 PROCEDURE — 83036 HEMOGLOBIN GLYCOSYLATED A1C: CPT

## 2022-02-22 PROCEDURE — 74011250637 HC RX REV CODE- 250/637

## 2022-02-22 RX ADMIN — CLONIDINE HYDROCHLORIDE 0.1 MG: 0.1 TABLET ORAL at 12:19

## 2022-02-22 RX ADMIN — TOPIRAMATE 50 MG: 25 TABLET, FILM COATED ORAL at 08:19

## 2022-02-22 RX ADMIN — BUSPIRONE HYDROCHLORIDE 30 MG: 10 TABLET ORAL at 16:59

## 2022-02-22 RX ADMIN — ACETAMINOPHEN 650 MG: 325 TABLET ORAL at 21:19

## 2022-02-22 RX ADMIN — ACETAMINOPHEN 650 MG: 325 TABLET ORAL at 12:19

## 2022-02-22 RX ADMIN — RISPERIDONE 1 MG: 1 TABLET ORAL at 16:59

## 2022-02-22 RX ADMIN — CLONIDINE HYDROCHLORIDE 0.1 MG: 0.1 TABLET ORAL at 21:19

## 2022-02-22 RX ADMIN — BUSPIRONE HYDROCHLORIDE 30 MG: 10 TABLET ORAL at 08:19

## 2022-02-22 RX ADMIN — RISPERIDONE 1 MG: 1 TABLET ORAL at 08:19

## 2022-02-22 NOTE — BH NOTES
Psychiatric Progress Note    Patient: Esther Huang MRN: [id number]  SSN: [id number]    YOB: 1990  Age: 27 y.o. Sex: male      Admit Date: 2/18/2022    LOS: 3 days     Subjective:     Esther Huang  reports feeling less anxious and moods are fair. Discussed medications and BOWEN option after his provider Dr. Gary Alberto called in giving further history: ETOH abuse, opiate abuse, non-compliance. Denies SI/HI/AH/VH. No aggression or violence. Appropriately interactive and aware. Tolerating medications well. Eating well and sleeping better. Willing to get on [place] family of medications. 02/21 - no acute overnight events. Patient slept 11 hours overnight. He endorses anxiety due primarily to his roommate making bizarre comments throughout the day. Patient denies SI/HI/AVH, he is medication compliant and has been otherwise in fair behavioral control. Patient requests transfer to quieter side of the unit, order placed. He is able to make his needs known and vitals have been unremarkable. 02/22 - the patient has been visible, able to make his needs known and with no episodes of SI/HI/ANMOL/PI. Patient states he is speaking with his family and is allowed to return home when he discharges. Patient otherwise in fair behavioral control, medication compliant and got no PRNs for agitation or aggression. Patient volunteered from court.     Objective:     Vitals:    02/20/22 2030 02/21/22 0936 02/21/22 1935 02/22/22 0816   BP: (!) 107/55 (!) 141/127 126/73 (!) 143/77   Pulse: 77 72 65 74   Resp: 14 16 17 14   Temp: 98 °F (36.7 °C) 97.9 °F (36.6 °C) 97.8 °F (36.6 °C) 98.6 °F (37 °C)   SpO2: 99% 100%  100%   Weight:       Height:            Mental Status Exam:   Sensorium  oriented to time, place and person   Relations cooperative   Eye Contact appropriate   Appearance:  age appropriate   Speech:  normal volume and non-pressured   Thought Process: goal directed   Thought Content free of delusions and free of hallucinations   Suicidal ideations none   Mood:  euthymic   Affect:  Fair range   Memory   adequate   Concentration:  adequate   Insight:  fair   Judgment:  fair and limited       MEDICATIONS:  Current Facility-Administered Medications   Medication Dose Route Frequency    risperiDONE (RisperDAL) tablet 1 mg  1 mg Oral BID    nicotine buccal (POLACRILEX) lozenge 2 mg  2 mg Oral Q2H PRN    nicotine (NICODERM CQ) 21 mg/24 hr patch 1 Patch  1 Patch TransDERmal DAILY    OLANZapine (ZyPREXA) tablet 5 mg  5 mg Oral Q6H PRN    haloperidol lactate (HALDOL) injection 5 mg  5 mg IntraMUSCular Q6H PRN    benztropine (COGENTIN) tablet 1 mg  1 mg Oral BID PRN    diphenhydrAMINE (BENADRYL) injection 50 mg  50 mg IntraMUSCular BID PRN    hydrOXYzine HCL (ATARAX) tablet 50 mg  50 mg Oral TID PRN    LORazepam (ATIVAN) injection 1 mg  1 mg IntraMUSCular Q4H PRN    traZODone (DESYREL) tablet 50 mg  50 mg Oral QHS PRN    acetaminophen (TYLENOL) tablet 650 mg  650 mg Oral Q4H PRN    magnesium hydroxide (MILK OF MAGNESIA) 400 mg/5 mL oral suspension 30 mL  30 mL Oral DAILY PRN    busPIRone (BUSPAR) tablet 30 mg  30 mg Oral BID    cloNIDine HCL (CATAPRES) tablet 0.1 mg  0.1 mg Oral TID PRN    topiramate (TOPAMAX) tablet 50 mg  50 mg Oral DAILY      DISCUSSION:   the risks and benefits of the proposed medication  patient given opportunity to ask questions    Lab/Data Review: All lab results for the last 24 hours reviewed.      Recent Results (from the past 24 hour(s))   LIPID PANEL    Collection Time: 02/22/22  6:05 AM   Result Value Ref Range    Cholesterol, total 164 <200 MG/DL    Triglyceride 108 <150 MG/DL    HDL Cholesterol 38 MG/DL    LDL, calculated 104.4 (H) 0 - 100 MG/DL    VLDL, calculated 21.6 MG/DL    CHOL/HDL Ratio 4.3 0.0 - 5.0     HEMOGLOBIN A1C WITH EAG    Collection Time: 02/22/22  6:05 AM   Result Value Ref Range    Hemoglobin A1c 5.7 (H) 4.0 - 5.6 %    Est. average glucose 117 mg/dL   TSH 3RD GENERATION    Collection Time: 02/22/22  6:05 AM   Result Value Ref Range    TSH 1.81 0.36 - 3.74 uIU/mL         Assessment:     Principal Problem:    Bipolar disorder, current episode manic severe with psychotic features ([facility] [address] (2/19/2022)        Plan:     Continue current care  Collateral information  Risperdal 1 mg PO BID for psychosis  Disposition planning with social work    I certify that this patient's inpatient psychiatric hospital services furnished since the previous certification were, and continue to be, required for treatment that could reasonably be expected to improve the patient's condition, or for diagnostic study, and that the patient continues to need, on a daily basis, active treatment furnished directly by or requiring the supervision of inpatient psychiatric facility personnel. In addition, the hospital records show that services furnished were intensive treatment services, admission or related services, or equivalent services.   Signed By: Nuno Hernandez MD     February 22, 2022

## 2022-02-22 NOTE — PROGRESS NOTES
Pt transferred to general side per order. Remains calm and cooperative. Compliant with medications/meals. Denies any further needs. Remains on q15 observation.

## 2022-02-22 NOTE — BH NOTES
Psychiatric Progress Note    Patient: Dmitriy Velazquez MRN: [id number]  SSN: [id number]    YOB: 1990  Age: 27 y.o. Sex: male      Admit Date: 2/18/2022    LOS: 2 days     Subjective:     Dmitriy Velazquez  reports feeling less anxious and moods are fair. Discussed medications and BOWEN option after his provider Dr. Alycia Nieves called in giving further history: ETOH abuse, opiate abuse, non-compliance. Denies SI/HI/AH/VH. No aggression or violence. Appropriately interactive and aware. Tolerating medications well. Eating well and sleeping better. Willing to get on [place] family of medications. 02/21 - no acute overnight events. Patient slept 11 hours overnight. He endorses anxiety due primarily to his roommate making bizarre comments throughout the day. Patient denies SI/HI/AVH, he is medication compliant and has been otherwise in fair behavioral control. Patient requests transfer to quieter side of the unit, order placed. He is able to make his needs known and vitals have been unremarkable.     Objective:     Vitals:    02/20/22 0731 02/20/22 2030 02/21/22 0936 02/21/22 1935   BP: 122/76 (!) 107/55 (!) 141/127 126/73   Pulse: 72 77 72 65   Resp: 16 14 16 17   Temp: 97.9 °F (36.6 °C) 98 °F (36.7 °C) 97.9 °F (36.6 °C) 97.8 °F (36.6 °C)   SpO2: 98% 99% 100%    Weight:       Height:            Mental Status Exam:   Sensorium  oriented to time, place and person   Relations cooperative   Eye Contact appropriate   Appearance:  age appropriate   Speech:  normal volume and non-pressured   Thought Process: goal directed   Thought Content free of delusions and free of hallucinations   Suicidal ideations none   Mood:  euthymic   Affect:  Fair range   Memory   adequate   Concentration:  adequate   Insight:  fair   Judgment:  fair and limited       MEDICATIONS:  Current Facility-Administered Medications   Medication Dose Route Frequency    risperiDONE (RisperDAL) tablet 1 mg  1 mg Oral BID    nicotine buccal (POLACRILEX) lozenge 2 mg  2 mg Oral Q2H PRN    nicotine (NICODERM CQ) 21 mg/24 hr patch 1 Patch  1 Patch TransDERmal DAILY    OLANZapine (ZyPREXA) tablet 5 mg  5 mg Oral Q6H PRN    haloperidol lactate (HALDOL) injection 5 mg  5 mg IntraMUSCular Q6H PRN    benztropine (COGENTIN) tablet 1 mg  1 mg Oral BID PRN    diphenhydrAMINE (BENADRYL) injection 50 mg  50 mg IntraMUSCular BID PRN    hydrOXYzine HCL (ATARAX) tablet 50 mg  50 mg Oral TID PRN    LORazepam (ATIVAN) injection 1 mg  1 mg IntraMUSCular Q4H PRN    traZODone (DESYREL) tablet 50 mg  50 mg Oral QHS PRN    acetaminophen (TYLENOL) tablet 650 mg  650 mg Oral Q4H PRN    magnesium hydroxide (MILK OF MAGNESIA) 400 mg/5 mL oral suspension 30 mL  30 mL Oral DAILY PRN    busPIRone (BUSPAR) tablet 30 mg  30 mg Oral BID    cloNIDine HCL (CATAPRES) tablet 0.1 mg  0.1 mg Oral TID PRN    topiramate (TOPAMAX) tablet 50 mg  50 mg Oral DAILY      DISCUSSION:   the risks and benefits of the proposed medication  patient given opportunity to ask questions    Lab/Data Review: All lab results for the last 24 hours reviewed. No results found for this or any previous visit (from the past 24 hour(s)). Assessment:     Principal Problem:    Bipolar disorder, current episode manic severe with psychotic features ([facility] [address] (2/19/2022)        Plan:     Continue current care  Collateral information  Risperdal 1 mg PO BID with plan to transition to [facility]  Disposition planning with social work    I certify that this patient's inpatient psychiatric hospital services furnished since the previous certification were, and continue to be, required for treatment that could reasonably be expected to improve the patient's condition, or for diagnostic study, and that the patient continues to need, on a daily basis, active treatment furnished directly by or requiring the supervision of inpatient psychiatric facility personnel.  In addition, the hospital records show that services furnished were intensive treatment services, admission or related services, or equivalent services.   Signed By: Robert Justice MD     February 21, 2022

## 2022-02-22 NOTE — PROGRESS NOTES
Problem: Altered Thought Process (Adult/Pediatric)  Goal: *STG: Participates in treatment plan  Outcome: Progressing Towards Goal  Goal: *STG: Seeks staff when feelings of anxiety and fear arise  Outcome: Progressing Towards Goal     Pt is seen in his room, calm and cooperative with assessment. Smiles appropriately, alert and oriented. Denies SI/HI/AVH, pain or discomfort. Compliant with morning medication and meal time. Will continue to monitor for safety.

## 2022-02-22 NOTE — GROUP NOTE
DEVANTE  GROUP DOCUMENTATION INDIVIDUAL                                                                          Group Therapy Note    Date: 2/22/2022    Group Start Time: 1500  Group End Time: 1600  Group Topic: Recreational/Music Therapy    [facility] 3 ACUTE BEHAV [facility] Session, [address] [address] GROUP    Group Therapy Note    Attendees: 6         Attendance: Did not attend    Patient's Goal:      Interventions/techniques:  Rui Flores

## 2022-02-22 NOTE — GROUP NOTE
DEVANTE  GROUP DOCUMENTATION INDIVIDUAL                                                                          Group Therapy Note    Date: 2/22/2022    Group Start Time: 0900  Group End Time: 1000  Group Topic: Topic Group    [address] ACUTE BEHAV [place] [address] GROUP    Group Therapy Note    Attendees: 7         Attendance: Did not attend    Patient's Goal:      Interventions/techniques  Annel Ny

## 2022-02-22 NOTE — PROGRESS NOTES
Problem: Falls - Risk of  Goal: *Absence of Falls  Description: Document Melisa Kelly Fall Risk and appropriate interventions in the flowsheet.   Outcome: Progressing Towards Goal  Note: Fall Risk Interventions:            Medication Interventions: Teach patient to arise slowly                   Problem: Altered Thought Process (Adult/Pediatric)  Goal: *STG: Remains safe in hospital  Outcome: Progressing Towards Goal

## 2022-02-22 NOTE — BH NOTES
Met with Gertchen Vaughn for a check in and to work on his safety plan. He reports feeling better and hoping to discharge tomorrow. He completed his safety plan with this writer. He reports not being nervous about a patient who was yelling because he knows staff is working to help that person. He reports that he does not want to hurt himself or others and he will not allow anything to hurt him. He was reassured he is safe and he reports he is safe. He decided to stay and watch tv after check in and safety plan were completed. He was calm and cooperative and focused. His mood was euthymic and affect was congruent. He denies any issues or concerns.     Xiomy Castellanos [facility]

## 2022-02-23 PROCEDURE — 99232 SBSQ HOSP IP/OBS MODERATE 35: CPT | Performed by: PSYCHIATRY & NEUROLOGY

## 2022-02-23 PROCEDURE — 74011250637 HC RX REV CODE- 250/637: Performed by: PSYCHIATRY & NEUROLOGY

## 2022-02-23 PROCEDURE — 74011250636 HC RX REV CODE- 250/636: Performed by: PSYCHIATRY & NEUROLOGY

## 2022-02-23 PROCEDURE — 74011250637 HC RX REV CODE- 250/637

## 2022-02-23 PROCEDURE — 65220000003 HC RM SEMIPRIVATE PSYCH

## 2022-02-23 RX ORDER — DOXEPIN HYDROCHLORIDE 10 MG/1
10 CAPSULE ORAL
Status: DISCONTINUED | OUTPATIENT
Start: 2022-02-23 | End: 2022-02-24 | Stop reason: HOSPADM

## 2022-02-23 RX ORDER — HYDROXYZINE 50 MG/1
50 TABLET, FILM COATED ORAL
Qty: 60 TABLET | Refills: 1 | Status: SHIPPED | OUTPATIENT
Start: 2022-02-23 | End: 2022-07-29

## 2022-02-23 RX ORDER — RISPERIDONE 1 MG/1
1 TABLET, FILM COATED ORAL 2 TIMES DAILY
Qty: 60 TABLET | Refills: 1 | Status: SHIPPED | OUTPATIENT
Start: 2022-02-23 | End: 2022-02-24

## 2022-02-23 RX ORDER — BUSPIRONE HYDROCHLORIDE 30 MG/1
30 TABLET ORAL 2 TIMES DAILY
Qty: 60 TABLET | Refills: 1 | Status: SHIPPED | OUTPATIENT
Start: 2022-02-23

## 2022-02-23 RX ORDER — TRAZODONE HYDROCHLORIDE 50 MG/1
50 TABLET ORAL
Qty: 30 TABLET | Refills: 1 | Status: SHIPPED | OUTPATIENT
Start: 2022-02-23 | End: 2022-07-29

## 2022-02-23 RX ORDER — RISPERIDONE 1 MG/1
1 TABLET, FILM COATED ORAL 2 TIMES DAILY
Status: DISCONTINUED | OUTPATIENT
Start: 2022-02-23 | End: 2022-02-24 | Stop reason: HOSPADM

## 2022-02-23 RX ORDER — CLONIDINE HYDROCHLORIDE 0.1 MG/1
0.1 TABLET ORAL
Qty: 90 TABLET | Refills: 1 | Status: SHIPPED | OUTPATIENT
Start: 2022-02-23

## 2022-02-23 RX ORDER — TOPIRAMATE 50 MG/1
50 TABLET, FILM COATED ORAL DAILY
Qty: 30 TABLET | Refills: 1 | Status: SHIPPED | OUTPATIENT
Start: 2022-02-24 | End: 2022-07-29

## 2022-02-23 RX ADMIN — DOXEPIN HYDROCHLORIDE 10 MG: 10 CAPSULE ORAL at 21:14

## 2022-02-23 RX ADMIN — BUSPIRONE HYDROCHLORIDE 30 MG: 10 TABLET ORAL at 16:35

## 2022-02-23 RX ADMIN — TRAZODONE HYDROCHLORIDE 50 MG: 50 TABLET ORAL at 00:49

## 2022-02-23 RX ADMIN — CLONIDINE HYDROCHLORIDE 0.1 MG: 0.1 TABLET ORAL at 15:20

## 2022-02-23 RX ADMIN — HYDROXYZINE HYDROCHLORIDE 50 MG: 25 TABLET, FILM COATED ORAL at 23:15

## 2022-02-23 RX ADMIN — HYDROXYZINE HYDROCHLORIDE 50 MG: 25 TABLET, FILM COATED ORAL at 15:20

## 2022-02-23 RX ADMIN — TOPIRAMATE 50 MG: 25 TABLET, FILM COATED ORAL at 08:01

## 2022-02-23 RX ADMIN — Medication 2 MG: at 15:20

## 2022-02-23 RX ADMIN — RISPERIDONE 1 MG: 1 TABLET ORAL at 17:35

## 2022-02-23 RX ADMIN — TRAZODONE HYDROCHLORIDE 50 MG: 50 TABLET ORAL at 23:16

## 2022-02-23 RX ADMIN — HYDROXYZINE HYDROCHLORIDE 50 MG: 25 TABLET, FILM COATED ORAL at 00:49

## 2022-02-23 RX ADMIN — BUSPIRONE HYDROCHLORIDE 30 MG: 10 TABLET ORAL at 08:01

## 2022-02-23 RX ADMIN — RISPERIDONE 1 MG: 1 TABLET ORAL at 08:01

## 2022-02-23 RX ADMIN — RISPERIDONE 25 MG: KIT at 17:19

## 2022-02-23 NOTE — BH NOTES
The documentation for this period is being entered following the guidelines as defined in the [facility] downtime policy by Maribel Morgan RN.      [id number] Patient is sleeping in his room. No violence recorded. Quick 15 minutes checks ongoing for safety.

## 2022-02-23 NOTE — BH NOTES
Psychiatric Progress Note    Patient: Hemalatha Covington MRN: [id number]  SSN: [id number]    YOB: 1990  Age: 27 y.o. Sex: male      Admit Date: 2/18/2022    LOS: 4 days     Subjective:     Hemalatha Covington  reports feeling less anxious and moods are fair. Discussed medications and BOWEN option after his provider Dr. Jordy Garcia called in giving further history: ETOH abuse, opiate abuse, non-compliance. Denies SI/HI/AH/VH. No aggression or violence. Appropriately interactive and aware. Tolerating medications well. Eating well and sleeping better. Willing to get on [place] family of medications. 02/21 - no acute overnight events. Patient slept 11 hours overnight. He endorses anxiety due primarily to his roommate making bizarre comments throughout the day. Patient denies SI/HI/AVH, he is medication compliant and has been otherwise in fair behavioral control. Patient requests transfer to quieter side of the unit, order placed. He is able to make his needs known and vitals have been unremarkable. 02/22 - the patient has been visible, able to make his needs known and with no episodes of SI/HI/ANMOL/PI. Patient states he is speaking with his family and is allowed to return home when he discharges. Patient otherwise in fair behavioral control, medication compliant and got no PRNs for agitation or aggression. Patient volunteered from court. 02/23 - no acute overnight events. Patient has been visible, pleasant and cooperative, and medication compliant. He is discharge focused and states he is able to return home when his court ordered observation has completed. Patient slept 3 hours overnight stating that he was anxious and is hopeful that his sleep improves tonight. Per , patient's family is not accepting him home yet because they feel he has ongoing behavioral issues. Family requested and patient agreed to do a long acting Risperdal injection.  He is otherwise in fair behavioral control, no PRNs given for agitation. Patient denies SI/HI/AVH/PI, he became labile after speaking with his mother but was verbally redirectable.     Objective:     Vitals:    02/22/22 0816 02/22/22 1947 02/23/22 0740 02/23/22 1520   BP: (!) 143/77 112/62 100/70 130/89   Pulse: 74 72 70 84   Resp: 14 16 18    Temp: 98.6 °F (37 °C) 98.1 °F (36.7 °C) 97.7 °F (36.5 °C)    SpO2: 100% 100% 100%    Weight:       Height:            Mental Status Exam:   Sensorium  oriented to time, place and person   Relations cooperative   Eye Contact appropriate   Appearance:  age appropriate   Speech:  normal volume and non-pressured   Thought Process: goal directed   Thought Content free of delusions and free of hallucinations   Suicidal ideations none   Mood:  euthymic   Affect:  Fair range   Memory   adequate   Concentration:  adequate   Insight:  fair   Judgment:  fair and limited       MEDICATIONS:  Current Facility-Administered Medications   Medication Dose Route Frequency    risperiDONE microspheres (RisperDAL consta) injection 25 mg  25 mg IntraMUSCular EVERY 2 WEEKS    risperiDONE (RisperDAL) tablet 1 mg  1 mg Oral BID    nicotine buccal (POLACRILEX) lozenge 2 mg  2 mg Oral Q2H PRN    nicotine (NICODERM CQ) 21 mg/24 hr patch 1 Patch  1 Patch TransDERmal DAILY    OLANZapine (ZyPREXA) tablet 5 mg  5 mg Oral Q6H PRN    haloperidol lactate (HALDOL) injection 5 mg  5 mg IntraMUSCular Q6H PRN    benztropine (COGENTIN) tablet 1 mg  1 mg Oral BID PRN    diphenhydrAMINE (BENADRYL) injection 50 mg  50 mg IntraMUSCular BID PRN    hydrOXYzine HCL (ATARAX) tablet 50 mg  50 mg Oral TID PRN    LORazepam (ATIVAN) injection 1 mg  1 mg IntraMUSCular Q4H PRN    traZODone (DESYREL) tablet 50 mg  50 mg Oral QHS PRN    acetaminophen (TYLENOL) tablet 650 mg  650 mg Oral Q4H PRN    magnesium hydroxide (MILK OF MAGNESIA) 400 mg/5 mL oral suspension 30 mL  30 mL Oral DAILY PRN    busPIRone (BUSPAR) tablet 30 mg  30 mg Oral BID    cloNIDine HCL (CATAPRES) tablet 0.1 mg  0.1 mg Oral TID PRN    topiramate (TOPAMAX) tablet 50 mg  50 mg Oral DAILY      DISCUSSION:   the risks and benefits of the proposed medication  patient given opportunity to ask questions    Lab/Data Review: All lab results for the last 24 hours reviewed. No results found for this or any previous visit (from the past 24 hour(s)). Assessment:     Principal Problem:    Bipolar disorder, current episode manic severe with psychotic features ([facility] [address] (2/19/2022)        Plan:     Continue current care  Collateral information  Risperdal 1 mg PO BID for psychosis  - START Risperdal CONSTA 25 mg Q2Wks for bipolar maintenance  - RESTART Clonidine 0.1 mg TID for restlessness  - START Doxepin 10 mg QHS for insomnia  Disposition planning with social work    I certify that this patient's inpatient psychiatric hospital services furnished since the previous certification were, and continue to be, required for treatment that could reasonably be expected to improve the patient's condition, or for diagnostic study, and that the patient continues to need, on a daily basis, active treatment furnished directly by or requiring the supervision of inpatient psychiatric facility personnel. In addition, the hospital records show that services furnished were intensive treatment services, admission or related services, or equivalent services.   Signed By: David Geiger MD     February 23, 2022

## 2022-02-23 NOTE — GROUP NOTE
DEVANTE  GROUP DOCUMENTATION INDIVIDUAL                                                                          Group Therapy Note    Date: 2/23/2022    Group Start Time: 0900  Group End Time: 1000  Group Topic: Topic Group    [facility] 3 ACUTE BEHAV [facility]    [address] GROUP    Group Therapy Note    Attendees: 4         Attendance: Did not attend    Patient's Goal:      Interventions/techniques:  Lenora Luong

## 2022-02-23 NOTE — PROGRESS NOTES
Problem: Falls - Risk of  Goal: *Absence of Falls  Description: Document Garcia Blades Fall Risk and appropriate interventions in the flowsheet.   Outcome: Progressing Towards Goal  Note: Fall Risk Interventions:            Medication Interventions: Teach patient to arise slowly                   Problem: Altered Thought Process (Adult/Pediatric)  Goal: *STG: Remains safe in hospital  Outcome: Progressing Towards Goal

## 2022-02-23 NOTE — PROGRESS NOTES
Problem: Falls - Risk of  Goal: *Absence of Falls  Description: Document Darlen [place] Fall Risk and appropriate interventions in the flowsheet. Outcome: Progressing Towards Goal  Note: Fall Risk Interventions:     Medication Interventions: Teach patient to arise slowly    Problem: Altered Thought Process (Adult/Pediatric)  Goal: *STG: Participates in treatment plan  Outcome: Progressing Towards Goal  Goal: *STG: Remains safe in hospital  Outcome: Progressing Towards Goal  Goal: *STG: Seeks staff when feelings of anxiety and fear arise  Outcome: Progressing Towards Goal     [id number] Shift report received from Eliazar Barros, [address] Patient met resting in his room, he was calm and cooperative. He denies SI/HI/VAH, denies anxiety and Depression. Patient is medication and meal compliant, prn Tylenol given for back pain with good effect. No violence or aggression recorded. Q15 minutes checks ongoing for safety. 0400- 0600 Patient is sleeping. Q15 minutes checks is ongoing for safety      At 0600, he slept a total of 3.25 hours.

## 2022-02-23 NOTE — GROUP NOTE
DEVANTE  GROUP DOCUMENTATION INDIVIDUAL                                                                          Group Therapy Note    Date: 2/23/2022    Group Start Time: 1100  Group End Time: 1200  Group Topic: Topic Group    [address] 3 ACUTE BEHAV [place] [address] GROUP    Group Therapy Note    Attendees: 4         Attendance: Did not attend    Patient's Goal:      Interventions/techniques:  Trenton Jackson

## 2022-02-23 NOTE — GROUP NOTE
DEVANTE  GROUP DOCUMENTATION INDIVIDUAL                                                                          Group Therapy Note    Date: 2/23/2022    Group Start Time: 1500  Group End Time: 1600  Group Topic: Recreational/Music Therapy    [facility] 3 ACUTE BEHAV [place] [address] GROUP    Group Therapy Note    Attendees: 7         Attendance: Did not attend    Patient's Goal:      Interventions/techniques:Gabriela Cornejo

## 2022-02-23 NOTE — PROGRESS NOTES
Spiritual Care Assessment/Progress Note  [facility]      NAME: Dani Garcia      MRN: [id number]  AGE: 27 y.o. SEX: male  Mandaen Affiliation: Confucianist   Language: English     2/23/2022     Total Time (in minutes): 5     Spiritual Assessment begun in [place] [address] [address] through conversation with:         []Patient        [] Family    [] Friend(s)        Reason for Consult: Initial/Spiritual assessment, patient floor     Spiritual beliefs: (Please include comment if needed)     [] Identifies with a courtney tradition:         [] Supported by a courtney community:            [] Claims no spiritual orientation:           [] Seeking spiritual identity:                [] Adheres to an individual form of spirituality:           [x] Not able to assess:                           Identified resources for coping:      [] Prayer                               [] Music                  [] Guided Imagery     [] Family/friends                 [] Pet visits     [] Devotional reading                         [x] Unknown     [] Other:                                              Interventions offered during this visit: (See comments for more details)                Plan of Care:     [] Support spiritual and/or cultural needs    [] Support AMD and/or advance care planning process      [] Support grieving process   [] Coordinate Rites and/or Rituals    [] Coordination with community clergy   [] No spiritual needs identified at this time   [] Detailed Plan of Care below (See Comments)  [] Make referral to Music Therapy  [] Make referral to Pet Therapy     [] Make referral to Addiction services  [] Make referral to [facility]  [] Make referral to Spiritual Care Partner  [] No future visits requested        [x] Contact Spiritual Care for further referrals     Comments:  visit for initial spiritual assessment. Patient asleep at the time of this visit.   Please contact spiritual care for further referral or consult. Rev.  Yulissa Nunes MDiv, [facility], [facility] paging service: [phone])

## 2022-02-23 NOTE — BH NOTES
Patient denies SI/HI/AVH. Patient calm and cooperative with assessment. Reported not sleeping well lastnight. Patient is med compliant. No voiced concerns. Nursing rounds completed no issues to note at this time. Will continue to monitor.

## 2022-02-24 VITALS
HEIGHT: 70 IN | TEMPERATURE: 97.8 F | SYSTOLIC BLOOD PRESSURE: 128 MMHG | RESPIRATION RATE: 18 BRPM | WEIGHT: 193 LBS | OXYGEN SATURATION: 98 % | DIASTOLIC BLOOD PRESSURE: 81 MMHG | BODY MASS INDEX: 27.63 KG/M2 | HEART RATE: 61 BPM

## 2022-02-24 PROCEDURE — 74011250637 HC RX REV CODE- 250/637: Performed by: PSYCHIATRY & NEUROLOGY

## 2022-02-24 RX ORDER — DOXEPIN HYDROCHLORIDE 10 MG/1
10 CAPSULE ORAL
Qty: 30 CAPSULE | Refills: 1 | Status: SHIPPED | OUTPATIENT
Start: 2022-02-24 | End: 2022-07-29

## 2022-02-24 RX ORDER — RISPERIDONE 1 MG/1
1 TABLET, FILM COATED ORAL 2 TIMES DAILY
Qty: 40 TABLET | Refills: 0 | Status: SHIPPED | OUTPATIENT
Start: 2022-02-24 | End: 2022-03-16

## 2022-02-24 RX ADMIN — RISPERIDONE 1 MG: 1 TABLET ORAL at 08:31

## 2022-02-24 RX ADMIN — BUSPIRONE HYDROCHLORIDE 30 MG: 10 TABLET ORAL at 08:31

## 2022-02-24 RX ADMIN — TOPIRAMATE 50 MG: 25 TABLET, FILM COATED ORAL at 08:31

## 2022-02-24 NOTE — PROGRESS NOTES
Braden Hsu denied SI, HI, AVH and pain. He was med compliant and had a snack. He c/o anxiety and insomnia and was given Atarax and Trazodone and appears to be sleeping well.

## 2022-02-24 NOTE — GROUP NOTE
DEVANTE  GROUP DOCUMENTATION INDIVIDUAL                                                                          Group Therapy Note    Date: 2/24/2022    Group Start Time: 0900  Group End Time: 1000  Group Topic: Topic Group    [facility] 3 ACUTE BEHAV [facility]    [address] Faust [address] GROUP    Group Therapy Note    Attendees: 4         Attendance: Did not attend    Patient's Goal:      Interventions/techniques  [facility]

## 2022-02-24 NOTE — PROGRESS NOTES
Problem: Falls - Risk of  Goal: *Absence of Falls  Description: Document Melisa Robin Fall Risk and appropriate interventions in the flowsheet.   Outcome: Progressing Towards Goal  Note: Fall Risk Interventions:            Medication Interventions: Teach patient to arise slowly                   Problem: Patient Education: Go to Patient Education Activity  Goal: Patient/Family Education  Outcome: Progressing Towards Goal     Problem: Altered Thought Process (Adult/Pediatric)  Goal: *STG: Participates in treatment plan  Outcome: Progressing Towards Goal  Goal: *STG: Remains safe in hospital  Outcome: Progressing Towards Goal  Goal: *STG: Complies with medication therapy  Outcome: Progressing Towards Goal

## 2022-02-24 NOTE — PROGRESS NOTES
Problem: Altered Thought Process (Adult/Pediatric)  Goal: *STG: Participates in treatment plan  Outcome: Progressing Towards Goal  Goal: *STG: Remains safe in hospital  Outcome: Progressing Towards Goal  Goal: *STG: Seeks staff when feelings of anxiety and fear arise  Outcome: Progressing Towards Goal  Goal: *STG: Complies with medication therapy  Outcome: Progressing Towards Goal     Problem: Discharge Planning  Goal: *Discharge to safe environment  Outcome: Progressing Towards Goal  Goal: *Knowledge of medication management  Outcome: Progressing Towards Goal  Goal: *Knowledge of discharge instructions  Outcome: Progressing Towards Goal

## 2022-02-24 NOTE — BH NOTES
Pt is alert and oriented x person, place and time. Pt denies any SI/HI or AV hallucinations. Pt denies any depression. Pt plans to return home. Discharge information reviewed with patient. Pt verbalizes understanding. Pt's belongings/valuables returned. Pt to be transported home by lyft.

## 2022-02-24 NOTE — GROUP NOTE
DEVANTE  GROUP DOCUMENTATION INDIVIDUAL                                                                          Group Therapy Note    Date: 2/24/2022    Group Start Time: 1100  Group End Time: 1200  Group Topic: Topic Group    [facility] [place] 3 ACUTE BEHAV [facility]    [place] Faust [facility] GROUP    Group Therapy Note    Attendees: 4         Attendance: Did not attend    Patient's Goal:      Interventions/techniques  Lulu Johansen

## 2022-02-24 NOTE — DISCHARGE INSTRUCTIONS
Patient Education        Bipolar Disorder: Care Instructions  Your Care Instructions     Bipolar disorder is an illness that causes extreme mood changes, from times of very high energy (manic episodes) to times of depression. But many people with bipolar disorder show only the symptoms of depression. These moods may cause problems with your work, school, family life, friendships, and how well you function. This disease is also called manic-depression. There is no cure for bipolar disorder, but it can be helped with medicines. Counseling may also help. It is important to take your medicines exactly as prescribed, even when you feel well. You may need lifelong treatment. Follow-up care is a key part of your treatment and safety. Be sure to make and go to all appointments, and call your doctor if you are having problems. It's also a good idea to know your test results and keep a list of the medicines you take. How can you care for yourself at home? · Be safe with medicines. Take your medicines exactly as prescribed. Do not stop or change a medicine without talking to your doctor first. Nickolas Long and your doctor may need to try different combinations of medicines to find what works for you. · Take your medicines on schedule to keep your moods even. When you feel good, you may think that you do not need your medicines. But it is important to keep taking them. · Go to your counseling sessions. Call and talk with your counselor if you can't go to a session or if you don't think the sessions are helping. Do not just stop going. · Get at least 30 minutes of activity on most days of the week. Walking is a good choice. You also may want to do other things, such as running, swimming, or cycling. · Get enough sleep. Keep your room dark and quiet. Try to go to bed at the same time every night. · Eat a healthy diet. This includes whole grains, dairy, fruits, vegetables, and protein.  Eat foods from each of these groups. · Try to lower your stress. Manage your time, build a strong support system, and lead a healthy lifestyle. To lower your stress, try physical activity, slow deep breathing, or getting a massage. · Do not use alcohol, marijuana, or illegal drugs. · Learn the early signs of your mood changes. You can then take steps to help yourself feel better. · Ask for help from friends and family when you need it. You may need help with daily chores when you are depressed. When you are manic, you may need support to control your high energy levels. What should you do if someone in your family has bipolar disorder? · Learn about the disease and signs it's getting worse. · Remind your family member you love them. · Make a plan with all family members about how to take care of your loved one when symptoms are bad. · Remind yourself it will take time for changes to occur. · Try not to blame yourself for the disease. · Know your legal rights and the legal rights of your family member. Support groups or counselors can help with this information. · Take care of yourself. Keep up with your interests, such as career, hobbies, and friends. Use exercise, positive self-talk, deep breathing, and other relaxing exercises to help lower your stress. · Give yourself time to grieve. You may need to deal with emotions such as anger, fear, and frustration. · If you are having a hard time with your feelings or with your relationship with your family member, talk with a counselor. When should you call for help? Call 911 anytime you think you may need emergency care. For example, call if:    · You feel like hurting yourself or someone else.     · Someone who has bipolar disorder displays dangerous behavior, and you think the person might hurt himself or herself or someone else. Call your doctor now or seek immediate medical care if:    · You hear voices.     · Someone you know has bipolar disorder and talks about suicide.  Keep the numbers for these national suicide hotlines: 3-871-987-TALK (6-494.869.7092) and 3-234-EIZVUPK (3-765.207.6609). If a suicide threat seems real, with a specific plan and a way to carry it out, stay with the person, or ask someone you trust to stay with the person, until you can get help.     · Someone you know has bipolar disorder and:  ? Starts to give away possessions. ? Is using illegal drugs or drinking alcohol heavily. ? Talks or writes about death, including writing suicide notes or talking about guns, knives, or pills. ? Talks or writes about hurting someone else. ? Starts to spend a lot of time alone. ? Acts very aggressively or suddenly appears calm. ? Talks about beliefs that are not based in reality (delusions). Watch closely for changes in your health, and be sure to contact your doctor if:    · You cannot go to your counseling sessions. Where can you learn more? Go to http://www.vance.com/  Enter K052 in the search box to learn more about \"Bipolar Disorder: Care Instructions. \"  Current as of: 2021               Content Version: 13.0  © 5649-6435 [employer]. Care instructions adapted under license by [employer] (which disclaims liability or warranty for this information). If you have questions about a medical condition or this instruction, always ask your healthcare professional. [place] [address] any warranty or liability for your use of this information. DISCHARGE SUMMARY    NAME:Doni Durán  : 1990  MRN: [id number]    The patient Brissa Manning exhibits the ability to control behavior in a less restrictive environment. Patient's level of functioning is improving. No assaultive/destructive behavior has been observed for the past 24 hours. No suicidal/homicidal threat or behavior has been observed for the past 24 hours. There is no evidence of serious medication side effects.   Patient has not been in physical or protective restraints for at least the past 24 hours. If weapons involved, how are they secured? ***    Is patient aware of and in agreement with discharge plan? ***    Arrangements for medication:  Prescriptions given to patient, given a weeks supply or 30 day supply. Copy of discharge instructions to provider?:  ***    Arrangements for transportation home:  ***    Keep all follow up appointments as scheduled, continue to take prescribed medications per physician instructions.   Mental health crisis number:  458 or your local mental health crisis line number at [facility] at [address] [place] [address] Emergency WARM LINE      5-076-185-MHAV ([phone])      M-F: 9am to 9pm      Sat & Sun: [facility] suicide prevention lines:                             7-745-IZXMRUE (9-357-272-154-671-9385)       2-423-178-TALK (1-826-456-588-376-4161)   24/7 Crisis Text Line:  Text HOME to [phone]

## 2022-02-25 NOTE — DISCHARGE SUMMARY
PSYCHIATRIC DISCHARGE SUMMARY         IDENTIFICATION:    Patient Name  Pura Lora   Date of Birth 1990   [facility] [id number]   Medical Record Number  [id number]      Age  27 y.o. PCP None   Admit date:  2/18/2022    Discharge date: 2/24/2022   Room Number  329/01  @ [facility]   Date of Service  2/24/2022            TYPE OF DISCHARGE: RELEASED BY THE [facility]DO COURT               CONDITION AT DISCHARGE: improved and fair       PROVISIONAL & DISCHARGE DIAGNOSES:    Problem List  Never Reviewed          Codes Class    * (Principal) Bipolar disorder, current episode manic severe with psychotic features ([facility] ICD-10-CM: F31.2  ICD-9-CM: 296.44         Adjustment disorder with disturbance of conduct ICD-10-CM: F43.24  ICD-9-CM: 309.3               Active Hospital Problems    *Bipolar disorder, current episode manic severe with psychotic features ([facility]        DISCHARGE DIAGNOSIS:   Axis I:  SEE ABOVE  Axis II: SEE ABOVE  Axis III: SEE ABOVE  Axis IV:  lack of structure  Axis V:  30 on admission, 70 on discharge         CC & HISTORY OF PRESENT ILLNESS:  \"psychosis\"    This is a 25-year-old  male with history of bipolar disorder. He lives with his family. He reports that, he has been in there at home with his family for all his life. He wants to have his own place. He is attempting to leave the home to find his own place to stay and every time he tries to get away from the neighborhood, he finds himself back there. He is distressed about this and upset. He apparently has been off his medications for a bit of time. Unclear exactly how long, he has not been stating. He was raised in this neighborhood and stated he has just not been able to raise enough money to leave his home and he has been stuck there. Loose presentation and slightly disorganized, but responds appropriately to questions.   By the ED report, he is brought in under [facility], advised by mother because he was acting jennifer.    02/21 - no acute overnight events. Patient slept 11 hours overnight. He endorses anxiety due primarily to his roommate making bizarre comments throughout the day. Patient denies SI/HI/AVH, he is medication compliant and has been otherwise in fair behavioral control. Patient requests transfer to quieter side of the unit, order placed. He is able to make his needs known and vitals have been unremarkable.     02/22 - the patient has been visible, able to make his needs known and with no episodes of SI/HI/ANMOL/PI. Patient states he is speaking with his family and is allowed to return home when he discharges. Patient otherwise in fair behavioral control, medication compliant and got no PRNs for agitation or aggression. Patient volunteered from court.     02/23 - no acute overnight events. Patient has been visible, pleasant and cooperative, and medication compliant. He is discharge focused and states he is able to return home when his court ordered observation has completed. Patient slept 3 hours overnight stating that he was anxious and is hopeful that his sleep improves tonight. Per SW, patient's family is not accepting him home yet because they feel he has ongoing behavioral issues. Family requested and patient agreed to do a long acting Risperdal injection. He is otherwise in fair behavioral control, no PRNs given for agitation. Patient denies SI/HI/AVH/PI, he became labile after speaking with his mother but was verbally redirectable.          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    Drug use: Yes     Types: Marijuana, Heroin, Cocaine     Comment: pt states last use 1-1.5 yrs    Sexual activity: Not Currently   Other Topics Concern    Not on file   Social History Narrative    ** Merged History Encounter **          Social Determinants of Health     Financial Resource Strain:     Difficulty of Paying Living Expenses: Not on file   Food Insecurity:     Worried About Running Out of Food in the Last Year: Not on file    Edgard of Food in the Last Year: Not on file   Transportation Needs:     Lack of Transportation (Medical): Not on file    Lack of Transportation (Non-Medical): Not on file   Physical Activity:     Days of Exercise per Week: Not on file    Minutes of Exercise per Session: Not on file   Stress:     Feeling of Stress : Not on file   Social Connections:     Frequency of Communication with Friends and Family: Not on file    Frequency of Social Gatherings with Friends and Family: Not on file    Attends Restorationist Services: Not on file    Active Member of [address] [place] [address] or Organizations: Not on file    Attends Club or Organization Meetings: Not on file    Marital Status: Not on file   Intimate Partner Violence:     Fear of Current or Ex-Partner: Not on file    Emotionally Abused: Not on file    Physically Abused: Not on file    Sexually Abused: Not on file   Housing Stability:     Unable to Pay for Housing in the Last Year: Not on file    Number of [place] in the Last Year: Not on file    Unstable Housing in the Last Year: Not on file      FAMILY HISTORY:   History reviewed. No pertinent family history. HOSPITALIZATION COURSE:    Meghan Matos was admitted to the inpatient psychiatric unit [facility] for acute psychiatric stabilization in regards to symptomatology as described in the HPI above. The differential diagnosis at time of admission included: schizoaffective vs bipolar disorder. While on the unit Luana Rojas was involved in individual, group, occupational and milieu therapy. Psychiatric medications were adjusted during this hospitalization including Risperdal (given as Consta prior to discharge). Luana Rojas demonstrated a slow, but progressive improvement in overall condition. Much of patient's initial presentation appeared to be related to situational stressors, effects of medication non-compliance and psychological factors. Please see individual progress notes for more specific details regarding patient's hospitalization course. At time of discharge, Luana Rojas is without significant problems of depression, psychosis, or doni. Patient free of suicidal and homicidal ideations (appears to be at very low risk of suicide or homicide) and reports many positive predictive factors in terms of not attempting suicide or homicide. Overall presentation at time of discharge is most consistent with the diagnosis of bipolar disorder, manic severe. Patient has maximized benefit to be derived from acute inpatient psychiatric treatment. All members of the treatment team concur with each other in regards to plans for discharge today. Patient and family are aware and in agreement with discharge and discharge plan. LABS AND IMAGAING:    Labs Reviewed   CBC WITH AUTOMATED DIFF - Abnormal; Notable for the following components:       Result Value    WBC 12.2 (*)     NEUTROPHILS 77 (*)     ABS. NEUTROPHILS 9.3 (*)     ABS. IMM.  GRANS. 0.1 (*)     All other components within normal limits   METABOLIC PANEL, COMPREHENSIVE - Abnormal; Notable for the following components:    Anion gap 18 (*)     Protein, total 8.5 (*)     Albumin 5.3 (*)     All other components within normal limits   DRUG SCREEN, URINE - Abnormal; Notable for the following components:    THC (TH-CANNABINOL) Positive (*)     All other components within normal limits   LIPID PANEL - Abnormal; Notable for the following components:    LDL, calculated 104.4 (*)     All other components within normal limits   HEMOGLOBIN A1C WITH EAG - Abnormal; Notable for the following components:    Hemoglobin A1c 5.7 (*)     All other components within normal limits   COVID-19 WITH INFLUENZA A/B   ETHYL ALCOHOL   TSH 3RD GENERATION     No results found for: DS35, PHEN, PHENO, PHENT, DILF, DS39, PHENY, PTN, VALF2, VALAC, VALP, VALPR, DS6, CRBAM, CRBAMP, CARB2, XCRBAM  Admission on 02/18/2022, Discharged on 02/24/2022   Component Date Value Ref Range Status    WBC 02/18/2022 12.2* 4.1 - 11.1 K/uL Final    RBC 02/18/2022 5.37  4.10 - 5.70 M/uL Final    HGB 02/18/2022 14.8  12.1 - 17.0 g/dL Final    HCT 02/18/2022 45.2  36.6 - 50.3 % Final    MCV 02/18/2022 84.2  80.0 - 99.0 FL Final    MCH 02/18/2022 27.6  26.0 - 34.0 PG Final    MCHC 02/18/2022 32.7  30.0 - 36.5 g/dL Final    RDW 02/18/2022 13.2  11.5 - 14.5 % Final    PLATELET 51/72/3213 964  150 - 400 K/uL Final    MPV 02/18/2022 9.4  8.9 - 12.9 FL Final    NRBC 02/18/2022 0.0  0  WBC Final    ABSOLUTE NRBC 02/18/2022 0.00  0.00 - 0.01 K/uL Final    NEUTROPHILS 02/18/2022 77* 32 - 75 % Final    LYMPHOCYTES 02/18/2022 17  12 - 49 % Final    MONOCYTES 02/18/2022 6  5 - 13 % Final    EOSINOPHILS 02/18/2022 0  0 - 7 % Final    BASOPHILS 02/18/2022 0  0 - 1 % Final    IMMATURE GRANULOCYTES 02/18/2022 0  0.0 - 0.5 % Final    ABS. NEUTROPHILS 02/18/2022 9.3* 1.8 - 8.0 K/UL Final    ABS. LYMPHOCYTES 02/18/2022 2.1  0.8 - 3.5 K/UL Final    ABS. MONOCYTES 02/18/2022 0.7  0.0 - 1.0 K/UL Final    ABS. EOSINOPHILS 02/18/2022 0.0  0.0 - 0.4 K/UL Final    ABS. BASOPHILS 02/18/2022 0.1  0.0 - 0.1 K/UL Final    ABS. IMM.  GRANS. 02/18/2022 0.1* 0.00 - 0.04 K/UL Final    DF 02/18/2022 AUTOMATED    Final    Sodium 02/18/2022 144  136 - 145 mmol/L Final    Potassium 02/18/2022 3.7  3.5 - 5.1 mmol/L Final    Chloride 02/18/2022 105  97 - 108 mmol/L Final    CO2 02/18/2022 21  21 - 32 mmol/L Final    Anion gap 02/18/2022 18* 5 - 15 mmol/L Final    Glucose 02/18/2022 91  65 - 100 mg/dL Final    BUN 02/18/2022 16  6 - 20 MG/DL Final    Creatinine 02/18/2022 1.02  0.70 - 1.30 MG/DL Final    BUN/Creatinine ratio 02/18/2022 16  12 - 20   Final    GFR est AA 02/18/2022 >60  >60 ml/min/1.73m2 Final    GFR est non-AA 02/18/2022 >60  >60 ml/min/1.73m2 Final    Calcium 02/18/2022 9.1  8.5 - 10.1 MG/DL Final    Bilirubin, total 02/18/2022 0.7  0.2 - 1.0 MG/DL Final    ALT (SGPT) 02/18/2022 17  12 - 78 U/L Final    AST (SGOT) 02/18/2022 16  15 - 37 U/L Final    Alk.  phosphatase 02/18/2022 85  45 - 117 U/L Final    Protein, total 02/18/2022 8.5* 6.4 - 8.2 g/dL Final    Albumin 02/18/2022 5.3* 3.5 - 5.0 g/dL Final    Globulin 02/18/2022 3.2  2.0 - 4.0 g/dL Final    A-G Ratio 02/18/2022 1.7  1.1 - 2.2   Final    ALCOHOL(ETHYL),SERUM 02/18/2022 <10  <10 MG/DL Final    AMPHETAMINES 02/18/2022 Negative  NEG   Final    BARBITURATES 02/18/2022 Negative  NEG   Final    BENZODIAZEPINES 02/18/2022 Negative  NEG   Final    COCAINE 02/18/2022 Negative  NEG   Final    METHADONE 02/18/2022 Negative  NEG   Final    OPIATES 02/18/2022 Negative  NEG   Final    PCP(PHENCYCLIDINE) 02/18/2022 Negative  NEG   Final    THC (TH-CANNABINOL) 02/18/2022 Positive* NEG   Final    Drug screen comment 02/18/2022 (NOTE)   Final    SARS-CoV-2 02/18/2022 Not detected  NOTD   Final    Influenza A by PCR 02/18/2022 Not detected    Final    Influenza B by PCR 02/18/2022 Not detected    Final    Cholesterol, total 02/22/2022 164  <200 MG/DL Final    Triglyceride 02/22/2022 108  <150 MG/DL Final    HDL Cholesterol 02/22/2022 38  MG/DL Final    LDL, calculated 02/22/2022 104.4* 0 - 100 MG/DL Final    VLDL, calculated 02/22/2022 21.6  MG/DL Final    CHOL/HDL Ratio 02/22/2022 4.3  0.0 - 5.0   Final    Hemoglobin A1c 02/22/2022 5.7* 4.0 - 5.6 % Final    Est. average glucose 02/22/2022 117  mg/dL Final    TSH 02/22/2022 1.81  0.36 - 3.74 uIU/mL Final     No results found. DISPOSITION:    Home. Patient to f/u with psychiatric and psychotherapy appointments. Patient is to f/u with internist as directed. FOLLOW-UP CARE:    Activity as tolerated  Regular diet  Wound Care: none needed. Follow-up Information     [facility]ties [facility]  Go on 2/28/2022 Medication management appointment on Monday, February 28th at 10:30AM with Dr. Eliza Agrawal in person. Please check in with your  Teofilo Nolasco upon discharge. [address] Final  [phone]  Fax: [phone]  Crisis Line: [phone]    Bi-weekly injection  On 3/9/2022 A bi-weekly injection called Risperdal Consta was started during your hospitalization. Risperdal Consta 25 mg was given on 2/23/22. Next injection is due on 3/9/22. Please continue risperidone tablets until 3/16/22. PROGNOSIS:   Fair ---- based on nature of patient's pathology/ies and treatment compliance issues. Prognosis is greatly dependent upon patient's ability to remain sober and to follow up with scheduled appointments as well as to comply with psychiatric medications as prescribed. DISCHARGE MEDICATIONS:     Informed consent given for the use of following psychotropic medications:  Discharge Medication List as of 2/24/2022  2:28 PM      START taking these medications    Details   doxepin (SINEquan) 10 mg capsule Take 1 Capsule by mouth nightly. Indications: insomnia, Normal, Disp-30 Capsule, R-1      risperiDONE microspheres (RisperDAL consta) 25 mg/2 mL injection 2 mL by IntraMUSCular route Once every 2 weeks.  Administer on March 9, 2022  Indications: bipolar disorder, Normal, Disp-1 Each, R-0         CONTINUE these medications which have CHANGED    Details   risperiDONE (RisperDAL) 1 mg tablet Take 1 Tablet by mouth two (2) times a day for 40 doses. Indications: doni associated with bipolar disorder, Normal, Disp-40 Tablet, R-0      busPIRone (BUSPAR) 30 mg tablet Take 1 Tablet by mouth two (2) times a day. Indications: repeated episodes of anxiety, Normal, Disp-60 Tablet, R-1      traZODone (DESYREL) 50 mg tablet Take 1 Tablet by mouth nightly as needed (For insomnia). Indications: insomnia associated with depression, Normal, Disp-30 Tablet, R-1      cloNIDine HCL (CATAPRES) 0.1 mg tablet Take 1 Tablet by mouth three (3) times daily as needed for Restlessness. Indications: attention deficit disorder with hyperactivity, Normal, Disp-90 Tablet, R-1      hydrOXYzine HCL (ATARAX) 50 mg tablet Take 1 Tablet by mouth two (2) times daily as needed for Anxiety. Indications: anxious, Normal, Disp-60 Tablet, R-1      topiramate (TOPAMAX) 50 mg tablet Take 1 Tablet by mouth daily. Indications: alcoholism, Normal, Disp-30 Tablet, R-1                    A coordinated, multidisplinary treatment team round was conducted with Rolando Hughes is done daily here at [facility]. This team consists of the nurse, psychiatric unit pharmacist,  and Haydee Yanes. I have spent greater than 35 minutes on discharge work.     Signed:  Naheed Webber MD  2/24/2022

## 2022-02-25 NOTE — BH NOTES
Behavioral Health Transition Record to Provider    Patient Name: Gino Garcia  YOB: 1990  Medical Record Number: [id number]  Date of Admission: 2/18/2022  Date of Discharge: 2/25/2022    Attending Provider: No att. providers found  Discharging Provider: Paula Vazquez MD  To contact this individual call [phone] and ask the  to page. If unavailable, ask to be transferred to [address] [place] [address] Provider on call. Madi Rivera Provider will be available on call 24/7 and during holidays. Primary Care Provider: None    No Known Allergies    Reason for Admission: SI    Admission Diagnosis: Bipolar disorder, current episode manic severe with psychotic features ([facility] [F31.2]    * No surgery found *    Results for orders placed or performed during the hospital encounter of 02/18/22   COVID-19 WITH INFLUENZA A/B   Result Value Ref Range    SARS-CoV-2 Not detected NOTD      Influenza A by PCR Not detected      Influenza B by PCR Not detected     CBC WITH AUTOMATED DIFF   Result Value Ref Range    WBC 12.2 (H) 4.1 - 11.1 K/uL    RBC 5.37 4.10 - 5.70 M/uL    HGB 14.8 12.1 - 17.0 g/dL    HCT 45.2 36.6 - 50.3 %    MCV 84.2 80.0 - 99.0 FL    MCH 27.6 26.0 - 34.0 PG    MCHC 32.7 30.0 - 36.5 g/dL    RDW 13.2 11.5 - 14.5 %    PLATELET 543 665 - 220 K/uL    MPV 9.4 8.9 - 12.9 FL    NRBC 0.0 0  WBC    ABSOLUTE NRBC 0.00 0.00 - 0.01 K/uL    NEUTROPHILS 77 (H) 32 - 75 %    LYMPHOCYTES 17 12 - 49 %    MONOCYTES 6 5 - 13 %    EOSINOPHILS 0 0 - 7 %    BASOPHILS 0 0 - 1 %    IMMATURE GRANULOCYTES 0 0.0 - 0.5 %    ABS. NEUTROPHILS 9.3 (H) 1.8 - 8.0 K/UL    ABS. LYMPHOCYTES 2.1 0.8 - 3.5 K/UL    ABS. MONOCYTES 0.7 0.0 - 1.0 K/UL    ABS. EOSINOPHILS 0.0 0.0 - 0.4 K/UL    ABS. BASOPHILS 0.1 0.0 - 0.1 K/UL    ABS. IMM.  GRANS. 0.1 (H) 0.00 - 0.04 K/UL    DF AUTOMATED     METABOLIC PANEL, COMPREHENSIVE   Result Value Ref Range    Sodium 144 136 - 145 mmol/L    Potassium 3.7 3.5 - 5.1 mmol/L Chloride 105 97 - 108 mmol/L    CO2 21 21 - 32 mmol/L    Anion gap 18 (H) 5 - 15 mmol/L    Glucose 91 65 - 100 mg/dL    BUN 16 6 - 20 MG/DL    Creatinine 1.02 0.70 - 1.30 MG/DL    BUN/Creatinine ratio 16 12 - 20      GFR est AA >60 >60 ml/min/1.73m2    GFR est non-AA >60 >60 ml/min/1.73m2    Calcium 9.1 8.5 - 10.1 MG/DL    Bilirubin, total 0.7 0.2 - 1.0 MG/DL    ALT (SGPT) 17 12 - 78 U/L    AST (SGOT) 16 15 - 37 U/L    Alk. phosphatase 85 45 - 117 U/L    Protein, total 8.5 (H) 6.4 - 8.2 g/dL    Albumin 5.3 (H) 3.5 - 5.0 g/dL    Globulin 3.2 2.0 - 4.0 g/dL    A-G Ratio 1.7 1.1 - 2.2     ETHYL ALCOHOL   Result Value Ref Range    ALCOHOL(ETHYL),SERUM <10 <10 MG/DL   DRUG SCREEN, URINE   Result Value Ref Range    AMPHETAMINES Negative NEG      BARBITURATES Negative NEG      BENZODIAZEPINES Negative NEG      COCAINE Negative NEG      METHADONE Negative NEG      OPIATES Negative NEG      PCP(PHENCYCLIDINE) Negative NEG      THC (TH-CANNABINOL) Positive (A) NEG      Drug screen comment (NOTE)    LIPID PANEL   Result Value Ref Range    Cholesterol, total 164 <200 MG/DL    Triglyceride 108 <150 MG/DL    HDL Cholesterol 38 MG/DL    LDL, calculated 104.4 (H) 0 - 100 MG/DL    VLDL, calculated 21.6 MG/DL    CHOL/HDL Ratio 4.3 0.0 - 5.0     HEMOGLOBIN A1C WITH EAG   Result Value Ref Range    Hemoglobin A1c 5.7 (H) 4.0 - 5.6 %    Est. average glucose 117 mg/dL   TSH 3RD GENERATION   Result Value Ref Range    TSH 1.81 0.36 - 3.74 uIU/mL       Immunizations administered during this encounter:   Immunization History   Administered Date(s) Administered    COVID-19, J&J, PF, 0.5 mL Dose 02/21/2022       Screening for Metabolic Disorders for Patients on Antipsychotic Medications  (Data obtained from the EMR)    Estimated Body Mass Index  Estimated body mass index is 27.69 kg/m² as calculated from the following:    Height as of this encounter: 5' 10\" (1.778 m). Weight as of this encounter: 87.5 kg (193 lb).      Vital Signs/Blood Pressure  Visit Vitals  /81 (BP 1 Location: Left upper arm, BP Patient Position: At rest)   Pulse 61   Temp 97.8 °F (36.6 °C)   Resp 18   Ht 5' 10\" (1.778 m)   Wt 87.5 kg (193 lb)   SpO2 98%   BMI 27.69 kg/m²       Blood Glucose/Hemoglobin A1c  Lab Results   Component Value Date/Time    Glucose 91 02/18/2022 04:41 PM       Lab Results   Component Value Date/Time    Hemoglobin A1c 5.7 (H) 02/22/2022 06:05 AM        Lipid Panel  Lab Results   Component Value Date/Time    Cholesterol, total 164 02/22/2022 06:05 AM    HDL Cholesterol 38 02/22/2022 06:05 AM    LDL, calculated 104.4 (H) 02/22/2022 06:05 AM    Triglyceride 108 02/22/2022 06:05 AM    CHOL/HDL Ratio 4.3 02/22/2022 06:05 AM        Discharge Diagnosis: Please refer to physician's discharge summary. Discharge Plan:   The patient Desi Warren exhibits the ability to control behavior in a less restrictive environment. Patient's level of functioning is improving. No assaultive/destructive behavior has been observed for the past 24 hours. No suicidal/homicidal threat or behavior has been observed for the past 24 hours. There is no evidence of serious medication side effects. Patient has not been in physical or protective restraints for at least the past 24 hours. Discharge Medication List and Instructions:   Discharge Medication List as of 2/24/2022  2:28 PM      START taking these medications    Details   doxepin (SINEquan) 10 mg capsule Take 1 Capsule by mouth nightly. Indications: insomnia, Normal, Disp-30 Capsule, R-1      risperiDONE microspheres (RisperDAL consta) 25 mg/2 mL injection 2 mL by IntraMUSCular route Once every 2 weeks. Administer on March 9, 2022  Indications: bipolar disorder, Normal, Disp-1 Each, R-0         CONTINUE these medications which have CHANGED    Details   risperiDONE (RisperDAL) 1 mg tablet Take 1 Tablet by mouth two (2) times a day for 40 doses.  Indications: doni associated with bipolar disorder, Normal, Disp-40 Tablet, R-0      busPIRone (BUSPAR) 30 mg tablet Take 1 Tablet by mouth two (2) times a day. Indications: repeated episodes of anxiety, Normal, Disp-60 Tablet, R-1      traZODone (DESYREL) 50 mg tablet Take 1 Tablet by mouth nightly as needed (For insomnia). Indications: insomnia associated with depression, Normal, Disp-30 Tablet, R-1      cloNIDine HCL (CATAPRES) 0.1 mg tablet Take 1 Tablet by mouth three (3) times daily as needed for Restlessness. Indications: attention deficit disorder with hyperactivity, Normal, Disp-90 Tablet, R-1      hydrOXYzine HCL (ATARAX) 50 mg tablet Take 1 Tablet by mouth two (2) times daily as needed for Anxiety. Indications: anxious, Normal, Disp-60 Tablet, R-1      topiramate (TOPAMAX) 50 mg tablet Take 1 Tablet by mouth daily. Indications: alcoholism, Normal, Disp-30 Tablet, R-1             Unresulted Labs (24h ago, onward)            None        To obtain results of studies pending at discharge, please contact N/A. Follow-up Information     Follow up With Specialties Details Why [address] [place] [address] Board  Go on 2/28/2022 Medication management appointment on Monday, February 28th at 10:30AM with Dr. Jordi Tapia in person. Please check in with your  Estephania Alamo upon discharge. [address] Final  [phone]  Fax: [phone]  Crisis Line: [phone]    Bi-weekly injection  On 3/9/2022 A bi-weekly injection called Risperdal Consta was started during your hospitalization. Risperdal Consta 25 mg was given on 2/23/22. Next injection is due on 3/9/22. Please continue risperidone tablets until 3/16/22. Advanced Directive:   Does the patient have an appointed surrogate decision maker? Unknown   Does the patient have a Medical Advance Directive? Unknown   Does the patient have a Psychiatric Advance Directive?  Unknown   If the patient does not have a surrogate or Medical Advance Directive AND Psychiatric Advance Directive, the patient was offered information on these advance directives. Unknown     Patient Instructions: Please continue all medications until otherwise directed by physician. Tobacco Cessation Discharge Plan:   Is the patient a smoker and needs referral for smoking cessation? No  Patient referred to the following for smoking cessation with an appointment? No   Patient was offered medication to assist with smoking cessation at discharge? No  Was education for smoking cessation added to the discharge instructions? No     Alcohol/Substance Abuse Discharge Plan:   Does the patient have a history of substance/alcohol abuse and requires a referral for treatment? Yes  Patient referred to the following for substance/alcohol abuse treatment with an appointment? Yes  Patient was offered medication to assist with alcohol cessation at discharge? No  Was education for substance/alcohol abuse added to discharge instructions? Yes     Patient discharged to Home; provided to the patient/caregiver either in hard copy or electronically. Continuing care paperwork was faxed to community mental health providers.

## 2022-03-05 ENCOUNTER — HOSPITAL ENCOUNTER (INPATIENT)
Age: 32
LOS: 2 days | Discharge: LEFT BEFORE DISCHARGE | DRG: 885 | End: 2022-03-07
Attending: EMERGENCY MEDICINE | Admitting: PSYCHIATRY & NEUROLOGY
Payer: MEDICARE

## 2022-03-05 DIAGNOSIS — F12.20 CANNABIS USE DISORDER, MODERATE, DEPENDENCE (HCC): ICD-10-CM

## 2022-03-05 DIAGNOSIS — F32.A DEPRESSION, UNSPECIFIED DEPRESSION TYPE: Primary | ICD-10-CM

## 2022-03-05 DIAGNOSIS — R23.8 BLISTERS OF MULTIPLE SITES: ICD-10-CM

## 2022-03-05 DIAGNOSIS — F43.24 ADJUSTMENT DISORDER WITH DISTURBANCE OF CONDUCT: ICD-10-CM

## 2022-03-05 DIAGNOSIS — F31.2 BIPOLAR DISORDER, CURRENT EPISODE MANIC SEVERE WITH PSYCHOTIC FEATURES (HCC): ICD-10-CM

## 2022-03-05 PROBLEM — F31.9 BIPOLAR 1 DISORDER (HCC): Status: ACTIVE | Noted: 2022-03-05

## 2022-03-05 LAB
ALBUMIN SERPL-MCNC: 4.6 G/DL (ref 3.5–5)
ALBUMIN/GLOB SERPL: 1.6 {RATIO} (ref 1.1–2.2)
ALP SERPL-CCNC: 99 U/L (ref 45–117)
ALT SERPL-CCNC: 32 U/L (ref 12–78)
AMPHET UR QL SCN: NEGATIVE
ANION GAP SERPL CALC-SCNC: 13 MMOL/L (ref 5–15)
APPEARANCE UR: CLEAR
AST SERPL-CCNC: 53 U/L (ref 15–37)
BARBITURATES UR QL SCN: NEGATIVE
BASOPHILS # BLD: 0.1 K/UL (ref 0–0.1)
BASOPHILS NFR BLD: 1 % (ref 0–1)
BENZODIAZ UR QL: NEGATIVE
BILIRUB SERPL-MCNC: 0.7 MG/DL (ref 0.2–1)
BILIRUB UR QL: NEGATIVE
BUN SERPL-MCNC: 11 MG/DL (ref 6–20)
BUN/CREAT SERPL: 14 (ref 12–20)
CALCIUM SERPL-MCNC: 8.8 MG/DL (ref 8.5–10.1)
CANNABINOIDS UR QL SCN: POSITIVE
CHLORIDE SERPL-SCNC: 103 MMOL/L (ref 97–108)
CO2 SERPL-SCNC: 24 MMOL/L (ref 21–32)
COCAINE UR QL SCN: NEGATIVE
COLOR UR: ABNORMAL
CREAT SERPL-MCNC: 0.8 MG/DL (ref 0.7–1.3)
DIFFERENTIAL METHOD BLD: ABNORMAL
DRUG SCRN COMMENT,DRGCM: ABNORMAL
EOSINOPHIL # BLD: 0 K/UL (ref 0–0.4)
EOSINOPHIL NFR BLD: 0 % (ref 0–7)
ERYTHROCYTE [DISTWIDTH] IN BLOOD BY AUTOMATED COUNT: 13.2 % (ref 11.5–14.5)
ETHANOL SERPL-MCNC: <10 MG/DL
FLUAV RNA SPEC QL NAA+PROBE: NOT DETECTED
FLUBV RNA SPEC QL NAA+PROBE: NOT DETECTED
GLOBULIN SER CALC-MCNC: 2.9 G/DL (ref 2–4)
GLUCOSE SERPL-MCNC: 97 MG/DL (ref 65–100)
GLUCOSE UR STRIP.AUTO-MCNC: NEGATIVE MG/DL
HCT VFR BLD AUTO: 39.6 % (ref 36.6–50.3)
HGB BLD-MCNC: 12.9 G/DL (ref 12.1–17)
HGB UR QL STRIP: NEGATIVE
IMM GRANULOCYTES # BLD AUTO: 0.1 K/UL (ref 0–0.04)
IMM GRANULOCYTES NFR BLD AUTO: 1 % (ref 0–0.5)
KETONES UR QL STRIP.AUTO: 15 MG/DL
LEUKOCYTE ESTERASE UR QL STRIP.AUTO: NEGATIVE
LYMPHOCYTES # BLD: 2.7 K/UL (ref 0.8–3.5)
LYMPHOCYTES NFR BLD: 18 % (ref 12–49)
MCH RBC QN AUTO: 27.4 PG (ref 26–34)
MCHC RBC AUTO-ENTMCNC: 32.6 G/DL (ref 30–36.5)
MCV RBC AUTO: 84.3 FL (ref 80–99)
METHADONE UR QL: NEGATIVE
MONOCYTES # BLD: 1.8 K/UL (ref 0–1)
MONOCYTES NFR BLD: 12 % (ref 5–13)
NEUTS SEG # BLD: 10.8 K/UL (ref 1.8–8)
NEUTS SEG NFR BLD: 68 % (ref 32–75)
NITRITE UR QL STRIP.AUTO: NEGATIVE
NRBC # BLD: 0 K/UL (ref 0–0.01)
NRBC BLD-RTO: 0 PER 100 WBC
OPIATES UR QL: NEGATIVE
PCP UR QL: NEGATIVE
PH UR STRIP: 6 [PH] (ref 5–8)
PLATELET # BLD AUTO: 334 K/UL (ref 150–400)
PMV BLD AUTO: 8.8 FL (ref 8.9–12.9)
POTASSIUM SERPL-SCNC: 3.7 MMOL/L (ref 3.5–5.1)
PROT SERPL-MCNC: 7.5 G/DL (ref 6.4–8.2)
PROT UR STRIP-MCNC: NEGATIVE MG/DL
RBC # BLD AUTO: 4.7 M/UL (ref 4.1–5.7)
SARS-COV-2, COV2: NOT DETECTED
SODIUM SERPL-SCNC: 140 MMOL/L (ref 136–145)
SP GR UR REFRACTOMETRY: 1.02 (ref 1–1.03)
UROBILINOGEN UR QL STRIP.AUTO: 0.2 EU/DL (ref 0.2–1)
WBC # BLD AUTO: 15.5 K/UL (ref 4.1–11.1)

## 2022-03-05 PROCEDURE — 80053 COMPREHEN METABOLIC PANEL: CPT

## 2022-03-05 PROCEDURE — 74011250637 HC RX REV CODE- 250/637: Performed by: NURSE PRACTITIONER

## 2022-03-05 PROCEDURE — 99222 1ST HOSP IP/OBS MODERATE 55: CPT | Performed by: PSYCHIATRY & NEUROLOGY

## 2022-03-05 PROCEDURE — 65220000003 HC RM SEMIPRIVATE PSYCH

## 2022-03-05 PROCEDURE — 99285 EMERGENCY DEPT VISIT HI MDM: CPT

## 2022-03-05 PROCEDURE — 80307 DRUG TEST PRSMV CHEM ANLYZR: CPT

## 2022-03-05 PROCEDURE — 74011250637 HC RX REV CODE- 250/637: Performed by: PSYCHIATRY & NEUROLOGY

## 2022-03-05 PROCEDURE — 82077 ASSAY SPEC XCP UR&BREATH IA: CPT

## 2022-03-05 PROCEDURE — 74011250637 HC RX REV CODE- 250/637: Performed by: EMERGENCY MEDICINE

## 2022-03-05 PROCEDURE — 85025 COMPLETE CBC W/AUTO DIFF WBC: CPT

## 2022-03-05 PROCEDURE — 87636 SARSCOV2 & INF A&B AMP PRB: CPT

## 2022-03-05 PROCEDURE — 81003 URINALYSIS AUTO W/O SCOPE: CPT

## 2022-03-05 PROCEDURE — 36415 COLL VENOUS BLD VENIPUNCTURE: CPT

## 2022-03-05 RX ORDER — LORAZEPAM 2 MG/ML
2 INJECTION INTRAMUSCULAR
Status: DISCONTINUED | OUTPATIENT
Start: 2022-03-05 | End: 2022-03-05

## 2022-03-05 RX ORDER — DIPHENHYDRAMINE HYDROCHLORIDE 50 MG/ML
50 INJECTION, SOLUTION INTRAMUSCULAR; INTRAVENOUS
Status: DISCONTINUED | OUTPATIENT
Start: 2022-03-05 | End: 2022-03-07 | Stop reason: HOSPADM

## 2022-03-05 RX ORDER — ADHESIVE BANDAGE
30 BANDAGE TOPICAL DAILY PRN
Status: DISCONTINUED | OUTPATIENT
Start: 2022-03-05 | End: 2022-03-07 | Stop reason: HOSPADM

## 2022-03-05 RX ORDER — HYDROXYZINE 25 MG/1
50 TABLET, FILM COATED ORAL
Status: DISCONTINUED | OUTPATIENT
Start: 2022-03-05 | End: 2022-03-07 | Stop reason: HOSPADM

## 2022-03-05 RX ORDER — HALOPERIDOL 5 MG/ML
5 INJECTION INTRAMUSCULAR
Status: DISCONTINUED | OUTPATIENT
Start: 2022-03-05 | End: 2022-03-07 | Stop reason: HOSPADM

## 2022-03-05 RX ORDER — RISPERIDONE 1 MG/1
1 TABLET, FILM COATED ORAL 2 TIMES DAILY
Status: DISCONTINUED | OUTPATIENT
Start: 2022-03-05 | End: 2022-03-07 | Stop reason: HOSPADM

## 2022-03-05 RX ORDER — TRAZODONE HYDROCHLORIDE 50 MG/1
50 TABLET ORAL
Status: DISCONTINUED | OUTPATIENT
Start: 2022-03-05 | End: 2022-03-07 | Stop reason: HOSPADM

## 2022-03-05 RX ORDER — LORAZEPAM 2 MG/ML
1 INJECTION INTRAMUSCULAR
Status: DISCONTINUED | OUTPATIENT
Start: 2022-03-05 | End: 2022-03-07 | Stop reason: HOSPADM

## 2022-03-05 RX ORDER — BUSPIRONE HYDROCHLORIDE 10 MG/1
30 TABLET ORAL 2 TIMES DAILY
Status: DISCONTINUED | OUTPATIENT
Start: 2022-03-05 | End: 2022-03-07 | Stop reason: HOSPADM

## 2022-03-05 RX ORDER — DOXEPIN HYDROCHLORIDE 10 MG/1
10 CAPSULE ORAL
Status: DISCONTINUED | OUTPATIENT
Start: 2022-03-05 | End: 2022-03-07 | Stop reason: HOSPADM

## 2022-03-05 RX ORDER — ACETAMINOPHEN 325 MG/1
650 TABLET ORAL
Status: DISCONTINUED | OUTPATIENT
Start: 2022-03-05 | End: 2022-03-07 | Stop reason: HOSPADM

## 2022-03-05 RX ORDER — OLANZAPINE 5 MG/1
5 TABLET ORAL
Status: DISCONTINUED | OUTPATIENT
Start: 2022-03-05 | End: 2022-03-07 | Stop reason: HOSPADM

## 2022-03-05 RX ORDER — BENZTROPINE MESYLATE 1 MG/1
1 TABLET ORAL
Status: DISCONTINUED | OUTPATIENT
Start: 2022-03-05 | End: 2022-03-07 | Stop reason: HOSPADM

## 2022-03-05 RX ORDER — LORAZEPAM 1 MG/1
2 TABLET ORAL
Status: COMPLETED | OUTPATIENT
Start: 2022-03-05 | End: 2022-03-05

## 2022-03-05 RX ORDER — CLONIDINE HYDROCHLORIDE 0.1 MG/1
0.1 TABLET ORAL
Status: DISCONTINUED | OUTPATIENT
Start: 2022-03-05 | End: 2022-03-07 | Stop reason: HOSPADM

## 2022-03-05 RX ORDER — TOPIRAMATE 25 MG/1
50 TABLET ORAL DAILY
Status: DISCONTINUED | OUTPATIENT
Start: 2022-03-06 | End: 2022-03-07 | Stop reason: HOSPADM

## 2022-03-05 RX ADMIN — HYDROXYZINE HYDROCHLORIDE 50 MG: 25 TABLET, FILM COATED ORAL at 20:58

## 2022-03-05 RX ADMIN — LORAZEPAM 2 MG: 1 TABLET ORAL at 01:20

## 2022-03-05 RX ADMIN — BUSPIRONE HYDROCHLORIDE 30 MG: 10 TABLET ORAL at 17:06

## 2022-03-05 RX ADMIN — TRAZODONE HYDROCHLORIDE 50 MG: 50 TABLET ORAL at 20:58

## 2022-03-05 RX ADMIN — ACETAMINOPHEN 650 MG: 325 TABLET ORAL at 20:58

## 2022-03-05 RX ADMIN — RISPERIDONE 1 MG: 1 TABLET ORAL at 17:07

## 2022-03-05 RX ADMIN — DOXEPIN HYDROCHLORIDE 10 MG: 10 CAPSULE ORAL at 21:01

## 2022-03-05 NOTE — ED NOTES
Patient cooperative with blood work at this time. Pt asking for something for anxiety. Pt visibly shaking when getting lab work done.

## 2022-03-05 NOTE — PROGRESS NOTES
Patient received resting in his room. Denies SI/HI/AVH, endorses a depression level of 8 and an anxiety level of 9. Calm, cooperative, isolative to his room throughout the shift. Taking medications as prescribed. Will continue to monitor for safety.
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
## 2022-03-05 NOTE — BH NOTES
Behavioral Health Treatment Team Note            Patient goal(s) for today: continue taking medication as prescribed, engage in unit activities, participate in hygiene/ADLs, follow directions from staff, contact support team, prepare for discharge  Treatment team focus/goals: discuss reason for admission     Progress note: Pt met with treatment team for the first time since admission. Pt states that he has not been taking his meds and that he just needs to rest to feel better. Pt reports that he has been drinking and smoking THC. Pt states he is open to the idea of a rehab program following his discharge from the hospital. Pt repeatedly states that his mother is too controlling and explains that they do not get along and this has been impacting his mental health.  Pt denies SI/HI and WOODS AT [facility].     LOS: Adm today                          Expected LOS: TBD     Financial concerns/prescription coverage: [employer] MEDICARE - [facility] COMMUNITY PLAN MCR  Date of last family contact: Sheryn Sicard, father, [phone], emergency contact                  Family requesting physician contact today: No  Discharge plan: Home  Guns in the home: No                                                     Outpatient provider(s): Hanh CALVILLO     Participating treatment team members: Meghan Matos, Sheri Polanco, Social Work Case Management and Dr. Marlene Carrel

## 2022-03-05 NOTE — BSMART NOTE
Access is aware that patient is medically clear and this writer is waiting bed placement after access consults with psychiatrist

## 2022-03-05 NOTE — ED NOTES
TRANSFER - OUT REPORT:    Verbal report given to Susan Shay RN on [place] العلي  being transferred to [facility] for routine progression of care       Report consisted of patients Situation, Background, Assessment and   Recommendations(SBAR). Information from the following report(s) SBAR and ED Summary was reviewed with the receiving nurse. Lines:       Opportunity for questions and clarification was provided.       Patient transported with:   Trace Dodson

## 2022-03-05 NOTE — ED NOTES
Emergency Department Nursing Plan of Care       The Nursing Plan of Care is developed from the Nursing assessment and Emergency Department Attending provider initial evaluation. The plan of care may be reviewed in the ED Provider note.     The Plan of Care was developed with the following considerations:   Patient / Family readiness to learn indicated by:verbalized understanding  Persons(s) to be included in education: family  Barriers to Learning/Limitations:No    Signed     Hali Hartman RN    3/5/2022   12:42 AM
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
## 2022-03-05 NOTE — PROGRESS NOTES
Problem: Depressed Mood (Adult/Pediatric)  Goal: *STG: Participates in treatment plan  Outcome: Progressing Towards Goal  Goal: *STG: Remains safe in hospital  Outcome: Progressing Towards Goal     Problem: Anxiety-Behavioral Health (Adult/Pediatric)  Goal: *STG: Seeks staff when feelings of anxiety and fear arise  Outcome: Progressing Towards Goal  Goal: *STG/LTG: Complies with medication therapy  Outcome: Progressing Towards Goal

## 2022-03-05 NOTE — PROGRESS NOTES
[facility] Admission Pharmacy Medication Reconciliation    Information obtained from:  Patient interview, recent [facility] encounter discharge summary dated 2/24/22  RxQuery data available1: yes    Comments/recommendations:    1) The patient was interviewed regarding current PTA medication list and drug allergies. 2) No medication changes to PTA list since recent [facility] encounter 2/18 - 2/24/22    3) Last dose given of Risperdal Consta 25 mg every 2 weeks was 2/23/22. Next dose due: 3/9/22    4) The [place] Prescription Monitoring Program () was accessed to determine fill history of any controlled medications:  No recent record since May 2021  (5/10/21 Suboxone 8/2 mg film #10 (10-day supply)  (March-April 2020 weekly Suboxone 8/2mg film RX)     1RxQuery pharmacy benefit data reflects medications filled and processed through the patient's insurance, however                this data does NOT capture whether the medication was picked up or is currently being taken by the patient. Past Medical History/Disease States:  Past Medical History:   Diagnosis Date    Anxiety and depression     Bipolar 1 disorder ([facility] [address]     Depression     Hypertension     Psychiatric disorder     bipolar         Patient allergies: Allergies as of 03/05/2022    (No Known Allergies)         Prior to Admission Medications   Prescriptions Last Dose Informant  Taking?   busPIRone (BUSPAR) 30 mg tablet 3/2/2022   Yes   Sig: Take 1 Tablet by mouth two (2) times a day. Indications: repeated episodes of anxiety   cloNIDine HCL (CATAPRES) 0.1 mg tablet 3/2/2022   Yes   Sig: Take 1 Tablet by mouth three (3) times daily as needed for Restlessness. Indications: attention deficit disorder with hyperactivity   doxepin (SINEquan) 10 mg capsule 3/2/2022   Yes   Sig: Take 1 Capsule by mouth nightly. Indications: insomnia   hydrOXYzine HCL (ATARAX) 50 mg tablet 3/2/2022   Yes   Sig: Take 1 Tablet by mouth two (2) times daily as needed for Anxiety.  Indications: anxious   risperiDONE (RisperDAL) 1 mg tablet 3/2/2022   Yes   Sig: Take 1 Tablet by mouth two (2) times a day for 40 doses. Indications: doni associated with bipolar disorder   risperiDONE microspheres (RisperDAL consta) 25 mg/2 mL injection 2022   Yes   Si mL by IntraMUSCular route Once every 2 weeks. Administer on 2022  Indications: bipolar disorder   topiramate (TOPAMAX) 50 mg tablet 3/2/2022   Yes   Sig: Take 1 Tablet by mouth daily. Indications: alcoholism   traZODone (DESYREL) 50 mg tablet 3/2/2022   Yes   Sig: Take 1 Tablet by mouth nightly as needed (For insomnia).  Indications: insomnia associated with depression                Thank you,  Virginie Ramírez, [facility]

## 2022-03-05 NOTE — BSMART NOTE
Comprehensive Assessment Form Part 1      Section I - Disposition    Axis I - Bipolar Disorder               Alcohol Use Disorder               Polysubstance Use Disorder   Axis II - Deferred  Axis III - Past Medical History     Date Comments   Bipolar 1 disorder ([facility] [address] [F31.9]     Psychiatric disorder [F99]  bipolar   Depression [F32. A]     Anxiety and depression [F41.9, F32.A]     Hypertension [I10]         Axis IV - medication non compliance, relationship with mother, substance use  Axis V -       The Medical Doctor to Psychiatrist conference was not completed. The Medical Doctor is in agreement with Psychiatrist disposition because of (reason) patient is seeking a voluntary admission. The plan is admit to behavioral health [address]  The on-call Psychiatrist consulted was Delmi Chinchilla. The admitting Psychiatrist will be Dr. Joaquin Cook  The admitting Diagnosis is Bipolar Disorder. The Payor source is [employer] MEDICARE/[facility] COMMUN[employer]AN [facility]. The name of the representative was . This was . This writer reviewed the [address] in nursing flowsheet and the risk level assigned is high risk  Based on this assessment, the risk of suicide is low risk and the plan is admit to behavioral health. Section II - Integrated Summary  Summary:  Patient is 27year old male reporting to ED states hx of bipolar.  says he \"feels tired but depressed\".  presents discheveled appearance.  denies SI/HI. At bedside, this writer spoke with patient without his mother in the room initially. Patient denied suicidal, homicidal thoughts and hallucinations. Patient stated he has been seeing \"spots\". Patient reported he has been stressed, depressed, increased anxiety, patient stated he has been walking around and reported being deprived of sex which has made him irritable. Patient reported he does indulge in drugs when asked about any prior suicidal attempts.  Patient reported increased alcohol use in the past most recently he has been drinking over the past two days. Patient reported shaking in the past. Patient stated he has been restless, irritable. Patient stated he has been walking around his neighborhood over past few days and he hasnot been staying in the home as reported after having argument. Patient stated he isolates himself and reported having self destructive behaviors as he discussed his tattoos. Patient has black marks all over his face, appeared to come from black marker. Patient reported getting tense in the home and reported having more verbal aggression than physical. Patient is seeking a a voluntary admission as reported needs to be stablized. Patient did not attend appointment with Giacomo Hastings on 2/28/22. Mother returned to room and reported since patient was discharged she did think it was a good idea for him to return due to fear he needed more treatment. Mother reported after being discharged from hospital 02/18-02/24/22 she did not see a change in his behaviors and interactions. As reported he hasnot been compliant with her medications , mother reported the plan was for her to administer them to him but he says she is too controlling and after that incident he left the home. As reported he has been sleeping outside to her knowledge and roaming the neighborhood. Mother reported patient was involved in some kind of altercation at [facility] and now he is banned as reported the police brought him to the home. Mother reported tonight the patient came back into the home with a shovel and although he did not hit anyone she reported he made threats as he did before she obtained an ECO. As reported he was screaming. Patient and mother agreed they do not get along which exacerbates his mental health and problems within the home. As reported the police were called back to the home and they allowed patient to come voluntary.          The patienthas demonstrated mental capacity to provide informed consent. The information is given by the patient and parent. The Chief Complaint is anxiety, depression, mood changes. The Precipitant Factors are relationship with mother, medication noncompliance. Previous Hospitalizations: yes  The patient has not previously been in restraints. Current Psychiatrist and/or  is Radhika CALVILLO. Lethality Assessment:    The potential for suicide noted by the following: not noted at the time of assessment . The potential for homicide is not noted, patient did not make any plans or threats as he reported but mother stated he came in house with shovel and was threatening in posture. The patient has not been a perpetrator of sexual or physical abuse. There are not pending charges. The patient is not felt to be at risk for self harm or harm to others. The attending nurse was advised patient denies plan to harm others, parent reported he was posturing and aggressive but did do anything to them tonight. Section III - Psychosocial  The patient's overall mood and attitude is depression, low mood, calm and cooperative with writer. Feelings of helplessness and hopelessness are not observed. Generalized anxiety is not observed. Panic is not observed. Phobias are not observed. Obsessive compulsive tendencies are not observed. Section IV - Mental Status Exam  The patient's appearance is unkempt and is tense when mother was talking during assessment. The patient's behavior is restless. The patient is oriented to time, place, person and situation. The patient's speech shows no evidence of impairment. The patient's mood is depressed. The range of affect is flat. The patient's thought content demonstrates no evidence of impairment. The thought process shows no evidence of impairment. The patient's perception shows no evidence of impairment. The patient's memory shows no evidence of impairment. The patient's appetite shows no evidence of impairment.   The patient's sleep shows no evidence of impairment. The patient shows little insight. The patient's judgement shows no evidence of impairment. Section V - Substance Abuse  The patient is using substances. The patient is using tobacco by inhalation for greater than 10 years with last use on yesterday, alcohol for greater than 10 years with last use on yesterday, cannabis by inhalation for greater than 10 years with last use on yesterday, cocaine by inhalation for unknown with last use on 8 months, heroin by inhalation for unknown with last use on unknown and other substances by inhalation for unknown with last use on 3 months. The patient has experienced the following withdrawal symptoms: shaking. Section VI - Living Arrangements  The patient is single. The patient lives with a parent. The patient has one child. The patient does plan to return home upon discharge. The patient does not have legal issues pending. The patient's source of income comes from disability. Restoration and cultural practices have not been voiced at this time. The patient's greatest support comes from mother and this person will be involved with the treatment. The patient has not been in an event described as horrible or outside the realm of ordinary life experience either currently or in the past.  The patient has not been a victim of sexual/physical abuse. Section VII - Other Areas of Clinical Concern  The highest grade achieved is 12th with the overall quality of school experience being described as not assessed. The patient is currently unemployed and speaks [place] as a primary language. The patient has no communication impairments affecting communication. The patient's preference for learning can be described as: can read and write adequately.   The patient's hearing is normal.  The patient's vision is normal.      Bel Ramirez

## 2022-03-06 PROCEDURE — 99232 SBSQ HOSP IP/OBS MODERATE 35: CPT | Performed by: PSYCHIATRY & NEUROLOGY

## 2022-03-06 PROCEDURE — 74011250637 HC RX REV CODE- 250/637: Performed by: PSYCHIATRY & NEUROLOGY

## 2022-03-06 PROCEDURE — 74011250637 HC RX REV CODE- 250/637: Performed by: NURSE PRACTITIONER

## 2022-03-06 PROCEDURE — 65220000003 HC RM SEMIPRIVATE PSYCH

## 2022-03-06 RX ORDER — ESCITALOPRAM OXALATE 10 MG/1
10 TABLET ORAL DAILY
Status: DISCONTINUED | OUTPATIENT
Start: 2022-03-07 | End: 2022-03-07 | Stop reason: HOSPADM

## 2022-03-06 RX ORDER — IBUPROFEN 200 MG
1 TABLET ORAL DAILY
Status: DISCONTINUED | OUTPATIENT
Start: 2022-03-06 | End: 2022-03-07 | Stop reason: HOSPADM

## 2022-03-06 RX ADMIN — RISPERIDONE 1 MG: 1 TABLET ORAL at 09:34

## 2022-03-06 RX ADMIN — DOXEPIN HYDROCHLORIDE 10 MG: 10 CAPSULE ORAL at 21:43

## 2022-03-06 RX ADMIN — BUSPIRONE HYDROCHLORIDE 30 MG: 10 TABLET ORAL at 09:34

## 2022-03-06 RX ADMIN — RISPERIDONE 1 MG: 1 TABLET ORAL at 17:19

## 2022-03-06 RX ADMIN — OLANZAPINE 5 MG: 5 TABLET, FILM COATED ORAL at 21:43

## 2022-03-06 RX ADMIN — TOPIRAMATE 50 MG: 25 TABLET, FILM COATED ORAL at 09:34

## 2022-03-06 RX ADMIN — HYDROXYZINE HYDROCHLORIDE 50 MG: 25 TABLET, FILM COATED ORAL at 15:36

## 2022-03-06 RX ADMIN — ACETAMINOPHEN 650 MG: 325 TABLET ORAL at 21:43

## 2022-03-06 RX ADMIN — BUSPIRONE HYDROCHLORIDE 30 MG: 10 TABLET ORAL at 17:19

## 2022-03-06 RX ADMIN — CLONIDINE HYDROCHLORIDE 0.1 MG: 0.1 TABLET ORAL at 15:36

## 2022-03-06 NOTE — PROGRESS NOTES
Problem: Depressed Mood (Adult/Pediatric)  Goal: *STG: Participates in treatment plan  Outcome: Progressing Towards Goal  Goal: *STG: Complies with medication therapy  Outcome: Progressing Towards Goal       0730 Pt received in room. Pt denies si/hi/ah/vh. Anxiety=/10 depression= /10. Pt is Aox4. Pt is calm and cooperative. Med and meal compliant. Pain level of 0/10 . Will continue to monitor for any changes.        Please see MAR for PRN medication administration

## 2022-03-06 NOTE — PROGRESS NOTES
Behavioral Services  Medicare Certification Upon Admission    I certify that this patient's inpatient psychiatric hospital admission is medically necessary for:    [x] (1) Treatment which could reasonably be expected to improve this patient's condition,       [x] (2) Or for diagnostic study;     AND     [x](2) The inpatient psychiatric services are provided while the individual is under the care of a physician and are included in the individualized plan of care.     Estimated length of stay/service 5-7 days    Plan for post-hospital care home    Electronically signed by Syeda Paul MD on 3/5/2022 at 8:39 PM

## 2022-03-06 NOTE — H&P
INITIAL PSYCHIATRIC EVALUATION            IDENTIFICATION:    Patient Name  Dmitriy Velazquez   Date of Birth 1990   [facility] [id number]   Medical Record Number  [id number]      Age  27 y.o. PCP None   Admit date:  3/5/2022    Room Number  402/75  @ [facility] (formerly [facility]3]   Date of Service  3/5/2022            HISTORY         REASON FOR HOSPITALIZATION:  CC: \"psychosis\". Pt admitted under a temporary intermediate order (TDO) for severe psychosis proving to be an imminent danger to self and others and an inability to care for self. HISTORY OF PRESENT ILLNESS:    The patient, Dmitriy Velazquez, is a 27 y.o. WHITE/NON- male with a past psychiatric history significant for bipolar disorder and cannabis use disorder, who presents at this time with complaints of (and/or evidence of) the following emotional symptoms: agitation, delusions and psychotic behavior. Additional symptomatology include poor self care. The above symptoms have been present for 2+ weeks. These symptoms are of moderate to high severity. These symptoms are constant in nature. The patient's condition has been precipitated by psychosocial stressors. Patient's condition made worse by continued illicit drug use as well as treatment noncompliance. UDS: +THC; BAL=0. Patient recent discharged from the hospital; at the time he acknowledged disorganization and agitation and home, agreed to getting a BOWEN and was sent home with instructions to continue his antipsychotic. Patient states that he did not follow up with medication and resumed using THC, and has been getting into escalating arguments with his family. Patient now sleeping outside and walking extensively throughout the day, resulting in sores along his feet. The patient is a fair historian. The patient corroborates the above narrative.  The patient contracts for safety on the unit and gives consent for the team to contact collateral. The patient is amenable to initiating treatment while on the unit. He is amenable to rehab placement. ALLERGIES: No Known Allergies   MEDICATIONS PRIOR TO ADMISSION:   Medications Prior to Admission   Medication Sig    doxepin (SINEquan) 10 mg capsule Take 1 Capsule by mouth nightly. Indications: insomnia    risperiDONE (RisperDAL) 1 mg tablet Take 1 Tablet by mouth two (2) times a day for 40 doses. Indications: doni associated with bipolar disorder    [START ON 3/9/2022] risperiDONE microspheres (RisperDAL consta) 25 mg/2 mL injection 2 mL by IntraMUSCular route Once every 2 weeks. Administer on March 9, 2022  Indications: bipolar disorder    busPIRone (BUSPAR) 30 mg tablet Take 1 Tablet by mouth two (2) times a day. Indications: repeated episodes of anxiety    traZODone (DESYREL) 50 mg tablet Take 1 Tablet by mouth nightly as needed (For insomnia). Indications: insomnia associated with depression    cloNIDine HCL (CATAPRES) 0.1 mg tablet Take 1 Tablet by mouth three (3) times daily as needed for Restlessness. Indications: attention deficit disorder with hyperactivity    hydrOXYzine HCL (ATARAX) 50 mg tablet Take 1 Tablet by mouth two (2) times daily as needed for Anxiety. Indications: anxious    topiramate (TOPAMAX) 50 mg tablet Take 1 Tablet by mouth daily. Indications: alcoholism      PAST MEDICAL HISTORY:   Past Medical History:   Diagnosis Date    Anxiety and depression     Bipolar 1 disorder ([facility] [address]     Depression     Hypertension     Psychiatric disorder     bipolar   No past surgical history on file. SOCIAL HISTORY:  The patient is currently unemployed; the patient is a smoker, and smokes up to 1 ppd; the patient's marital status is single; the patient does not have children; the patient reports the highest level of education achieved is high school. He has been living with parents. FAMILY HISTORY: History reviewed, pertinent family history as below:   No family history on file.     REVIEW OF SYSTEMS:   Pertinent items are noted in the History of Present Illness. All other Systems reviewed and are considered negative. MENTAL STATUS EXAM & VITALS     MENTAL STATUS EXAM (MSE):    MSE FINDINGS ARE WITHIN NORMAL LIMITS (WNL) UNLESS OTHERWISE STATED BELOW. ( ALL OF THE BELOW CATEGORIES OF THE MSE HAVE BEEN REVIEWED (reviewed 3/5/2022) AND UPDATED AS DEEMED APPROPRIATE )  General Presentation age appropriate and bearded, guarded and passive   Orientation disorganized   Vital Signs  See below (reviewed 3/5/2022); Vital Signs (BP, Pulse, & Temp) are within normal limits if not listed below.    Gait and Station Stable/steady, no ataxia   Musculoskeletal System No extrapyramidal symptoms (EPS); no abnormal muscular movements or Tardive Dyskinesia (TD); muscle strength and tone are within normal limits   Language No aphasia or dysarthria   Speech:  normal volume and soft   Thought Processes illogical; slow rate of thoughts; poor abstract reasoning/computation   Thought Associations blocked    Thought Content preoccupations and internally preoccupied   Suicidal Ideations none   Homicidal Ideations none   Mood:  depressed and anhedonia   Affect:  blunted and odd demeanor   Memory recent  intact   Memory remote:  intact   Concentration/Attention:  intact   Fund of Knowledge average   Insight:  poor   Reliability fair   Judgment:  poor          VITALS:     Patient Vitals for the past 24 hrs:   Temp Pulse Resp BP SpO2   03/05/22 1053 98 °F (36.7 °C) 71 16 129/69 100 %   03/05/22 0530 98.1 °F (36.7 °C) 99 18 (!) 150/94 100 %   03/05/22 0021 98.2 °F (36.8 °C) 98 18 (!) 174/92 98 %     Wt Readings from Last 3 Encounters:   03/05/22 94.8 kg (209 lb)   02/18/22 87.5 kg (193 lb)   12/14/21 87.9 kg (193 lb 12.8 oz)     Temp Readings from Last 3 Encounters:   03/05/22 98 °F (36.7 °C)   02/24/22 97.8 °F (36.6 °C)   12/16/21 98.6 °F (37 °C)     BP Readings from Last 3 Encounters:   03/05/22 129/69   02/24/22 128/81   12/16/21 132/80     Pulse Readings from Last 3 Encounters:   03/05/22 71   02/24/22 61   12/16/21 94            DATA     LABORATORY DATA:  Labs Reviewed   CBC WITH AUTOMATED DIFF - Abnormal; Notable for the following components:       Result Value    WBC 15.5 (*)     MPV 8.8 (*)     IMMATURE GRANULOCYTES 1 (*)     ABS. NEUTROPHILS 10.8 (*)     ABS. MONOCYTES 1.8 (*)     ABS. IMM. GRANS. 0.1 (*)     All other components within normal limits   METABOLIC PANEL, COMPREHENSIVE - Abnormal; Notable for the following components:    AST (SGOT) 53 (*)     All other components within normal limits   URINALYSIS W/ RFLX MICROSCOPIC - Abnormal; Notable for the following components:    Ketone 15 (*)     All other components within normal limits   DRUG SCREEN, URINE - Abnormal; Notable for the following components:    THC (TH-CANNABINOL) Positive (*)     All other components within normal limits   COVID-19 WITH INFLUENZA A/B   ETHYL ALCOHOL     Admission on 03/05/2022   Component Date Value Ref Range Status    SARS-CoV-2 03/05/2022 Not detected  NOTD   Final    Influenza A by PCR 03/05/2022 Not detected    Final    Influenza B by PCR 03/05/2022 Not detected    Final    WBC 03/05/2022 15.5* 4.1 - 11.1 K/uL Final    RBC 03/05/2022 4.70  4. 10 - 5.70 M/uL Final    HGB 03/05/2022 12.9  12.1 - 17.0 g/dL Final    HCT 03/05/2022 39.6  36.6 - 50.3 % Final    MCV 03/05/2022 84.3  80.0 - 99.0 FL Final    MCH 03/05/2022 27.4  26.0 - 34.0 PG Final    MCHC 03/05/2022 32.6  30.0 - 36.5 g/dL Final    RDW 03/05/2022 13.2  11.5 - 14.5 % Final    PLATELET 79/54/8136 863  150 - 400 K/uL Final    MPV 03/05/2022 8.8* 8.9 - 12.9 FL Final    NRBC 03/05/2022 0.0  0  WBC Final    ABSOLUTE NRBC 03/05/2022 0.00  0.00 - 0.01 K/uL Final    NEUTROPHILS 03/05/2022 68  32 - 75 % Final    LYMPHOCYTES 03/05/2022 18  12 - 49 % Final    MONOCYTES 03/05/2022 12  5 - 13 % Final    EOSINOPHILS 03/05/2022 0  0 - 7 % Final    BASOPHILS 03/05/2022 1  0 - 1 % Final    IMMATURE GRANULOCYTES 03/05/2022 1* 0.0 - 0.5 % Final    ABS. NEUTROPHILS 03/05/2022 10.8* 1.8 - 8.0 K/UL Final    ABS. LYMPHOCYTES 03/05/2022 2.7  0.8 - 3.5 K/UL Final    ABS. MONOCYTES 03/05/2022 1.8* 0.0 - 1.0 K/UL Final    ABS. EOSINOPHILS 03/05/2022 0.0  0.0 - 0.4 K/UL Final    ABS. BASOPHILS 03/05/2022 0.1  0.0 - 0.1 K/UL Final    ABS. IMM. GRANS. 03/05/2022 0.1* 0.00 - 0.04 K/UL Final    DF 03/05/2022 AUTOMATED    Final    Sodium 03/05/2022 140  136 - 145 mmol/L Final    Potassium 03/05/2022 3.7  3.5 - 5.1 mmol/L Final    Chloride 03/05/2022 103  97 - 108 mmol/L Final    CO2 03/05/2022 24  21 - 32 mmol/L Final    Anion gap 03/05/2022 13  5 - 15 mmol/L Final    Glucose 03/05/2022 97  65 - 100 mg/dL Final    BUN 03/05/2022 11  6 - 20 MG/DL Final    Creatinine 03/05/2022 0.80  0.70 - 1.30 MG/DL Final    BUN/Creatinine ratio 03/05/2022 14  12 - 20   Final    GFR est AA 03/05/2022 >60  >60 ml/min/1.73m2 Final    GFR est non-AA 03/05/2022 >60  >60 ml/min/1.73m2 Final    Calcium 03/05/2022 8.8  8.5 - 10.1 MG/DL Final    Bilirubin, total 03/05/2022 0.7  0.2 - 1.0 MG/DL Final    ALT (SGPT) 03/05/2022 32  12 - 78 U/L Final    AST (SGOT) 03/05/2022 53* 15 - 37 U/L Final    Alk.  phosphatase 03/05/2022 99  45 - 117 U/L Final    Protein, total 03/05/2022 7.5  6.4 - 8.2 g/dL Final    Albumin 03/05/2022 4.6  3.5 - 5.0 g/dL Final    Globulin 03/05/2022 2.9  2.0 - 4.0 g/dL Final    A-G Ratio 03/05/2022 1.6  1.1 - 2.2   Final    Color 03/05/2022 YELLOW/STRAW    Final    Appearance 03/05/2022 CLEAR  CLEAR   Final    Specific gravity 03/05/2022 1.020  1.003 - 1.030   Final    pH (UA) 03/05/2022 6.0  5.0 - 8.0   Final    Protein 03/05/2022 Negative  NEG mg/dL Final    Glucose 03/05/2022 Negative  NEG mg/dL Final    Ketone 03/05/2022 15* NEG mg/dL Final    Bilirubin 03/05/2022 Negative  NEG   Final    Blood 03/05/2022 Negative  NEG   Final    Urobilinogen 03/05/2022 0.2  0.2 - 1.0 EU/dL Final    Nitrites 03/05/2022 Negative  NEG   Final    Leukocyte Esterase 03/05/2022 Negative  NEG   Final    AMPHETAMINES 03/05/2022 Negative  NEG   Final    BARBITURATES 03/05/2022 Negative  NEG   Final    BENZODIAZEPINES 03/05/2022 Negative  NEG   Final    COCAINE 03/05/2022 Negative  NEG   Final    METHADONE 03/05/2022 Negative  NEG   Final    OPIATES 03/05/2022 Negative  NEG   Final    PCP(PHENCYCLIDINE) 03/05/2022 Negative  NEG   Final    THC (TH-CANNABINOL) 03/05/2022 Positive* NEG   Final    Drug screen comment 03/05/2022 (NOTE)   Final    ALCOHOL(ETHYL),SERUM 03/05/2022 <10  <10 MG/DL Final        RADIOLOGY REPORTS:  Results from Hospital Encounter encounter on 08/28/21    XR CHEST PA LAT    Narrative  Exam:  2 view chest    Indication: Status post fall with chest pain    Comparison to 3/19/2019. PA and lateral views demonstrate normal heart size. There is no acute process in  the lung fields. The osseous structures are unremarkable. Impression  No acute process or change compared to the prior exam.  No results found.            MEDICATIONS       ALL MEDICATIONS  Current Facility-Administered Medications   Medication Dose Route Frequency    OLANZapine (ZyPREXA) tablet 5 mg  5 mg Oral Q6H PRN    haloperidol lactate (HALDOL) injection 5 mg  5 mg IntraMUSCular Q6H PRN    benztropine (COGENTIN) tablet 1 mg  1 mg Oral BID PRN    diphenhydrAMINE (BENADRYL) injection 50 mg  50 mg IntraMUSCular BID PRN    hydrOXYzine HCL (ATARAX) tablet 50 mg  50 mg Oral TID PRN    LORazepam (ATIVAN) injection 1 mg  1 mg IntraMUSCular Q4H PRN    traZODone (DESYREL) tablet 50 mg  50 mg Oral QHS PRN    acetaminophen (TYLENOL) tablet 650 mg  650 mg Oral Q4H PRN    magnesium hydroxide (MILK OF MAGNESIA) 400 mg/5 mL oral suspension 30 mL  30 mL Oral DAILY PRN    [START ON 3/9/2022] risperiDONE microspheres (RisperDAL consta) injection 25 mg  25 mg IntraMUSCular EVERY 2 WEEKS    [START ON 3/6/2022] topiramate (TOPAMAX) tablet 50 mg  50 mg Oral DAILY    risperiDONE (RisperDAL) tablet 1 mg  1 mg Oral BID    doxepin (SINEquan) capsule 10 mg  10 mg Oral QHS    cloNIDine HCL (CATAPRES) tablet 0.1 mg  0.1 mg Oral TID PRN    busPIRone (BUSPAR) tablet 30 mg  30 mg Oral BID      SCHEDULED MEDICATIONS  Current Facility-Administered Medications   Medication Dose Route Frequency    [START ON 3/9/2022] risperiDONE microspheres (RisperDAL consta) injection 25 mg  25 mg IntraMUSCular EVERY 2 WEEKS    [START ON 3/6/2022] topiramate (TOPAMAX) tablet 50 mg  50 mg Oral DAILY    risperiDONE (RisperDAL) tablet 1 mg  1 mg Oral BID    doxepin (SINEquan) capsule 10 mg  10 mg Oral QHS    busPIRone (BUSPAR) tablet 30 mg  30 mg Oral BID                ASSESSMENT & PLAN        The patient, Amaury Carter, is a 27 y.o.  male who presents at this time for treatment of the following diagnoses:  Patient Active Hospital Problem List:   Bipolar 1 disorder ([facility] [address] (3/5/2022)    Assessment: patient re-admitted to the unit in the setting of worsening symptoms of confusion and agitation. His decompensation appears influenced by both non-compliance with his regimen and resumption of using psychoactive drugs. Plan:   - RESTART Risperdal Conta 25 mg IM Q2Wks for psychosis, BOWEN (next due 3/9/2022)  - RESTART Buspar 15 mg BID for anxiety  - RESTART Doxepin 10 mg QHS for insomnia  - RESTART Topamax 50 mg QDAY for EtOH use disorder  - Consider mood stabilizer  - IGM therapy as tolerated  - Expand database / obtain collateral  - Dispo planning (rehab vs CSU)           A coordinated, multidisplinary treatment team (includes the nurse, unit pharmacist,  and Jessie Tee) round was conducted for this initial evaluation with the patient present. The following regarding medications was addressed during rounds with patient: the risks and benefits of the proposed medication. The patient was given the opportunity to ask questions.  Informed consent given to the use of the above medications. I will continue to adjust psychiatric and non-psychiatric medications (see above \"medication\" section and orders section for details) as deemed appropriate & based upon diagnoses and response to treatment. I have reviewed admission (and previous/old) labs and medical tests in the EHR and or transferring hospital documents. I will continue to order blood tests/labs and diagnostic tests as deemed appropriate and review results as they become available (see orders for details). I have reviewed old psychiatric and medical records available in the EHR. I Will order additional psychiatric records from other institutions to further elucidate the nature of patient's psychopathology and review once available. I will gather additional collateral information from friends, family and o/p treatment team to further elucidate the nature of patient's psychopathology and baselline level of psychiatric functioning. I certify that this patient's inpatient psychiatric hospital services are required for treatment that could reasonably be expected to improve the patient's condition, or for diagnostic study, and that the patient continues to need, on a daily basis, active treatment furnished directly by or requiring the supervision of inpatient psychiatric facility personnel. In addition, the hospital records show that services furnished were intensive treatment services, admission or related services, or equivalent services.       ESTIMATED LENGTH OF STAY:  5-7 days       STRENGTHS:  Exercising self-direction/Resourceful, Interpersonal/supportive relationships (family, friends, peers) and Awareness of Substance abuse issues                                        SIGNED:    Keenan Interiano MD  3/5/2022

## 2022-03-06 NOTE — PROGRESS NOTES
Problem: Depressed Mood (Adult/Pediatric)  Goal: *STG: Demonstrates reduction in symptoms and increase in insight into coping skills/future focused  Outcome: Progressing Towards Goal     Problem: Depressed Mood (Adult/Pediatric)  Goal: *STG: Remains safe in hospital  Outcome: Progressing Towards Goal     Problem: Anxiety-Behavioral Health (Adult/Pediatric)  Goal: *STG/LTG: Complies with medication therapy  Outcome: Progressing Towards Goal   1900- 0700; [id number]: Report received from out going day shift RN. Assumed cre of Patient. Patient is quiet in bed. Patient denies any S/I, H/I, A/H/H. Ptient reports pain and discomfort of foot, anxiety level 7 and depression ( 9). Patient received PRN [id number], Trazodone 50mg, acetaminophen 650mg P.O. Patient observed and monitored for safety during shift. Patient slept for 8.5 hrs.

## 2022-03-07 VITALS
RESPIRATION RATE: 16 BRPM | TEMPERATURE: 96.8 F | OXYGEN SATURATION: 99 % | HEART RATE: 86 BPM | DIASTOLIC BLOOD PRESSURE: 75 MMHG | BODY MASS INDEX: 26.82 KG/M2 | HEIGHT: 74 IN | SYSTOLIC BLOOD PRESSURE: 117 MMHG | WEIGHT: 209 LBS

## 2022-03-07 PROCEDURE — 74011250637 HC RX REV CODE- 250/637: Performed by: NURSE PRACTITIONER

## 2022-03-07 PROCEDURE — 99239 HOSP IP/OBS DSCHRG MGMT >30: CPT | Performed by: PSYCHIATRY & NEUROLOGY

## 2022-03-07 PROCEDURE — 74011250637 HC RX REV CODE- 250/637: Performed by: PSYCHIATRY & NEUROLOGY

## 2022-03-07 RX ORDER — ASPIRIN/CALCIUM CARB/MAGNESIUM 325 MG
4 TABLET ORAL
Status: DISCONTINUED | OUTPATIENT
Start: 2022-03-07 | End: 2022-03-07 | Stop reason: HOSPADM

## 2022-03-07 RX ADMIN — BUSPIRONE HYDROCHLORIDE 30 MG: 10 TABLET ORAL at 08:14

## 2022-03-07 RX ADMIN — Medication 4 MG: at 16:30

## 2022-03-07 RX ADMIN — TOPIRAMATE 50 MG: 25 TABLET, FILM COATED ORAL at 08:14

## 2022-03-07 RX ADMIN — HYDROXYZINE HYDROCHLORIDE 50 MG: 25 TABLET, FILM COATED ORAL at 12:12

## 2022-03-07 RX ADMIN — RISPERIDONE 1 MG: 1 TABLET ORAL at 08:14

## 2022-03-07 RX ADMIN — ESCITALOPRAM OXALATE 10 MG: 10 TABLET ORAL at 08:14

## 2022-03-07 NOTE — GROUP NOTE
DEVANTE  GROUP DOCUMENTATION INDIVIDUAL                                                                          Group Therapy Note    Date: 3/7/2022    Group Start Time: 1000  Group End Time: 1100  Group Topic: Topic Group    [facility] ACUTE BEH[facility], [address] [address] GROUP    Group Therapy Note    Attendees: 6         Attendance: Did not attend    Patient's Goal:      Interventions/techniques:  Anayeli Wall

## 2022-03-07 NOTE — PROGRESS NOTES
Laboratory monitoring for mood stabilizer and antipsychotics:    Recommended baseline monitoring has been completed based on this patient's current medication regimen. The patient is currently taking the following medication(s):   Current Facility-Administered Medications   Medication Dose Route Frequency    nicotine (NICODERM CQ) 21 mg/24 hr patch 1 Patch  1 Patch TransDERmal DAILY    escitalopram oxalate (LEXAPRO) tablet 10 mg  10 mg Oral DAILY    [START ON 3/9/2022] risperiDONE microspheres (RisperDAL consta) injection 25 mg  25 mg IntraMUSCular EVERY 2 WEEKS    topiramate (TOPAMAX) tablet 50 mg  50 mg Oral DAILY    risperiDONE (RisperDAL) tablet 1 mg  1 mg Oral BID    doxepin (SINEquan) capsule 10 mg  10 mg Oral QHS    busPIRone (BUSPAR) tablet 30 mg  30 mg Oral BID       Height, Weight, BMI Estimation  Estimated body mass index is 26.83 kg/m² as calculated from the following:    Height as of this encounter: 188 cm (74\"). Weight as of this encounter: 94.8 kg (209 lb). Renal Function, Hepatic Function and Chemistry  Estimated Creatinine Clearance: 154.1 mL/min (by C-G formula based on SCr of 0.8 mg/dL). Lab Results   Component Value Date/Time    Sodium 140 03/05/2022 01:04 AM    Potassium 3.7 03/05/2022 01:04 AM    Chloride 103 03/05/2022 01:04 AM    CO2 24 03/05/2022 01:04 AM    Anion gap 13 03/05/2022 01:04 AM    Glucose 97 03/05/2022 01:04 AM    BUN 11 03/05/2022 01:04 AM    Creatinine 0.80 03/05/2022 01:04 AM    BUN/Creatinine ratio 14 03/05/2022 01:04 AM    GFR est AA >60 03/05/2022 01:04 AM    GFR est non-AA >60 03/05/2022 01:04 AM    Calcium 8.8 03/05/2022 01:04 AM    ALT (SGPT) 32 03/05/2022 01:04 AM    Alk.  phosphatase 99 03/05/2022 01:04 AM    Protein, total 7.5 03/05/2022 01:04 AM    Albumin 4.6 03/05/2022 01:04 AM    Globulin 2.9 03/05/2022 01:04 AM    A-G Ratio 1.6 03/05/2022 01:04 AM    Bilirubin, total 0.7 03/05/2022 01:04 AM       Lab Results   Component Value Date/Time    Glucose 97 03/05/2022 01:04 AM       Lab Results   Component Value Date/Time    Hemoglobin A1c 5.7 (H) 02/22/2022 06:05 AM       Hematology  Lab Results   Component Value Date/Time    WBC 15.5 (H) 03/05/2022 01:04 AM    HGB 12.9 03/05/2022 01:04 AM    HCT 39.6 03/05/2022 01:04 AM    PLATELET 290 64/36/3159 01:04 AM    MCV 84.3 03/05/2022 01:04 AM       Lipids  Lab Results   Component Value Date/Time    Cholesterol, total 164 02/22/2022 06:05 AM    HDL Cholesterol 38 02/22/2022 06:05 AM    LDL, calculated 104.4 (H) 02/22/2022 06:05 AM    Triglyceride 108 02/22/2022 06:05 AM    CHOL/HDL Ratio 4.3 02/22/2022 06:05 AM       Thyroid Function    Lab Results   Component Value Date/Time    TSH 1.81 02/22/2022 06:05 AM     Vitals  Visit Vitals  /75   Pulse 86   Temp 96.8 °F (36 °C)   Resp 16   Ht 188 cm (74\")   Wt 94.8 kg (209 lb)   SpO2 99%   BMI 26.83 kg/m²       Sandra Apple, PHARMD  [phone] (pharmacy)

## 2022-03-07 NOTE — PROGRESS NOTES
Patient received resting in his room. Denies SI/HI/AVH and depression, endorses an anxiety level of 9. Patient calm and cooperative at the beginning of the shift. Patient becoming more agitated as the day wears on. He requested medication for anxiety. Atarax administered. Around 1315 patient began yelling in his room. He has a wound on his heel and needed a bandaid for it. Bandaid supplied, patient calmed. At 1515 patient asked for additional medications and then decided that he was ready to leave. Dr. Maddy Wilson contacted. Dr. Sharri Kulkarni would like patient assessed by crisis.

## 2022-03-07 NOTE — PROGRESS NOTES
Auto-MST trigger per RN admission assessment. No acute nutrition or weight issues identified. Pt meal compliant. Hx notable for pre-diabetes (A1c 5.7), substance abuse, non-compliance. Double portions ordered; pt may benefit from CC diet with 4 CHO choices per meal.  Supplements not indicated; please do not order.   Ht: 6'2\"  Wt: 209 lb  BMI: 26.83 kg/(m^2) c/w overweight  Est energy needs: 2250 kcal, 74 g protein, 1 mL/kcal fluids  Pt will consume > 75% of meals at follow up 7-10 days  LOS, MST

## 2022-03-07 NOTE — DISCHARGE INSTRUCTIONS
DISCHARGE SUMMARY    NAME:Doni Wells  : 1990  MRN: [id number]    The patient Hemalatha Covington exhibits the ability to control behavior in a less restrictive environment. Patient's level of functioning is improving. No assaultive/destructive behavior has been observed for the past 24 hours. No suicidal/homicidal threat or behavior has been observed for the past 24 hours. There is no evidence of serious medication side effects. Patient has not been in physical or protective restraints for at least the past 24 hours. If weapons involved, how are they secured? ***    Is patient aware of and in agreement with discharge plan? ***    Arrangements for medication:  Prescriptions given to patient, given a weeks supply or 30 day supply. Copy of discharge instructions to provider?:  ***    Arrangements for transportation home:  ***    Keep all follow up appointments as scheduled, continue to take prescribed medications per physician instructions. Mental health crisis number:  042 or your local mental health crisis line number at [facility] at [address] [place] [address] Emergency WARM LINE      2-473-161-MH ([phone])      M-F: 9am to 9pm      Sat & Sun: 5pm - 9pm  National suicide prevention lines:                             8-433-TBJEQMF (4-817-347-936-248-9766)       8-711-477-TALK (8-822-842-477.873.4261)    Crisis Text Line:  Text HOME to 282191  . Ciro Rodriguez If I feel I am at risk of hurting myself or others, I will call the crisis office and speak with a crisis worker who will assist me during my crisis. Ciro Rodriguez [place] [address]  [phone]  [address] [place] [address] [phone]  [facility]-  [phone]    Patient Education        Bipolar Disorder: Care Instructions  Your Care Instructions     Bipolar disorder is an illness that causes extreme mood changes, from times of very high energy (manic episodes) to times of depression. But many people with bipolar disorder show only the symptoms of depression. These moods may cause problems with your work, school, family life, friendships, and how well you function. This disease is also called manic-depression. There is no cure for bipolar disorder, but it can be helped with medicines. Counseling may also help. It is important to take your medicines exactly as prescribed, even when you feel well. You may need lifelong treatment. Follow-up care is a key part of your treatment and safety. Be sure to make and go to all appointments, and call your doctor if you are having problems. It's also a good idea to know your test results and keep a list of the medicines you take. How can you care for yourself at home? · Be safe with medicines. Take your medicines exactly as prescribed. Do not stop or change a medicine without talking to your doctor first. Pritesh Tariq and your doctor may need to try different combinations of medicines to find what works for you. · Take your medicines on schedule to keep your moods even. When you feel good, you may think that you do not need your medicines. But it is important to keep taking them. · Go to your counseling sessions. Call and talk with your counselor if you can't go to a session or if you don't think the sessions are helping. Do not just stop going. · Get at least 30 minutes of activity on most days of the week. Walking is a good choice. You also may want to do other things, such as running, swimming, or cycling. · Get enough sleep. Keep your room dark and quiet. Try to go to bed at the same time every night. · Eat a healthy diet. This includes whole grains, dairy, fruits, vegetables, and protein. Eat foods from each of these groups. · Try to lower your stress. Manage your time, build a strong support system, and lead a healthy lifestyle.  To lower your stress, try physical activity, slow deep breathing, or getting a massage. · Do not use alcohol, marijuana, or illegal drugs. · Learn the early signs of your mood changes. You can then take steps to help yourself feel better. · Ask for help from friends and family when you need it. You may need help with daily chores when you are depressed. When you are manic, you may need support to control your high energy levels. What should you do if someone in your family has bipolar disorder? · Learn about the disease and signs it's getting worse. · Remind your family member you love them. · Make a plan with all family members about how to take care of your loved one when symptoms are bad. · Remind yourself it will take time for changes to occur. · Try not to blame yourself for the disease. · Know your legal rights and the legal rights of your family member. Support groups or counselors can help with this information. · Take care of yourself. Keep up with your interests, such as career, hobbies, and friends. Use exercise, positive self-talk, deep breathing, and other relaxing exercises to help lower your stress. · Give yourself time to grieve. You may need to deal with emotions such as anger, fear, and frustration. · If you are having a hard time with your feelings or with your relationship with your family member, talk with a counselor. When should you call for help? Call 911 anytime you think you may need emergency care. For example, call if:    · You feel like hurting yourself or someone else.     · Someone who has bipolar disorder displays dangerous behavior, and you think the person might hurt himself or herself or someone else. Call your doctor now or seek immediate medical care if:    · You hear voices.     · Someone you know has bipolar disorder and talks about suicide. Keep the numbers for these national suicide hotlines: 5-905-812-TALK (6-404.380.6494) and 0-441-BCENQBA (8-220.439.5824).  If a suicide threat seems real, with a specific plan and a way to carry it out, stay with the person, or ask someone you trust to stay with the person, until you can get help.     · Someone you know has bipolar disorder and:  ? Starts to give away possessions. ? Is using illegal drugs or drinking alcohol heavily. ? Talks or writes about death, including writing suicide notes or talking about guns, knives, or pills. ? Talks or writes about hurting someone else. ? Starts to spend a lot of time alone. ? Acts very aggressively or suddenly appears calm. ? Talks about beliefs that are not based in reality (delusions). Watch closely for changes in your health, and be sure to contact your doctor if:    · You cannot go to your counseling sessions. Where can you learn more? Go to http://www.vance.com/  Enter K052 in the search box to learn more about \"Bipolar Disorder: Care Instructions. \"  Current as of: June 16, 2021               Content Version: 13.0  © 5557-4565 [employer]. Care instructions adapted under license by [employer] (which disclaims liability or warranty for this information). If you have questions about a medical condition or this instruction, always ask your healthcare professional. [address] any warranty or liability for your use of this information.

## 2022-03-07 NOTE — BH NOTES
GROUP THERAPY PROGRESS NOTE    Ezra Washington is participating in discharge planning group. Group time: 30 minutes     Personal goal for participation: : Process feelings related to discharge and/or feelings/goals for today. Goal orientation: Personal    Group therapy participation: Active    Therapeutic interventions reviewed and discussed: Group discussion was focused on discharge plans and anxiety related to this. Group members discussed what they planned to do once discharged and discharge plans. Patients discussed their goals for today and what they are working on with treatment team to get ready for discharge. Patients were reminded of things to consider including who they will see outpatient (psychiatrist/NP, , therapist/counselor), where they will go, how they will get there, and where they want to get their medications from. Patients were also reminded or informed who their treatment team is while they are in the hospital, some important rules on the unit, and the schedule for todays groups and meals. Impression of participation: Patient was able to identify that his goals for today included the following: keeping in touch with his family and taking medications as prescribed. Patient continues to towards to show progress towards his goals. Patient presented anxious during group by his inability to keep his foot still without shaking.     SENDY Rai

## 2022-03-07 NOTE — BH NOTES
1600-Patient was evaluated by crisis  Counselor Driss Mccall  Who stated that the patient does not meet Criteria for  TOD  Because he Is not a danger to Himself  Or other and  He has capacity to make his own decision  . Dr Monica Mojica was notified  And stated to let the patient go AMA at this time.

## 2022-03-07 NOTE — PROGRESS NOTES
Problem: Depressed Mood (Adult/Pediatric)  Goal: *STG: Participates in treatment plan  Outcome: Progressing Towards Goal  Goal: *STG: Verbalizes anger, guilt, and other feelings in a constructive manor  Outcome: Progressing Towards Goal  Goal: *STG: Attends activities and groups  Outcome: Progressing Towards Goal  Goal: *STG: Demonstrates reduction in symptoms and increase in insight into coping skills/future focused  Outcome: Progressing Towards Goal     8066-3129 Shift report received from Olinda Baker [address] Patient met sleeping in bed, calm and cooperative. He denies SI/HI/VAH, rated anxiety at 7/10 and depression at 9/10.  Patient is medication and meal compliant, Prn Zyprexa given. No violence or aggression recorded.      7549- 5934 Patient resting in his room. No violence recorded.   Quick 15 minutes checks ongoing for safety. 0400- 0600 Patient is sleeping. Q15 minutes checks ongoing.      At 0600, he slept a total of 9.5 hours.

## 2022-03-07 NOTE — GROUP NOTE
DEVANTE  GROUP DOCUMENTATION INDIVIDUAL                                                                          Group Therapy Note    Date: 3/7/2022    Group Start Time: 1400  Group End Time: 1500  Group Topic: Recreational/Music Therapy    [address] [facility] 3 ACUTE BEHAV [facility]    Duncan Faust [facility] GROUP    Group Therapy Note    Attendees: 7         Attendance: Did not attend    Patient's Goal:      Interventions/techniques  Meryle Mcbride

## 2022-03-07 NOTE — BH NOTES
PSYCHIATRIC PROGRESS NOTE           IDENTIFICATION:    Patient Name  Elana Pryor   Date of Birth 1990   [facility] [id number]   Medical Record Number  [id number]      Age  27 y.o. PCP None   Admit date:  3/5/2022    Room Number  058/99  @ [facility]   Date of Service  3/6/2022            HISTORY         REASON FOR HOSPITALIZATION:  CC: \"psychosis\". Pt admitted under a temporary long-term order (TDO) for severe psychosis proving to be an imminent danger to self and others and an inability to care for self. HISTORY OF PRESENT ILLNESS:    The patient, Elana Pryor, is a 27 y.o. WHITE/NON- male with a past psychiatric history significant for bipolar disorder and cannabis use disorder, who presents at this time with complaints of (and/or evidence of) the following emotional symptoms: agitation, delusions and psychotic behavior. Additional symptomatology include poor self care. The above symptoms have been present for 2+ weeks. These symptoms are of moderate to high severity. These symptoms are constant in nature. The patient's condition has been precipitated by psychosocial stressors. Patient's condition made worse by continued illicit drug use as well as treatment noncompliance. UDS: +THC; BAL=0. Patient recent discharged from the hospital; at the time he acknowledged disorganization and agitation and home, agreed to getting a BOWEN and was sent home with instructions to continue his antipsychotic. Patient states that he did not follow up with medication and resumed using THC, and has been getting into escalating arguments with his family. Patient now sleeping outside and walking extensively throughout the day, resulting in sores along his feet. The patient is a fair historian. The patient corroborates the above narrative.  The patient contracts for safety on the unit and gives consent for the team to contact collateral. The patient is amenable to initiating treatment while on the unit. He is amenable to rehab placement. 03/06 - the patient slept 8.5 hours overnight; he got Tylenol and Atarax PRN. Patient reports feeling depressed, and continues to c/o pain his feet. He is open to starting a mood agent but asks about benzodiazepines, which is not on the table. Patient denies SI/HI/AVH/PI. ALLERGIES: No Known Allergies   MEDICATIONS PRIOR TO ADMISSION:   Medications Prior to Admission   Medication Sig    doxepin (SINEquan) 10 mg capsule Take 1 Capsule by mouth nightly. Indications: insomnia    risperiDONE (RisperDAL) 1 mg tablet Take 1 Tablet by mouth two (2) times a day for 40 doses. Indications: doni associated with bipolar disorder    [START ON 3/9/2022] risperiDONE microspheres (RisperDAL consta) 25 mg/2 mL injection 2 mL by IntraMUSCular route Once every 2 weeks. Administer on March 9, 2022  Indications: bipolar disorder    busPIRone (BUSPAR) 30 mg tablet Take 1 Tablet by mouth two (2) times a day. Indications: repeated episodes of anxiety    traZODone (DESYREL) 50 mg tablet Take 1 Tablet by mouth nightly as needed (For insomnia). Indications: insomnia associated with depression    cloNIDine HCL (CATAPRES) 0.1 mg tablet Take 1 Tablet by mouth three (3) times daily as needed for Restlessness. Indications: attention deficit disorder with hyperactivity    hydrOXYzine HCL (ATARAX) 50 mg tablet Take 1 Tablet by mouth two (2) times daily as needed for Anxiety. Indications: anxious    topiramate (TOPAMAX) 50 mg tablet Take 1 Tablet by mouth daily. Indications: alcoholism      PAST MEDICAL HISTORY:   Past Medical History:   Diagnosis Date    Anxiety and depression     Bipolar 1 disorder ([facility] [address]     Depression     Hypertension     Psychiatric disorder     bipolar   No past surgical history on file.    SOCIAL HISTORY:  The patient is currently unemployed; the patient is a smoker, and smokes up to 1 ppd; the patient's marital status is single; the patient does not have children; the patient reports the highest level of education achieved is high school. He has been living with parents. FAMILY HISTORY: History reviewed, pertinent family history as below:   No family history on file. REVIEW OF SYSTEMS:   Pertinent items are noted in the History of Present Illness. All other Systems reviewed and are considered negative. MENTAL STATUS EXAM & VITALS     MENTAL STATUS EXAM (MSE):    MSE FINDINGS ARE WITHIN NORMAL LIMITS (WNL) UNLESS OTHERWISE STATED BELOW. ( ALL OF THE BELOW CATEGORIES OF THE MSE HAVE BEEN REVIEWED (reviewed 3/6/2022) AND UPDATED AS DEEMED APPROPRIATE )  General Presentation age appropriate and bearded, guarded and passive   Orientation disorganized   Vital Signs  See below (reviewed 3/6/2022); Vital Signs (BP, Pulse, & Temp) are within normal limits if not listed below.    Gait and Station Stable/steady, no ataxia   Musculoskeletal System No extrapyramidal symptoms (EPS); no abnormal muscular movements or Tardive Dyskinesia (TD); muscle strength and tone are within normal limits   Language No aphasia or dysarthria   Speech:  normal volume and soft   Thought Processes illogical; slow rate of thoughts; poor abstract reasoning/computation   Thought Associations blocked    Thought Content preoccupations and internally preoccupied   Suicidal Ideations none   Homicidal Ideations none   Mood:  depressed and anhedonia   Affect:  blunted and odd demeanor   Memory recent  intact   Memory remote:  intact   Concentration/Attention:  intact   Fund of Knowledge average   Insight:  poor   Reliability fair   Judgment:  poor          VITALS:     Patient Vitals for the past 24 hrs:   Temp Pulse Resp BP SpO2   03/06/22 0935 98 °F (36.7 °C) 84 18 133/85 100 %   03/05/22 2030 98.6 °F (37 °C) 74 18 108/86 100 %     Wt Readings from Last 3 Encounters:   03/05/22 94.8 kg (209 lb)   02/18/22 87.5 kg (193 lb)   12/14/21 87.9 kg (193 lb 12.8 oz)     Temp Readings from Last 3 Encounters:   03/06/22 98 °F (36.7 °C)   02/24/22 97.8 °F (36.6 °C)   12/16/21 98.6 °F (37 °C)     BP Readings from Last 3 Encounters:   03/06/22 133/85   02/24/22 128/81   12/16/21 132/80     Pulse Readings from Last 3 Encounters:   03/06/22 84   02/24/22 61   12/16/21 94            DATA     LABORATORY DATA:  Labs Reviewed   CBC WITH AUTOMATED DIFF - Abnormal; Notable for the following components:       Result Value    WBC 15.5 (*)     MPV 8.8 (*)     IMMATURE GRANULOCYTES 1 (*)     ABS. NEUTROPHILS 10.8 (*)     ABS. MONOCYTES 1.8 (*)     ABS. IMM. GRANS. 0.1 (*)     All other components within normal limits   METABOLIC PANEL, COMPREHENSIVE - Abnormal; Notable for the following components:    AST (SGOT) 53 (*)     All other components within normal limits   URINALYSIS W/ RFLX MICROSCOPIC - Abnormal; Notable for the following components:    Ketone 15 (*)     All other components within normal limits   DRUG SCREEN, URINE - Abnormal; Notable for the following components:    THC (TH-CANNABINOL) Positive (*)     All other components within normal limits   COVID-19 WITH INFLUENZA A/B   ETHYL ALCOHOL     Admission on 03/05/2022   Component Date Value Ref Range Status    SARS-CoV-2 03/05/2022 Not detected  NOTD   Final    Influenza A by PCR 03/05/2022 Not detected    Final    Influenza B by PCR 03/05/2022 Not detected    Final    WBC 03/05/2022 15.5* 4.1 - 11.1 K/uL Final    RBC 03/05/2022 4.70  4. 10 - 5.70 M/uL Final    HGB 03/05/2022 12.9  12.1 - 17.0 g/dL Final    HCT 03/05/2022 39.6  36.6 - 50.3 % Final    MCV 03/05/2022 84.3  80.0 - 99.0 FL Final    MCH 03/05/2022 27.4  26.0 - 34.0 PG Final    MCHC 03/05/2022 32.6  30.0 - 36.5 g/dL Final    RDW 03/05/2022 13.2  11.5 - 14.5 % Final    PLATELET 96/16/2369 348  150 - 400 K/uL Final    MPV 03/05/2022 8.8* 8.9 - 12.9 FL Final    NRBC 03/05/2022 0.0  0  WBC Final    ABSOLUTE NRBC 03/05/2022 0.00  0.00 - 0.01 K/uL Final    NEUTROPHILS 03/05/2022 68  32 - 75 % Final    LYMPHOCYTES 03/05/2022 18  12 - 49 % Final    MONOCYTES 03/05/2022 12  5 - 13 % Final    EOSINOPHILS 03/05/2022 0  0 - 7 % Final    BASOPHILS 03/05/2022 1  0 - 1 % Final    IMMATURE GRANULOCYTES 03/05/2022 1* 0.0 - 0.5 % Final    ABS. NEUTROPHILS 03/05/2022 10.8* 1.8 - 8.0 K/UL Final    ABS. LYMPHOCYTES 03/05/2022 2.7  0.8 - 3.5 K/UL Final    ABS. MONOCYTES 03/05/2022 1.8* 0.0 - 1.0 K/UL Final    ABS. EOSINOPHILS 03/05/2022 0.0  0.0 - 0.4 K/UL Final    ABS. BASOPHILS 03/05/2022 0.1  0.0 - 0.1 K/UL Final    ABS. IMM. GRANS. 03/05/2022 0.1* 0.00 - 0.04 K/UL Final    DF 03/05/2022 AUTOMATED    Final    Sodium 03/05/2022 140  136 - 145 mmol/L Final    Potassium 03/05/2022 3.7  3.5 - 5.1 mmol/L Final    Chloride 03/05/2022 103  97 - 108 mmol/L Final    CO2 03/05/2022 24  21 - 32 mmol/L Final    Anion gap 03/05/2022 13  5 - 15 mmol/L Final    Glucose 03/05/2022 97  65 - 100 mg/dL Final    BUN 03/05/2022 11  6 - 20 MG/DL Final    Creatinine 03/05/2022 0.80  0.70 - 1.30 MG/DL Final    BUN/Creatinine ratio 03/05/2022 14  12 - 20   Final    GFR est AA 03/05/2022 >60  >60 ml/min/1.73m2 Final    GFR est non-AA 03/05/2022 >60  >60 ml/min/1.73m2 Final    Calcium 03/05/2022 8.8  8.5 - 10.1 MG/DL Final    Bilirubin, total 03/05/2022 0.7  0.2 - 1.0 MG/DL Final    ALT (SGPT) 03/05/2022 32  12 - 78 U/L Final    AST (SGOT) 03/05/2022 53* 15 - 37 U/L Final    Alk.  phosphatase 03/05/2022 99  45 - 117 U/L Final    Protein, total 03/05/2022 7.5  6.4 - 8.2 g/dL Final    Albumin 03/05/2022 4.6  3.5 - 5.0 g/dL Final    Globulin 03/05/2022 2.9  2.0 - 4.0 g/dL Final    A-G Ratio 03/05/2022 1.6  1.1 - 2.2   Final    Color 03/05/2022 YELLOW/STRAW    Final    Appearance 03/05/2022 CLEAR  CLEAR   Final    Specific gravity 03/05/2022 1.020  1.003 - 1.030   Final    pH (UA) 03/05/2022 6.0  5.0 - 8.0   Final    Protein 03/05/2022 Negative  NEG mg/dL Final    Glucose 03/05/2022 Negative  NEG mg/dL Final    Ketone 03/05/2022 15* NEG mg/dL Final    Bilirubin 03/05/2022 Negative  NEG   Final    Blood 03/05/2022 Negative  NEG   Final    Urobilinogen 03/05/2022 0.2  0.2 - 1.0 EU/dL Final    Nitrites 03/05/2022 Negative  NEG   Final    Leukocyte Esterase 03/05/2022 Negative  NEG   Final    AMPHETAMINES 03/05/2022 Negative  NEG   Final    BARBITURATES 03/05/2022 Negative  NEG   Final    BENZODIAZEPINES 03/05/2022 Negative  NEG   Final    COCAINE 03/05/2022 Negative  NEG   Final    METHADONE 03/05/2022 Negative  NEG   Final    OPIATES 03/05/2022 Negative  NEG   Final    PCP(PHENCYCLIDINE) 03/05/2022 Negative  NEG   Final    THC (TH-CANNABINOL) 03/05/2022 Positive* NEG   Final    Drug screen comment 03/05/2022 (NOTE)   Final    ALCOHOL(ETHYL),SERUM 03/05/2022 <10  <10 MG/DL Final        RADIOLOGY REPORTS:  Results from Hospital Encounter encounter on 08/28/21    XR CHEST PA LAT    Narrative  Exam:  2 view chest    Indication: Status post fall with chest pain    Comparison to 3/19/2019. PA and lateral views demonstrate normal heart size. There is no acute process in  the lung fields. The osseous structures are unremarkable. Impression  No acute process or change compared to the prior exam.  No results found.            MEDICATIONS       ALL MEDICATIONS  Current Facility-Administered Medications   Medication Dose Route Frequency    nicotine (NICODERM CQ) 21 mg/24 hr patch 1 Patch  1 Patch TransDERmal DAILY    OLANZapine (ZyPREXA) tablet 5 mg  5 mg Oral Q6H PRN    haloperidol lactate (HALDOL) injection 5 mg  5 mg IntraMUSCular Q6H PRN    benztropine (COGENTIN) tablet 1 mg  1 mg Oral BID PRN    diphenhydrAMINE (BENADRYL) injection 50 mg  50 mg IntraMUSCular BID PRN    hydrOXYzine HCL (ATARAX) tablet 50 mg  50 mg Oral TID PRN    LORazepam (ATIVAN) injection 1 mg  1 mg IntraMUSCular Q4H PRN    traZODone (DESYREL) tablet 50 mg  50 mg Oral QHS PRN    acetaminophen (TYLENOL) tablet 650 mg  650 mg Oral Q4H PRN    magnesium hydroxide (MILK OF MAGNESIA) 400 mg/5 mL oral suspension 30 mL  30 mL Oral DAILY PRN    [START ON 3/9/2022] risperiDONE microspheres (RisperDAL consta) injection 25 mg  25 mg IntraMUSCular EVERY 2 WEEKS    topiramate (TOPAMAX) tablet 50 mg  50 mg Oral DAILY    risperiDONE (RisperDAL) tablet 1 mg  1 mg Oral BID    doxepin (SINEquan) capsule 10 mg  10 mg Oral QHS    cloNIDine HCL (CATAPRES) tablet 0.1 mg  0.1 mg Oral TID PRN    busPIRone (BUSPAR) tablet 30 mg  30 mg Oral BID      SCHEDULED MEDICATIONS  Current Facility-Administered Medications   Medication Dose Route Frequency    nicotine (NICODERM CQ) 21 mg/24 hr patch 1 Patch  1 Patch TransDERmal DAILY    [START ON 3/9/2022] risperiDONE microspheres (RisperDAL consta) injection 25 mg  25 mg IntraMUSCular EVERY 2 WEEKS    topiramate (TOPAMAX) tablet 50 mg  50 mg Oral DAILY    risperiDONE (RisperDAL) tablet 1 mg  1 mg Oral BID    doxepin (SINEquan) capsule 10 mg  10 mg Oral QHS    busPIRone (BUSPAR) tablet 30 mg  30 mg Oral BID                ASSESSMENT & PLAN        The patient, Macario Bosworth, is a 27 y.o.  male who presents at this time for treatment of the following diagnoses:  Patient Active Hospital Problem List:   Bipolar 1 disorder ([facility] [address] (3/5/2022)    Assessment: patient re-admitted to the unit in the setting of worsening symptoms of confusion and agitation. His decompensation appears influenced by both non-compliance with his regimen and resumption of using psychoactive drugs.     Plan:   - CONTINUE Risperdal Conta 25 mg IM Q2Wks for psychosis, BOWEN (next due 3/9/2022)  - CONTINUE Buspar 15 mg BID for anxiety  - CONTINUE Doxepin 10 mg QHS for insomnia  - CONTINUE Topamax 50 mg QDAY for EtOH use disorder  - START Lexapro 10 mg QDAY for depression  - Consider mood stabilizer  - IGM therapy as tolerated  - Expand database / obtain collateral  - Dispo planning (rehab vs CSU)           A coordinated, multidisplinary treatment team (includes the nurse, unit pharmacist,  and writer) round was conducted for this initial evaluation with the patient present. The following regarding medications was addressed during rounds with patient: the risks and benefits of the proposed medication. The patient was given the opportunity to ask questions. Informed consent given to the use of the above medications. I will continue to adjust psychiatric and non-psychiatric medications (see above \"medication\" section and orders section for details) as deemed appropriate & based upon diagnoses and response to treatment. I have reviewed admission (and previous/old) labs and medical tests in the EHR and or transferring hospital documents. I will continue to order blood tests/labs and diagnostic tests as deemed appropriate and review results as they become available (see orders for details). I have reviewed old psychiatric and medical records available in the EHR. I Will order additional psychiatric records from other institutions to further elucidate the nature of patient's psychopathology and review once available. I will gather additional collateral information from friends, family and o/p treatment team to further elucidate the nature of patient's psychopathology and baselline level of psychiatric functioning. I certify that this patient's inpatient psychiatric hospital services are required for treatment that could reasonably be expected to improve the patient's condition, or for diagnostic study, and that the patient continues to need, on a daily basis, active treatment furnished directly by or requiring the supervision of inpatient psychiatric facility personnel. In addition, the hospital records show that services furnished were intensive treatment services, admission or related services, or equivalent services.       ESTIMATED LENGTH OF STAY:  TBD                          SIGNED:    Sulaiman Llamas MD  3/6/2022

## 2022-03-07 NOTE — BH NOTES
1600-Patient was evaluated by crisis  Counselor Sissy Bah  Who stated that the patient does not meet Criteria for  TOD  Because he Is not a danger to Himself  Or other and  He has capacity to make his own decision  . Dr Canelo Brian was notified  And stated to let the patient go AMA at this time. It was explained to the patient that he can go AM and would be responsible for his own care and medication. He stated it was ok and he would be allright.   Patient

## 2022-03-08 NOTE — BH NOTES
Behavioral Health Transition Record to Provider    Patient Name: Meghan Matos  YOB: 1990  Medical Record Number: [id number]  Date of Admission: 3/5/2022  Date of Discharge: 3/8/2022    Attending Provider: Discharging Provider: To contact this individual call [phone] and ask the  to page. If unavailable, ask to be transferred to [facility] Provider on call. Madi Rivera Provider will be available on call 24/7 and during holidays. Primary Care Provider: None    No Known Allergies    Reason for Admission: Anxiety and Depression     Ad[facility] Diagnosis: [facility] [F31.9]    * No surgery found *    Results for orders placed or performed during the hospital encounter of 03/05/22   COVID-19 WITH INFLUENZA A/B   Result Value Ref Range    SARS-CoV-2 Not detected NOTD      Influenza A by PCR Not detected      Influenza B by PCR Not detected     CBC WITH AUTOMATED DIFF   Result Value Ref Range    WBC 15.5 (H) 4.1 - 11.1 K/uL    RBC 4.70 4. 10 - 5.70 M/uL    HGB 12.9 12.1 - 17.0 g/dL    HCT 39.6 36.6 - 50.3 %    MCV 84.3 80.0 - 99.0 FL    MCH 27.4 26.0 - 34.0 PG    MCHC 32.6 30.0 - 36.5 g/dL    RDW 13.2 11.5 - 14.5 %    PLATELET 823 810 - 610 K/uL    MPV 8.8 (L) 8.9 - 12.9 FL    NRBC 0.0 0  WBC    ABSOLUTE NRBC 0.00 0.00 - 0.01 K/uL    NEUTROPHILS 68 32 - 75 %    LYMPHOCYTES 18 12 - 49 %    MONOCYTES 12 5 - 13 %    EOSINOPHILS 0 0 - 7 %    BASOPHILS 1 0 - 1 %    IMMATURE GRANULOCYTES 1 (H) 0.0 - 0.5 %    ABS. NEUTROPHILS 10.8 (H) 1.8 - 8.0 K/UL    ABS. LYMPHOCYTES 2.7 0.8 - 3.5 K/UL    ABS. MONOCYTES 1.8 (H) 0.0 - 1.0 K/UL    ABS. EOSINOPHILS 0.0 0.0 - 0.4 K/UL    ABS. BASOPHILS 0.1 0.0 - 0.1 K/UL    ABS. IMM.  GRANS. 0.1 (H) 0.00 - 0.04 K/UL    DF AUTOMATED     METABOLIC PANEL, COMPREHENSIVE   Result Value Ref Range    Sodium 140 136 - 145 mmol/L    Potassium 3.7 3.5 - 5.1 mmol/L    Chloride 103 97 - 108 mmol/L    CO2 24 21 - 32 mmol/L    Anion gap 13 5 - 15 mmol/L    Glucose 97 65 - 100 mg/dL    BUN 11 6 - 20 MG/DL    Creatinine 0.80 0.70 - 1.30 MG/DL    BUN/Creatinine ratio 14 12 - 20      GFR est AA >60 >60 ml/min/1.73m2    GFR est non-AA >60 >60 ml/min/1.73m2    Calcium 8.8 8.5 - 10.1 MG/DL    Bilirubin, total 0.7 0.2 - 1.0 MG/DL    ALT (SGPT) 32 12 - 78 U/L    AST (SGOT) 53 (H) 15 - 37 U/L    Alk. phosphatase 99 45 - 117 U/L    Protein, total 7.5 6.4 - 8.2 g/dL    Albumin 4.6 3.5 - 5.0 g/dL    Globulin 2.9 2.0 - 4.0 g/dL    A-G Ratio 1.6 1.1 - 2.2     URINALYSIS W/ RFLX MICROSCOPIC   Result Value Ref Range    Color YELLOW/STRAW      Appearance CLEAR CLEAR      Specific gravity 1.020 1.003 - 1.030      pH (UA) 6.0 5.0 - 8.0      Protein Negative NEG mg/dL    Glucose Negative NEG mg/dL    Ketone 15 (A) NEG mg/dL    Bilirubin Negative NEG      Blood Negative NEG      Urobilinogen 0.2 0.2 - 1.0 EU/dL    Nitrites Negative NEG      Leukocyte Esterase Negative NEG     DRUG SCREEN, URINE   Result Value Ref Range    AMPHETAMINES Negative NEG      BARBITURATES Negative NEG      BENZODIAZEPINES Negative NEG      COCAINE Negative NEG      METHADONE Negative NEG      OPIATES Negative NEG      PCP(PHENCYCLIDINE) Negative NEG      THC (TH-CANNABINOL) Positive (A) NEG      Drug screen comment (NOTE)    ETHYL ALCOHOL   Result Value Ref Range    ALCOHOL(ETHYL),SERUM <10 <10 MG/DL       Immunizations administered during this encounter:   Immunization History   Administered Date(s) Administered    COVID-19, J&J, PF, 0.5 mL Dose 02/21/2022       Screening for Metabolic Disorders for Patients on Antipsychotic Medications  (Data obtained from the EMR)    Estimated Body Mass Index  Estimated body mass index is 26.83 kg/m² as calculated from the following:    Height as of this encounter: 6' 2\" (1.88 m). Weight as of this encounter: 94.8 kg (209 lb).      Vital Signs/Blood Pressure  Visit Vitals  /75   Pulse 86   Temp 96.8 °F (36 °C)   Resp 16   Ht 6' 2\" (1.88 m)   Wt 94.8 kg (209 lb)   SpO2 99%   BMI 26.83 kg/m²       Blood Glucose/Hemoglobin A1c  Lab Results   Component Value Date/Time    Glucose 97 03/05/2022 01:04 AM       Lab Results   Component Value Date/Time    Hemoglobin A1c 5.7 (H) 02/22/2022 06:05 AM        Lipid Panel  Lab Results   Component Value Date/Time    Cholesterol, total 164 02/22/2022 06:05 AM    HDL Cholesterol 38 02/22/2022 06:05 AM    LDL, calculated 104.4 (H) 02/22/2022 06:05 AM    Triglyceride 108 02/22/2022 06:05 AM    CHOL/HDL Ratio 4.3 02/22/2022 06:05 AM        Discharge Diagnosis: Please refer to physician's discharge summary. Discharge Plan:   The patient Bri Ordoñez exhibits the ability to control behavior in a less restrictive environment. Patient's level of functioning is improving. No assaultive/destructive behavior has been observed for the past 24 hours. No suicidal/homicidal threat or behavior has been observed for the past 24 hours. There is no evidence of serious medication side effects. Patient has not been in physical or protective restraints for at least the past 24 hours. Discharge Medication List and Instructions:   Discharge Medication List as of 3/7/2022  4:58 PM      CONTINUE these medications which have NOT CHANGED    Details   doxepin (SINEquan) 10 mg capsule Take 1 Capsule by mouth nightly. Indications: insomnia, Normal, Disp-30 Capsule, R-1      risperiDONE (RisperDAL) 1 mg tablet Take 1 Tablet by mouth two (2) times a day for 40 doses. Indications: doni associated with bipolar disorder, Normal, Disp-40 Tablet, R-0      risperiDONE microspheres (RisperDAL consta) 25 mg/2 mL injection 2 mL by IntraMUSCular route Once every 2 weeks. Administer on March 9, 2022  Indications: bipolar disorder, Normal, Disp-1 Each, R-0      busPIRone (BUSPAR) 30 mg tablet Take 1 Tablet by mouth two (2) times a day.  Indications: repeated episodes of anxiety, Normal, Disp-60 Tablet, R-1      traZODone (DESYREL) 50 mg tablet Take 1 Tablet by mouth nightly as needed (For insomnia). Indications: insomnia associated with depression, Normal, Disp-30 Tablet, R-1      cloNIDine HCL (CATAPRES) 0.1 mg tablet Take 1 Tablet by mouth three (3) times daily as needed for Restlessness. Indications: attention deficit disorder with hyperactivity, Normal, Disp-90 Tablet, R-1      hydrOXYzine HCL (ATARAX) 50 mg tablet Take 1 Tablet by mouth two (2) times daily as needed for Anxiety. Indications: anxious, Normal, Disp-60 Tablet, R-1      topiramate (TOPAMAX) 50 mg tablet Take 1 Tablet by mouth daily. Indications: alcoholism, Normal, Disp-30 Tablet, R-1             Unresulted Labs (24h ago, onward)            None        To obtain results of studies pending at discharge, please contact N/A. Follow-up Information     Follow up With Specialties Details Why [address] [place] [address]  Schedule an appointment as soon as possible for a visit Please follow up with your  Chela Nino and Dr. Nelson Sharp for medication management as soon as possible. [address]  [phone]  Fax: [phone]  Crisis Line: [phone]      None    None (395) Patient stated that they have no PCP            Advanced Directive:   Does the patient have an appointed surrogate decision maker? Unknown   Does the patient have a Medical Advance Directive? Unknown   Does the patient have a Psychiatric Advance Directive? Unknown   If the patient does not have a surrogate or Medical Advance Directive AND Psychiatric Advance Directive, the patient was offered information on these advance directives. Unknown     Patient Instructions: Please continue all medications until otherwise directed by physician. Tobacco Cessation Discharge Plan:   Is the patient a smoker and needs referral for smoking cessation? No  Patient referred to the following for smoking cessation with an appointment?  No   Patient was offered medication to assist with smoking cessation at discharge? No  Was education for smoking cessation added to the discharge instructions? No     Alcohol/Substance Abuse Discharge Plan:   Does the patient have a history of substance/alcohol abuse and requires a referral for treatment? Yes  Patient referred to the following for substance/alcohol abuse treatment with an appointment? Yes  Patient was offered medication to assist with alcohol cessation at discharge? No  Was education for substance/alcohol abuse added to discharge instructions? Yes     Patient discharged to Home; provided to the patient/caregiver either in hard copy or electronically. Continuing care paperwork was faxed to community mental health providers.

## 2022-03-08 NOTE — DISCHARGE SUMMARY
PSYCHIATRIC DISCHARGE SUMMARY         IDENTIFICATION:    Patient Name  Jinny Valentin   Date of Birth 1990   [facility] [id number]   Medical Record Number  [id number]      Age  27 y.o. PCP None   Admit date:  3/5/2022    Discharge date: 3/7/2022   Room Number  019/50  @ [facility]   Date of Service  3/7/2022            TYPE OF DISCHARGE: AMA                CONDITION AT DISCHARGE: improved and fair       PROVISIONAL & DISCHARGE DIAGNOSES:    Problem List  Never Reviewed          Codes Class    Bipolar 1 disorder ([facility] [address] ICD-10-CM: F31.9  ICD-9-CM: 296.7         Cannabis use disorder, moderate, dependence ([facility] [address] ICD-10-CM: F12.20  ICD-9-CM: 304.30         * (Principal) Bipolar disorder, current episode manic severe with psychotic features ([facility] [address] ICD-10-CM: F31.2  ICD-9-CM: 296.44         Adjustment disorder with disturbance of conduct ICD-10-CM: F43.24  ICD-9-CM: 309.3               Active Hospital Problems    Cannabis use disorder, moderate, dependence ([facility] [address]      *Bipolar disorder, current episode manic severe with psychotic features ([facility] [address]        DISCHARGE DIAGNOSIS:   Axis I:  SEE ABOVE  Axis II: SEE ABOVE  Axis III: SEE ABOVE  Axis IV:  lack of structure  Axis V:  20 on admission, 50 on discharge     CC & HISTORY OF PRESENT ILLNESS:  \"doni\"    The patient, Jinny Valentin, is a 27 y.o. WHITE/NON- male with a past psychiatric history significant for bipolar disorder and cannabis use disorder, who presents at this time with complaints of (and/or evidence of) the following emotional symptoms: agitation, delusions and psychotic behavior. Additional symptomatology include poor self care. The above symptoms have been present for 2+ weeks. These symptoms are of moderate to high severity. These symptoms are constant in nature. The patient's condition has been precipitated by psychosocial stressors. Patient's condition made worse by continued illicit drug use as well as treatment noncompliance. UDS: +THC; BAL=0. Patient recent discharged from the hospital; at the time he acknowledged disorganization and agitation and home, agreed to getting a BOWEN and was sent home with instructions to continue his antipsychotic. Patient states that he did not follow up with medication and resumed using THC, and has been getting into escalating arguments with his family. Patient now sleeping outside and walking extensively throughout the day, resulting in sores along his feet. The patient is a fair historian. The patient corroborates the above narrative. The patient contracts for safety on the unit and gives consent for the team to contact collateral. The patient is amenable to initiating treatment while on the unit. He is amenable to rehab placement. 03/06 - the patient slept 8.5 hours overnight; he got Tylenol and Atarax PRN. Patient reports feeling depressed, and continues to c/o pain his feet. He is open to starting a mood agent but asks about benzodiazepines, which is not on the table. Patient denies SI/HI/AVH/PI.    03/07 - the patient denies all symptoms, and is visible with no new episodes of agitation overnight. He slept 7.5 hours overnight and is otherwise in fair behavioral control.  Patient medication compliant but endorses depression; he is ambivalent about attending rehab       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    Drug use: Yes     Types: Marijuana, Heroin, Cocaine     Comment: pt states last use 1-1.5 yrs    Sexual activity: Not Currently   Other Topics Concern    Not on file   Social History Narrative    ** Merged History Encounter **          Social Determinants of Health     Financial Resource Strain:     Difficulty of Paying Living Expenses: Not on file   Food Insecurity:     Worried About Running Out of Food in the Last Year: Not on file    Edgard of Food in the Last Year: Not on file   Transportation Needs:     Lack of Transportation (Medical): Not on file    Lack of Transportation (Non-Medical): Not on file   Physical Activity:     Days of Exercise per Week: Not on file    Minutes of Exercise per Session: Not on file   Stress:     Feeling of Stress : Not on file   Social Connections:     Frequency of Communication with Friends and Family: Not on file    Frequency of Social Gatherings with Friends and Family: Not on file    Attends Bahai Services: Not on file    Active Member of [address] [place] [id number] [employer] or Organizations: Not on file    Attends Club or Organization Meetings: Not on file    Marital Status: Not on file   Intimate Partner Violence:     Fear of Current or Ex-Partner: Not on file    Emotionally Abused: Not on file    Physically Abused: Not on file    Sexually Abused: Not on file   Housing Stability:     Unable to Pay for Housing in the Last Year: Not on file    Number of [place] in the Last Year: Not on file    Unstable Housing in the Last Year: Not on file      FAMILY HISTORY:   No family history on file. HOSPITALIZATION COURSE:    Brissa Manning was admitted to the inpatient psychiatric unit [facility] for acute psychiatric stabilization in regards to symptomatology as described in the HPI above. The differential diagnosis at time of admission included: schizoaffective vs bipolar disorder. While on the unit Brissa Manning was involved in individual, group, occupational and milieu therapy.   Psychiatric medications were adjusted during this hospitalization including Bobby Yu demonstrated a slow, but progressive improvement in overall condition. Much of patient's initial presentation appeared to be related to situational stressors, effects of medication non-compliance, drugs of abuse and psychological factors. Please see individual progress notes for more specific details regarding patient's hospitalization course. Patient with request for discharge today. There are no grounds to seek a TDO. Patient's family contacted to discuss the patient's stated desire to leave against medical advice but were not reachable. The patient was scheduled to get a BOWEN in two days but declined to stay for this medication. At time of discharge, Desi Warren is without significant problems of depression, psychosis, or doni. Patient free of suicidal and homicidal ideations (appears to be at very low risk of suicide or homicide) and reports many positive predictive factors in terms of not attempting suicide or homicide. Overall presentation at time of discharge is most consistent with the diagnosis of bipolar disorder. Patient has maximized benefit to be derived from acute inpatient psychiatric treatment. All members of the treatment team concur with each other in regards to plans for discharge today following a discussion of the leaving the hospital against medical advice. Patient and family are aware and in agreement with discharge and discharge plan. LABS AND IMAGAING:    Labs Reviewed   CBC WITH AUTOMATED DIFF - Abnormal; Notable for the following components:       Result Value    WBC 15.5 (*)     MPV 8.8 (*)     IMMATURE GRANULOCYTES 1 (*)     ABS. NEUTROPHILS 10.8 (*)     ABS. MONOCYTES 1.8 (*)     ABS. IMM.  GRANS. 0.1 (*)     All other components within normal limits   METABOLIC PANEL, COMPREHENSIVE - Abnormal; Notable for the following components:    AST (SGOT) 53 (*)     All other components within normal limits   URINALYSIS W/ RFLX MICROSCOPIC - Abnormal; Notable for the following components:    Ketone 15 (*)     All other components within normal limits   DRUG SCREEN, URINE - Abnormal; Notable for the following components:    THC (TH-CANNABINOL) Positive (*)     All other components within normal limits   COVID-19 WITH INFLUENZA A/B   ETHYL ALCOHOL     No results found for: DS35, PHEN, PHENO, PHENT, DILF, DS39, PHENY, PTN, VALF2, VALAC, VALP, VALPR, DS6, CRBAM, CRBAMP, CARB2, XCRBAM  Admission on 03/05/2022, Discharged on 03/07/2022   Component Date Value Ref Range Status    SARS-CoV-2 03/05/2022 Not detected  NOTD   Final    Influenza A by PCR 03/05/2022 Not detected    Final    Influenza B by PCR 03/05/2022 Not detected    Final    WBC 03/05/2022 15.5* 4.1 - 11.1 K/uL Final    RBC 03/05/2022 4.70  4. 10 - 5.70 M/uL Final    HGB 03/05/2022 12.9  12.1 - 17.0 g/dL Final    HCT 03/05/2022 39.6  36.6 - 50.3 % Final    MCV 03/05/2022 84.3  80.0 - 99.0 FL Final    MCH 03/05/2022 27.4  26.0 - 34.0 PG Final    MCHC 03/05/2022 32.6  30.0 - 36.5 g/dL Final    RDW 03/05/2022 13.2  11.5 - 14.5 % Final    PLATELET 20/64/8321 333  150 - 400 K/uL Final    MPV 03/05/2022 8.8* 8.9 - 12.9 FL Final    NRBC 03/05/2022 0.0  0  WBC Final    ABSOLUTE NRBC 03/05/2022 0.00  0.00 - 0.01 K/uL Final    NEUTROPHILS 03/05/2022 68  32 - 75 % Final    LYMPHOCYTES 03/05/2022 18  12 - 49 % Final    MONOCYTES 03/05/2022 12  5 - 13 % Final    EOSINOPHILS 03/05/2022 0  0 - 7 % Final    BASOPHILS 03/05/2022 1  0 - 1 % Final    IMMATURE GRANULOCYTES 03/05/2022 1* 0.0 - 0.5 % Final    ABS. NEUTROPHILS 03/05/2022 10.8* 1.8 - 8.0 K/UL Final    ABS. LYMPHOCYTES 03/05/2022 2.7  0.8 - 3.5 K/UL Final    ABS. MONOCYTES 03/05/2022 1.8* 0.0 - 1.0 K/UL Final    ABS. EOSINOPHILS 03/05/2022 0.0  0.0 - 0.4 K/UL Final    ABS. BASOPHILS 03/05/2022 0.1  0.0 - 0.1 K/UL Final    ABS. IMM.  GRANS. 03/05/2022 0.1* 0.00 - 0.04 K/UL Final    DF 03/05/2022 AUTOMATED    Final    Sodium 03/05/2022 140  136 - 145 mmol/L Final    Potassium 03/05/2022 3.7  3.5 - 5.1 mmol/L Final    Chloride 03/05/2022 103  97 - 108 mmol/L Final    CO2 03/05/2022 24  21 - 32 mmol/L Final    Anion gap 03/05/2022 13  5 - 15 mmol/L Final    Glucose 03/05/2022 97  65 - 100 mg/dL Final    BUN 03/05/2022 11  6 - 20 MG/DL Final    Creatinine 03/05/2022 0.80  0.70 - 1.30 MG/DL Final    BUN/Creatinine ratio 03/05/2022 14  12 - 20   Final    GFR est AA 03/05/2022 >60  >60 ml/min/1.73m2 Final    GFR est non-AA 03/05/2022 >60  >60 ml/min/1.73m2 Final    Calcium 03/05/2022 8.8  8.5 - 10.1 MG/DL Final    Bilirubin, total 03/05/2022 0.7  0.2 - 1.0 MG/DL Final    ALT (SGPT) 03/05/2022 32  12 - 78 U/L Final    AST (SGOT) 03/05/2022 53* 15 - 37 U/L Final    Alk.  phosphatase 03/05/2022 99  45 - 117 U/L Final    Protein, total 03/05/2022 7.5  6.4 - 8.2 g/dL Final    Albumin 03/05/2022 4.6  3.5 - 5.0 g/dL Final    Globulin 03/05/2022 2.9  2.0 - 4.0 g/dL Final    A-G Ratio 03/05/2022 1.6  1.1 - 2.2   Final    Color 03/05/2022 YELLOW/STRAW    Final    Appearance 03/05/2022 CLEAR  CLEAR   Final    Specific gravity 03/05/2022 1.020  1.003 - 1.030   Final    pH (UA) 03/05/2022 6.0  5.0 - 8.0   Final    Protein 03/05/2022 Negative  NEG mg/dL Final    Glucose 03/05/2022 Negative  NEG mg/dL Final    Ketone 03/05/2022 15* NEG mg/dL Final    Bilirubin 03/05/2022 Negative  NEG   Final    Blood 03/05/2022 Negative  NEG   Final    Urobilinogen 03/05/2022 0.2  0.2 - 1.0 EU/dL Final    Nitrites 03/05/2022 Negative  NEG   Final    Leukocyte Esterase 03/05/2022 Negative  NEG   Final    AMPHETAMINES 03/05/2022 Negative  NEG   Final    BARBITURATES 03/05/2022 Negative  NEG   Final    BENZODIAZEPINES 03/05/2022 Negative  NEG   Final    COCAINE 03/05/2022 Negative  NEG   Final    METHADONE 03/05/2022 Negative  NEG   Final    OPIATES 03/05/2022 Negative  NEG   Final    PCP(PHENCYCLIDINE) 03/05/2022 Negative  NEG Final    THC (TH-CANNABINOL) 03/05/2022 Positive* NEG   Final    Drug screen comment 03/05/2022 (NOTE)   Final    ALCOHOL(ETHYL),SERUM 03/05/2022 <10  <10 MG/DL Final   Admission on 02/18/2022, Discharged on 02/24/2022   Component Date Value Ref Range Status    WBC 02/18/2022 12.2* 4.1 - 11.1 K/uL Final    RBC 02/18/2022 5.37  4.10 - 5.70 M/uL Final    HGB 02/18/2022 14.8  12.1 - 17.0 g/dL Final    HCT 02/18/2022 45.2  36.6 - 50.3 % Final    MCV 02/18/2022 84.2  80.0 - 99.0 FL Final    MCH 02/18/2022 27.6  26.0 - 34.0 PG Final    MCHC 02/18/2022 32.7  30.0 - 36.5 g/dL Final    RDW 02/18/2022 13.2  11.5 - 14.5 % Final    PLATELET 41/71/6971 155  150 - 400 K/uL Final    MPV 02/18/2022 9.4  8.9 - 12.9 FL Final    NRBC 02/18/2022 0.0  0  WBC Final    ABSOLUTE NRBC 02/18/2022 0.00  0.00 - 0.01 K/uL Final    NEUTROPHILS 02/18/2022 77* 32 - 75 % Final    LYMPHOCYTES 02/18/2022 17  12 - 49 % Final    MONOCYTES 02/18/2022 6  5 - 13 % Final    EOSINOPHILS 02/18/2022 0  0 - 7 % Final    BASOPHILS 02/18/2022 0  0 - 1 % Final    IMMATURE GRANULOCYTES 02/18/2022 0  0.0 - 0.5 % Final    ABS. NEUTROPHILS 02/18/2022 9.3* 1.8 - 8.0 K/UL Final    ABS. LYMPHOCYTES 02/18/2022 2.1  0.8 - 3.5 K/UL Final    ABS. MONOCYTES 02/18/2022 0.7  0.0 - 1.0 K/UL Final    ABS. EOSINOPHILS 02/18/2022 0.0  0.0 - 0.4 K/UL Final    ABS. BASOPHILS 02/18/2022 0.1  0.0 - 0.1 K/UL Final    ABS. IMM.  GRANS. 02/18/2022 0.1* 0.00 - 0.04 K/UL Final    DF 02/18/2022 AUTOMATED    Final    Sodium 02/18/2022 144  136 - 145 mmol/L Final    Potassium 02/18/2022 3.7  3.5 - 5.1 mmol/L Final    Chloride 02/18/2022 105  97 - 108 mmol/L Final    CO2 02/18/2022 21  21 - 32 mmol/L Final    Anion gap 02/18/2022 18* 5 - 15 mmol/L Final    Glucose 02/18/2022 91  65 - 100 mg/dL Final    BUN 02/18/2022 16  6 - 20 MG/DL Final    Creatinine 02/18/2022 1.02  0.70 - 1.30 MG/DL Final    BUN/Creatinine ratio 02/18/2022 16  12 - 20   Final    GFR est AA 02/18/2022 >60  >60 ml/min/1.73m2 Final    GFR est non-AA 02/18/2022 >60  >60 ml/min/1.73m2 Final    Calcium 02/18/2022 9.1  8.5 - 10.1 MG/DL Final    Bilirubin, total 02/18/2022 0.7  0.2 - 1.0 MG/DL Final    ALT (SGPT) 02/18/2022 17  12 - 78 U/L Final    AST (SGOT) 02/18/2022 16  15 - 37 U/L Final    Alk. phosphatase 02/18/2022 85  45 - 117 U/L Final    Protein, total 02/18/2022 8.5* 6.4 - 8.2 g/dL Final    Albumin 02/18/2022 5.3* 3.5 - 5.0 g/dL Final    Globulin 02/18/2022 3.2  2.0 - 4.0 g/dL Final    A-G Ratio 02/18/2022 1.7  1.1 - 2.2   Final    ALCOHOL(ETHYL),SERUM 02/18/2022 <10  <10 MG/DL Final    AMPHETAMINES 02/18/2022 Negative  NEG   Final    BARBITURATES 02/18/2022 Negative  NEG   Final    BENZODIAZEPINES 02/18/2022 Negative  NEG   Final    COCAINE 02/18/2022 Negative  NEG   Final    METHADONE 02/18/2022 Negative  NEG   Final    OPIATES 02/18/2022 Negative  NEG   Final    PCP(PHENCYCLIDINE) 02/18/2022 Negative  NEG   Final    THC (TH-CANNABINOL) 02/18/2022 Positive* NEG   Final    Drug screen comment 02/18/2022 (NOTE)   Final    SARS-CoV-2 02/18/2022 Not detected  NOTD   Final    Influenza A by PCR 02/18/2022 Not detected    Final    Influenza B by PCR 02/18/2022 Not detected    Final    Cholesterol, total 02/22/2022 164  <200 MG/DL Final    Triglyceride 02/22/2022 108  <150 MG/DL Final    HDL Cholesterol 02/22/2022 38  MG/DL Final    LDL, calculated 02/22/2022 104.4* 0 - 100 MG/DL Final    VLDL, calculated 02/22/2022 21.6  MG/DL Final    CHOL/HDL Ratio 02/22/2022 4.3  0.0 - 5.0   Final    Hemoglobin A1c 02/22/2022 5.7* 4.0 - 5.6 % Final    Est. average glucose 02/22/2022 117  mg/dL Final    TSH 02/22/2022 1.81  0.36 - 3.74 uIU/mL Final     No results found. DISPOSITION:    Home. Patient to f/u with psychiatric and psychotherapy appointments. Patient is to f/u with internist as directed.                FOLLOW-UP CARE:    Activity as tolerated  Regular diet  Wound Care: none needed. Follow-up Information     Follow up With Specialties Details Why [address] [facility]  Schedule an appointment as soon as possible for a visit Please follow up with your  Barbara Patel and Dr. Cesario Bonner for medication management as soon as possible. [address] Final  [phone]  Fax: [phone]  Crisis Line: [phone]      None    None ([phone]) Patient stated that they have no PCP                   PROGNOSIS:   Poor---- based on nature of patient's pathology/ies and treatment compliance issues. Prognosis is greatly dependent upon patient's ability to remain sober and to follow up with scheduled appointments as well as to comply with psychiatric medications as prescribed. DISCHARGE MEDICATIONS: (no changes made). Informed consent given for the use of following psychotropic medications:  Discharge Medication List as of 3/7/2022  4:58 PM      CONTINUE these medications which have NOT CHANGED    Details   doxepin (SINEquan) 10 mg capsule Take 1 Capsule by mouth nightly. Indications: insomnia, Normal, Disp-30 Capsule, R-1      risperiDONE (RisperDAL) 1 mg tablet Take 1 Tablet by mouth two (2) times a day for 40 doses. Indications: doni associated with bipolar disorder, Normal, Disp-40 Tablet, R-0      risperiDONE microspheres (RisperDAL consta) 25 mg/2 mL injection 2 mL by IntraMUSCular route Once every 2 weeks. Administer on March 9, 2022  Indications: bipolar disorder, Normal, Disp-1 Each, R-0      busPIRone (BUSPAR) 30 mg tablet Take 1 Tablet by mouth two (2) times a day. Indications: repeated episodes of anxiety, Normal, Disp-60 Tablet, R-1      traZODone (DESYREL) 50 mg tablet Take 1 Tablet by mouth nightly as needed (For insomnia).  Indications: insomnia associated with depression, Normal, Disp-30 Tablet, R-1      cloNIDine HCL (CATAPRES) 0.1 mg tablet Take 1 Tablet by mouth three (3) times daily as needed for Restlessness. Indications: attention deficit disorder with hyperactivity, Normal, Disp-90 Tablet, R-1      hydrOXYzine HCL (ATARAX) 50 mg tablet Take 1 Tablet by mouth two (2) times daily as needed for Anxiety. Indications: anxious, Normal, Disp-60 Tablet, R-1      topiramate (TOPAMAX) 50 mg tablet Take 1 Tablet by mouth daily. Indications: alcoholism, Normal, Disp-30 Tablet, R-1                    A coordinated, multidisplinary treatment team round was conducted with Chon Ache is done daily here at [facility]. This team consists of the nurse, psychiatric unit pharmacist,  and Lela Caraballo. I have spent greater than 35 minutes on discharge work.     Signed:  Mack Rosales MD  3/7/2022

## 2022-03-18 PROBLEM — F31.2 BIPOLAR DISORDER, CURRENT EPISODE MANIC SEVERE WITH PSYCHOTIC FEATURES (HCC): Status: ACTIVE | Noted: 2022-02-19

## 2022-03-18 PROBLEM — F43.24 ADJUSTMENT DISORDER WITH DISTURBANCE OF CONDUCT: Status: ACTIVE | Noted: 2021-12-15

## 2022-03-20 PROBLEM — F31.9 BIPOLAR 1 DISORDER (HCC): Status: ACTIVE | Noted: 2022-03-05

## 2022-03-20 PROBLEM — F12.20 CANNABIS USE DISORDER, MODERATE, DEPENDENCE (HCC): Status: ACTIVE | Noted: 2022-03-05

## 2022-07-29 ENCOUNTER — OFFICE VISIT (OUTPATIENT)
Dept: FAMILY MEDICINE CLINIC | Age: 32
End: 2022-07-29
Payer: MEDICARE

## 2022-07-29 VITALS
WEIGHT: 186 LBS | HEART RATE: 77 BPM | DIASTOLIC BLOOD PRESSURE: 61 MMHG | TEMPERATURE: 97.1 F | BODY MASS INDEX: 23.88 KG/M2 | OXYGEN SATURATION: 97 % | SYSTOLIC BLOOD PRESSURE: 98 MMHG

## 2022-07-29 DIAGNOSIS — Z11.59 ENCOUNTER FOR HEPATITIS C SCREENING TEST FOR LOW RISK PATIENT: ICD-10-CM

## 2022-07-29 DIAGNOSIS — R73.03 PREDIABETES: ICD-10-CM

## 2022-07-29 DIAGNOSIS — F31.9 BIPOLAR 1 DISORDER (HCC): Primary | ICD-10-CM

## 2022-07-29 DIAGNOSIS — R79.89 OTHER SPECIFIED ABNORMAL FINDINGS OF BLOOD CHEMISTRY: ICD-10-CM

## 2022-07-29 DIAGNOSIS — Z11.4 SCREENING FOR HIV (HUMAN IMMUNODEFICIENCY VIRUS): ICD-10-CM

## 2022-07-29 DIAGNOSIS — Z02.83 ENCOUNTER FOR DRUG SCREENING: ICD-10-CM

## 2022-07-29 DIAGNOSIS — Z00.00 ENCOUNTER FOR MEDICAL EXAMINATION TO ESTABLISH CARE: ICD-10-CM

## 2022-07-29 LAB
ALBUMIN SERPL-MCNC: 4.1 G/DL (ref 3.5–5)
ALBUMIN/GLOB SERPL: 1.4 {RATIO} (ref 1.1–2.2)
ALP SERPL-CCNC: 73 U/L (ref 45–117)
ALT SERPL-CCNC: 17 U/L (ref 12–78)
ANION GAP SERPL CALC-SCNC: 7 MMOL/L (ref 5–15)
AST SERPL-CCNC: 9 U/L (ref 15–37)
BILIRUB SERPL-MCNC: 0.4 MG/DL (ref 0.2–1)
BUN SERPL-MCNC: 10 MG/DL (ref 6–20)
BUN/CREAT SERPL: 10 (ref 12–20)
CALCIUM SERPL-MCNC: 9.2 MG/DL (ref 8.5–10.1)
CHLORIDE SERPL-SCNC: 105 MMOL/L (ref 97–108)
CHOLEST SERPL-MCNC: 189 MG/DL
CO2 SERPL-SCNC: 30 MMOL/L (ref 21–32)
CREAT SERPL-MCNC: 0.99 MG/DL (ref 0.7–1.3)
ERYTHROCYTE [DISTWIDTH] IN BLOOD BY AUTOMATED COUNT: 12.9 % (ref 11.5–14.5)
EST. AVERAGE GLUCOSE BLD GHB EST-MCNC: 111 MG/DL
GLOBULIN SER CALC-MCNC: 2.9 G/DL (ref 2–4)
GLUCOSE SERPL-MCNC: 89 MG/DL (ref 65–100)
HBA1C MFR BLD: 5.5 % (ref 4–5.6)
HCT VFR BLD AUTO: 42.4 % (ref 36.6–50.3)
HCV AB SERPL QL IA: NONREACTIVE
HDLC SERPL-MCNC: 64 MG/DL
HDLC SERPL: 3 {RATIO} (ref 0–5)
HGB BLD-MCNC: 13.9 G/DL (ref 12.1–17)
HIV 1+2 AB+HIV1 P24 AG SERPL QL IA: NONREACTIVE
HIV12 RESULT COMMENT, HHIVC: NORMAL
LDLC SERPL CALC-MCNC: 113.4 MG/DL (ref 0–100)
MCH RBC QN AUTO: 28.3 PG (ref 26–34)
MCHC RBC AUTO-ENTMCNC: 32.8 G/DL (ref 30–36.5)
MCV RBC AUTO: 86.4 FL (ref 80–99)
NRBC # BLD: 0 K/UL (ref 0–0.01)
NRBC BLD-RTO: 0 PER 100 WBC
PLATELET # BLD AUTO: 282 K/UL (ref 150–400)
PMV BLD AUTO: 9.5 FL (ref 8.9–12.9)
POTASSIUM SERPL-SCNC: 4.4 MMOL/L (ref 3.5–5.1)
PROT SERPL-MCNC: 7 G/DL (ref 6.4–8.2)
RBC # BLD AUTO: 4.91 M/UL (ref 4.1–5.7)
SODIUM SERPL-SCNC: 142 MMOL/L (ref 136–145)
TRIGL SERPL-MCNC: 58 MG/DL (ref ?–150)
TSH SERPL DL<=0.05 MIU/L-ACNC: 0.63 UIU/ML (ref 0.36–3.74)
VLDLC SERPL CALC-MCNC: 11.6 MG/DL
WBC # BLD AUTO: 6 K/UL (ref 4.1–11.1)

## 2022-07-29 PROCEDURE — 99204 OFFICE O/P NEW MOD 45 MIN: CPT | Performed by: STUDENT IN AN ORGANIZED HEALTH CARE EDUCATION/TRAINING PROGRAM

## 2022-07-29 NOTE — PROGRESS NOTES
Alton Durán is an 27 y.o. male who presents to [facility] care   Patient was previously receiving care at: no PCP; [address] - sees Dr. Sailaja Le every 1-3 months. Medical history significant for: Bipolar - last hospitalized for doni in March 2022. Had his license taken away because of a DUI for being on heroin in 2018. Not drinking or doing any illicit substances now. Requesting a Med2 DMV form filled out to get his license back. Of note, patient lives with his parents, but does not have a guardian. Currently not having any complaints. Review of Systems   General/Constitutional:   No headache, fever, fatigue, weight loss or weight gain       Eyes:   No redness, pruritis, pain, visual changes, swelling, or discharge      Ears:    No pain, loss or changes in hearing     Neck:   No swelling, masses, stiffness, pain, or limited movement     Cardiac:    No chest pain      Respiratory:   No cough or shortness of breath     GI:   No nausea/vomiting, diarrhea, abdominal pain, bloody or dark stools       :   No dysuria or  hematuria    Neurological:   No loss of consciousness, dizziness, seizures, dysarthria, cognitive changes, memory changes,  problems with balance, or unilateral weakness     Skin: No rash     Current Medications  Current medications include:   Current Outpatient Medications   Medication Sig    risperiDONE microspheres (RisperDAL consta) 25 mg/2 mL injection 2 mL by IntraMUSCular route Once every 2 weeks. Administer on March 9, 2022  Indications: bipolar disorder    busPIRone (BUSPAR) 30 mg tablet Take 1 Tablet by mouth two (2) times a day. Indications: repeated episodes of anxiety    cloNIDine HCL (CATAPRES) 0.1 mg tablet Take 1 Tablet by mouth three (3) times daily as needed for Restlessness. Indications: attention deficit disorder with hyperactivity     No current facility-administered medications for this visit.        Allergies  No Known Allergies    Past Medical History  Past Medical History:   Diagnosis Date    Anxiety and depression     Bipolar 1 disorder ([facility] [address]     Depression     Hypertension     Psychiatric disorder     bipolar       Past Surgical History   No past surgical history on file. Family History  Family History   Problem Relation Age of Onset    Colon Cancer Mother     No Known Problems Father        Social History  Social History     Socioeconomic History    Marital status: SINGLE     Spouse name: Not on file    Number of children: Not on file    Years of education: Not on file    Highest education level: Not on file   Occupational History    Not on file   Tobacco Use    Smoking status: Former    Smokeless tobacco: Never   Substance and Sexual Activity    Alcohol use: Not Currently     Alcohol/week: 7.0 standard drinks     Types: 7 Cans of beer per week     Comment: ; pt reports clean 1 year    Drug use: Not Currently     Types: Marijuana, Heroin, Cocaine     Comment: pt states last use 1-1.5 yrs    Sexual activity: Not Currently   Other Topics Concern    Not on file   Social History Narrative    ** Merged History Encounter **          Social Determinants of Health     Financial Resource Strain: Not on file   Food Insecurity: Not on file   Transportation Needs: Not on file   Physical Activity: Not on file   Stress: Not on file   Social Connections: Not on file   Intimate Partner Violence: Not on file   Housing Stability: Not on file       Immunizations  Immunization History   Administered Date(s) Administered    COVID-19, J&J, (age 18y+), IM, 0.5mL 02/21/2022       Objective   Vital Signs  Visit Vitals  BP 98/61   Pulse 77   Temp 97.1 °F (36.2 °C)   Wt 186 lb (84.4 kg)   SpO2 97%   BMI 23.88 kg/m²       Physical Examination  GEN: No apparent distress. Alert and oriented and responds to all questions appropriately.   EYES:  Conjunctiva clear  EAR: External ears are normal.    NECK:  Supple; no masses; thyroid normal           LUNGS: Respirations unlabored; clear to auscultation bilaterally  CARDIOVASCULAR: Regular, rate, and rhythm without murmurs, gallops or rubs   ABDOMEN: Soft; nontender; nondistended; normoactive bowel sounds; no masses or organomegaly  NEUROLOGIC:  No focal neurologic deficits. Strength and sensation grossly intact. Coordination and gait grossly intact. EXT: Well perfused. No edema. SKIN: No obvious rashes. Assessment:   [facility] is a 27 y.o. here to establish to care     Plan:   1. Bipolar 1 disorder (HCC) Stable on risperdal, buspar, clonidine. Last hospitalization in March 2022 for manic episode. - continue follow up with [facility]; Future  - CBC W/O DIFF; Future  - METABOLIC PANEL, COMPREHENSIVE; Future  - LIPID PANEL; Future  - TSH 3RD GENERATION; Future  - TSH 3RD GENERATION  - LIPID PANEL  - METABOLIC PANEL, COMPREHENSIVE  - CBC W/O DIFF  - HEMOGLOBIN A1C WITH EAG    2. Encounter for hepatitis C screening test for low risk patient  - HEPATITIS C AB; Future  - HEPATITIS C AB    3. Screening for HIV (human immunodeficiency virus)  - HIV 1/2 AG/AB, 4TH GENERATION,W RFLX CONFIRM; Future  - HIV 1/2 AG/AB, 4TH GENERATION,W RFLX CONFIRM    4. Encounter for drug screening Hx of heroin use. Most recent hx of THC, but patient reports not using any illicit drugs. - 11-DRUG SCREEN, URINE W RFLX CONFIRM; Future  - 11-DRUG SCREEN, URINE W RFLX CONFIRM    5. Prediabetes Last A1C in prediabetic range. Recheck today.  - HEMOGLOBIN A1C WITH EAG; Future  - HEMOGLOBIN A1C WITH EAG    6. Other specified abnormal findings of blood chemistry Screen lipid panel d/t risperdal use. - LIPID PANEL; Future  - LIPID PANEL    7. Encounter for medical examination to establish care Updated hx and HM. Campbell requesting Med2 form to get his license back. Need UDS, records from [facility], and discussion with psych - Dr. Abad Carey.   I attempted to call Dr. Abad Carey but they are on vacation for the next 3 weeks.  - patient will need to follow up for this form to be filled out once able to get a clean UDS, speak to Dr. Mary Delgado, and get the records from [facility]          I discussed the aforementioned diagnoses with the patient as well as the plan of care. All questions were answered and an AVS was provided.      I discussed this patient with Dr. Jennifer Zelaya (Attending Physician)      Signed By:  Jatin Delvalle DO

## 2022-07-29 NOTE — PROGRESS NOTES
Tawana Nicholson is a [address] [facility] y.o. 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 1. \"Have you been to the ER, urgent care clinic since your last visit? Hospitalized since your last visit? \"  This is his first visit    2. \"Have you seen or consulted any other health care providers outside of the [address] [place] [address] since your last visit? \"  [facility]      3. For patients aged 39-70: Has the patient had a colonoscopy / FIT/ Cologuard? NA - based on age      If the patient is female:    4. For patients aged 41-77: Has the patient had a mammogram within the past 2 years? NA - based on age or sex      11. For patients aged 21-65: Has the patient had a pap smear? NA - based on age or sex      Health Maintenance Due   Topic Date Due    Hepatitis C Screening  Never done    Depression Monitoring  Never done    DTaP/Tdap/Td series (1 - Tdap) Never done    Medicare Yearly Exam  Never done    COVID-19 Vaccine (2 - Booster for Kevin series) 04/18/2022         Medication Reconciliation completed, changes noted.   Please update medication list.

## 2022-07-30 LAB
AMPHETAMINES UR QL SCN: NEGATIVE NG/ML
BARBITURATES UR QL SCN: NEGATIVE NG/ML
BENZODIAZ UR QL: NEGATIVE NG/ML
BZE UR QL: NEGATIVE NG/ML
CANNABINOIDS UR QL SCN: NEGATIVE NG/ML
MDMA: NEGATIVE NG/ML
METHADONE UR QL SCN: NEGATIVE NG/ML
METHAQUALONE UR QL: NEGATIVE NG/ML
OPIATES UR QL: NEGATIVE NG/ML
PCP UR QL: NEGATIVE NG/ML
PROPOXYPH UR QL: NEGATIVE NG/ML

## 2022-08-16 ENCOUNTER — TELEPHONE (OUTPATIENT)
Dept: FAMILY MEDICINE CLINIC | Age: 32
End: 2022-08-16

## 2022-08-16 NOTE — TELEPHONE ENCOUNTER
----- Message from Brody Boyer sent at 8/15/2022  2:33 PM EDT -----  Subject: Message to Provider    QUESTIONS  Information for Provider? Patient wanted to confirm Medical 2 exam were   sent to [facility]. Please confirm with patient the current status.  ---------------------------------------------------------------------------  --------------  Agustin [facility]  [phone]; OK to leave message on voicemail  ---------------------------------------------------------------------------  --------------  SCRIPT ANSWERS  Relationship to Patient?  Self

## 2022-09-15 ENCOUNTER — OFFICE VISIT (OUTPATIENT)
Dept: FAMILY MEDICINE CLINIC | Age: 32
End: 2022-09-15
Payer: MEDICARE

## 2022-09-15 VITALS
BODY MASS INDEX: 23.82 KG/M2 | OXYGEN SATURATION: 98 % | DIASTOLIC BLOOD PRESSURE: 59 MMHG | HEIGHT: 74 IN | TEMPERATURE: 98 F | RESPIRATION RATE: 13 BRPM | SYSTOLIC BLOOD PRESSURE: 92 MMHG | HEART RATE: 72 BPM | WEIGHT: 185.6 LBS

## 2022-09-15 DIAGNOSIS — Z02.4 ENCOUNTER FOR EXAMINATION FOR DRIVING LICENSE: Primary | ICD-10-CM

## 2022-09-15 PROCEDURE — 99213 OFFICE O/P EST LOW 20 MIN: CPT

## 2022-09-15 PROCEDURE — G8427 DOCREV CUR MEDS BY ELIG CLIN: HCPCS

## 2022-09-15 PROCEDURE — G9717 DOC PT DX DEP/BP F/U NT REQ: HCPCS

## 2022-09-15 PROCEDURE — G8420 CALC BMI NORM PARAMETERS: HCPCS

## 2022-09-15 RX ORDER — BUPRENORPHINE HYDROCHLORIDE, NALOXONE HYDROCHLORIDE 8; 2 MG/1; MG/1
FILM, SOLUBLE BUCCAL; SUBLINGUAL
COMMUNITY
Start: 2022-09-09

## 2022-09-15 RX ORDER — SERTRALINE HYDROCHLORIDE 50 MG/1
50 TABLET, FILM COATED ORAL DAILY
COMMUNITY
Start: 2022-09-09

## 2022-09-15 NOTE — PROGRESS NOTES
[address] [facility] [place] [address]   Office [phone], Fax [phone]      Chief Complaint:   Diana Killian is a 27 y.o. male that presents for:     Chief Complaint   Patient presents with    Form Completion     Patient has DMV MED2 from that he is requesting to be filled out. Assessment/Plan:   I personally reviewed the following Pertinent Labs/Studies:   - Encounter Notes from 2022, Labs from 2022    Diagnoses and all orders for this visit:    1. Encounter for examination for driving license: filled out paperwork required to get License back. Overall deemed appropriate for him to take neck steps to secure drivers license. Recommend letter from Psychiatrist to clear for conditions.  -     DRUG SCREEN, URINE; Future       Follow up: Follow-up and Dispositions    Return if symptoms worsen or fail to improve. Subjective:   HPI:  Diana Killian is a 27 y.o. male who presents to clinic for evaluation of DMV medical form completion for getting back 's license. Patient has past medical history significant for bipolar 1 disorder, anxiety and depression, adjustment disorder with disturbance of conduct, and heroin use. Patient reports compliance with Suboxone treatment for roughly 3 weeks. Form was completed to the best my ability, it is scanned into the chart. Requested letter from psychiatrist to sign off regarding other psychiatric conditions. Patient otherwise without complaints and is asymptomatic today. Patient otherwise denies fevers, chills, headaches, changes in vision, changes in hearing, chest pain, palpitations, shortness of breath, abdominal pain, nausea, vomiting, diarrhea, constipation, lower extremity edema, and numbness and tingling in his extremities.       Health Maintenance:  Health Maintenance Due   Topic Date Due    Depression Monitoring  Never done    DTaP/Tdap/Td series (1 - Tdap) Never done    Medicare Yearly Exam  Never done    COVID-19 Vaccine (2 - Booster for [employer] series) 04/18/2022    Flu Vaccine (1) Never done      ROS:   Past medical history, social history, and medications personally reviewed. Past Medical History:   Diagnosis Date    Anxiety and depression     Bipolar 1 disorder ([address]     Depression     Hypertension     Psychiatric disorder     bipolar      Allergies personally reviewed. No Known Allergies   Objective:   Vitals reviewed. Visit Vitals  BP (!) 92/59 (BP 1 Location: Right arm, BP Patient Position: Sitting, BP Cuff Size: Large adult)   Pulse 72   Temp 98 °F (36.7 °C)   Resp 13   Ht 6' 2\" (1.88 m)   Wt 185 lb 9.6 oz (84.2 kg)   SpO2 98%   BMI 23.83 kg/m²      Physical Exam     Vitals Reviewed. General AO x 3. No distress. Not diaphoretic. No jaundice. No cyanosis. No pallor. Neck No thyromegaly present. No JVD. No cervical adenopathy. Cardio Normal rate, regular rhythm. No murmur, rubs, or gallop. Pulmonary Effort normal. No accessory muscle use. No wheezes, rales, or rhonchi. Abdominal Soft. Bowel sounds normal. No tenderness. No distension. Extremities No edema of lower extremities. Pulses 2+. Neurological CN II-XII grossly intact. No focal deficits. Skin No rash. Pt was discussed with Dr. Janina Cummings (attending physician). I have reviewed pertinent labs results and other data. I have discussed the diagnosis with the patient and the intended plan as seen in the above orders. The patient has received an after-visit summary and questions were answered concerning future plans. I have discussed medication side effects and warnings with the patient as well. Note is dictated utilizing voice recognition software. Unfortunately this leads to occasional typographical errors. I apologize in advance if the situation occurs. If questions occur please do not hesitate to call our office.     Monse Maciel MD  Resident [address] [facility]  09/15/22

## 2022-09-15 NOTE — PROGRESS NOTES
Augusto Barrera is a 27 y.o. male    Chief Complaint   Patient presents with    Form Completion     Patient has DMV MED2 from that he is requesting to be filled out. 1. Have you been to the ER, urgent care clinic since your last visit? Hospitalized since your last visit? No  2. Have you seen or consulted any other health care providers outside of the [address] [place] [address] since your last visit? Include any pap smears or colon screening.  No    Visit Vitals  BP (!) 92/59 (BP 1 Location: Right arm, BP Patient Position: Sitting, BP Cuff Size: Large adult)   Pulse 72   Temp 98 °F (36.7 °C)   Resp 13   Ht 6' 2\" (1.88 m)   Wt 185 lb 9.6 oz (84.2 kg)   SpO2 98%   BMI 23.83 kg/m²     3 most recent PHQ Screens 9/15/2022   Little interest or pleasure in doing things Not at all   Feeling down, depressed, irritable, or hopeless Not at all   Total Score PHQ 2 0     Health Maintenance Due   Topic Date Due    Depression Monitoring  Never done    DTaP/Tdap/Td series (1 - Tdap) Never done    Medicare Yearly Exam  Never done    COVID-19 Vaccine (2 - Booster for [employer] series) 04/18/2022    Flu Vaccine (1) Never done

## 2022-09-16 LAB
AMPHET UR QL SCN: NEGATIVE
BARBITURATES UR QL SCN: NEGATIVE
BENZODIAZ UR QL: NEGATIVE
CANNABINOIDS UR QL SCN: NEGATIVE
COCAINE UR QL SCN: NEGATIVE
DRUG SCRN COMMENT,DRGCM: NORMAL
METHADONE UR QL: NEGATIVE
OPIATES UR QL: NEGATIVE
PCP UR QL: NEGATIVE

## 2022-09-16 NOTE — PROGRESS NOTES
UDS neg. Suboxone will not test positive on office drug screen as buprenorphine is not specifically tested for.

## 2022-11-15 ENCOUNTER — HOSPITAL ENCOUNTER (EMERGENCY)
Age: 32
Discharge: HOME | End: 2022-11-15
Attending: STUDENT IN AN ORGANIZED HEALTH CARE EDUCATION/TRAINING PROGRAM
Payer: MEDICAID

## 2022-11-15 VITALS
DIASTOLIC BLOOD PRESSURE: 77 MMHG | BODY MASS INDEX: 24.52 KG/M2 | TEMPERATURE: 98 F | WEIGHT: 185 LBS | SYSTOLIC BLOOD PRESSURE: 120 MMHG | OXYGEN SATURATION: 96 % | HEART RATE: 60 BPM | HEIGHT: 73 IN | RESPIRATION RATE: 16 BRPM

## 2022-11-15 DIAGNOSIS — R11.10 VOMITING AND DIARRHEA: Primary | ICD-10-CM

## 2022-11-15 DIAGNOSIS — R19.7 VOMITING AND DIARRHEA: Primary | ICD-10-CM

## 2022-11-15 LAB
ALBUMIN SERPL-MCNC: 4.3 G/DL (ref 3.5–5)
ALBUMIN/GLOB SERPL: 1.4 {RATIO} (ref 1.1–2.2)
ALP SERPL-CCNC: 108 U/L (ref 45–117)
ALT SERPL-CCNC: 42 U/L (ref 12–78)
ANION GAP SERPL CALC-SCNC: 4 MMOL/L (ref 5–15)
APPEARANCE UR: CLEAR
AST SERPL-CCNC: 34 U/L (ref 15–37)
BACTERIA URNS QL MICRO: NEGATIVE /HPF
BASOPHILS # BLD: 0 K/UL (ref 0–0.1)
BASOPHILS NFR BLD: 1 % (ref 0–1)
BILIRUB SERPL-MCNC: 0.2 MG/DL (ref 0.2–1)
BILIRUB UR QL: NEGATIVE
BUN SERPL-MCNC: 15 MG/DL (ref 6–20)
BUN/CREAT SERPL: 18 (ref 12–20)
CALCIUM SERPL-MCNC: 9.2 MG/DL (ref 8.5–10.1)
CHLORIDE SERPL-SCNC: 109 MMOL/L (ref 97–108)
CO2 SERPL-SCNC: 27 MMOL/L (ref 21–32)
COLOR UR: NORMAL
COMMENT, HOLDF: NORMAL
COMMENT, HOLDF: NORMAL
COVID-19 RAPID TEST, COVR: NOT DETECTED
CREAT SERPL-MCNC: 0.84 MG/DL (ref 0.7–1.3)
DIFFERENTIAL METHOD BLD: NORMAL
EOSINOPHIL # BLD: 0.1 K/UL (ref 0–0.4)
EOSINOPHIL NFR BLD: 2 % (ref 0–7)
EPITH CASTS URNS QL MICRO: NORMAL /LPF
ERYTHROCYTE [DISTWIDTH] IN BLOOD BY AUTOMATED COUNT: 12.7 % (ref 11.5–14.5)
FLUAV AG NPH QL IA: NEGATIVE
FLUBV AG NOSE QL IA: NEGATIVE
GLOBULIN SER CALC-MCNC: 3 G/DL (ref 2–4)
GLUCOSE SERPL-MCNC: 75 MG/DL (ref 65–100)
GLUCOSE UR STRIP.AUTO-MCNC: NEGATIVE MG/DL
HCT VFR BLD AUTO: 41.3 % (ref 36.6–50.3)
HGB BLD-MCNC: 13.6 G/DL (ref 12.1–17)
HGB UR QL STRIP: NEGATIVE
HYALINE CASTS URNS QL MICRO: NORMAL /LPF (ref 0–2)
IMM GRANULOCYTES # BLD AUTO: 0 K/UL (ref 0–0.04)
IMM GRANULOCYTES NFR BLD AUTO: 0 % (ref 0–0.5)
KETONES UR QL STRIP.AUTO: NEGATIVE MG/DL
LEUKOCYTE ESTERASE UR QL STRIP.AUTO: NEGATIVE
LIPASE SERPL-CCNC: 83 U/L (ref 73–393)
LYMPHOCYTES # BLD: 2.6 K/UL (ref 0.8–3.5)
LYMPHOCYTES NFR BLD: 43 % (ref 12–49)
MAGNESIUM SERPL-MCNC: 2 MG/DL (ref 1.6–2.4)
MCH RBC QN AUTO: 27.9 PG (ref 26–34)
MCHC RBC AUTO-ENTMCNC: 32.9 G/DL (ref 30–36.5)
MCV RBC AUTO: 84.8 FL (ref 80–99)
MONOCYTES # BLD: 0.7 K/UL (ref 0–1)
MONOCYTES NFR BLD: 12 % (ref 5–13)
NEUTS SEG # BLD: 2.4 K/UL (ref 1.8–8)
NEUTS SEG NFR BLD: 42 % (ref 32–75)
NITRITE UR QL STRIP.AUTO: NEGATIVE
NRBC # BLD: 0 K/UL (ref 0–0.01)
NRBC BLD-RTO: 0 PER 100 WBC
PH UR STRIP: 5.5 [PH] (ref 5–8)
PLATELET # BLD AUTO: 242 K/UL (ref 150–400)
PMV BLD AUTO: 9.2 FL (ref 8.9–12.9)
POTASSIUM SERPL-SCNC: 5 MMOL/L (ref 3.5–5.1)
PROT SERPL-MCNC: 7.3 G/DL (ref 6.4–8.2)
PROT UR STRIP-MCNC: NEGATIVE MG/DL
RBC # BLD AUTO: 4.87 M/UL (ref 4.1–5.7)
RBC #/AREA URNS HPF: NORMAL /HPF (ref 0–5)
SAMPLES BEING HELD,HOLD: NORMAL
SAMPLES BEING HELD,HOLD: NORMAL
SODIUM SERPL-SCNC: 140 MMOL/L (ref 136–145)
SOURCE, COVRS: NORMAL
SP GR UR REFRACTOMETRY: 1.01 (ref 1–1.03)
UROBILINOGEN UR QL STRIP.AUTO: 0.2 EU/DL (ref 0.2–1)
WBC # BLD AUTO: 5.9 K/UL (ref 4.1–11.1)
WBC URNS QL MICRO: NORMAL /HPF (ref 0–4)

## 2022-11-15 PROCEDURE — 83735 ASSAY OF MAGNESIUM: CPT

## 2022-11-15 PROCEDURE — 96374 THER/PROPH/DIAG INJ IV PUSH: CPT

## 2022-11-15 PROCEDURE — 80053 COMPREHEN METABOLIC PANEL: CPT

## 2022-11-15 PROCEDURE — 99284 EMERGENCY DEPT VISIT MOD MDM: CPT

## 2022-11-15 PROCEDURE — 83690 ASSAY OF LIPASE: CPT

## 2022-11-15 PROCEDURE — 85025 COMPLETE CBC W/AUTO DIFF WBC: CPT

## 2022-11-15 PROCEDURE — 74011250636 HC RX REV CODE- 250/636: Performed by: STUDENT IN AN ORGANIZED HEALTH CARE EDUCATION/TRAINING PROGRAM

## 2022-11-15 PROCEDURE — 81001 URINALYSIS AUTO W/SCOPE: CPT

## 2022-11-15 PROCEDURE — 87804 INFLUENZA ASSAY W/OPTIC: CPT

## 2022-11-15 PROCEDURE — 87635 SARS-COV-2 COVID-19 AMP PRB: CPT

## 2022-11-15 PROCEDURE — 96361 HYDRATE IV INFUSION ADD-ON: CPT

## 2022-11-15 PROCEDURE — 36415 COLL VENOUS BLD VENIPUNCTURE: CPT

## 2022-11-15 RX ORDER — ONDANSETRON 4 MG/1
4 TABLET, ORALLY DISINTEGRATING ORAL
Qty: 12 TABLET | Refills: 0 | Status: SHIPPED | OUTPATIENT
Start: 2022-11-15

## 2022-11-15 RX ORDER — ONDANSETRON 2 MG/ML
4 INJECTION INTRAMUSCULAR; INTRAVENOUS
Status: COMPLETED | OUTPATIENT
Start: 2022-11-15 | End: 2022-11-15

## 2022-11-15 RX ADMIN — ONDANSETRON 4 MG: 2 INJECTION INTRAMUSCULAR; INTRAVENOUS at 14:01

## 2022-11-15 RX ADMIN — SODIUM CHLORIDE 1000 ML: 9 INJECTION, SOLUTION INTRAVENOUS at 14:01

## 2022-11-15 NOTE — DISCHARGE INSTRUCTIONS
- Continue Zofran as needed for nausea and vomiting, drink lots of fluids throughout the day  - Return for abdominal pain, rectal bleeding, difficulty breathing, chest pain, new or worsening symptoms

## 2022-11-15 NOTE — Clinical Note
Meghan Matos was seen and treated in our emergency department on 11/15/2022. Please excuse Jerrodmontse Gene from work today 11/15/22 for medical reasons.     Shadi Gardner MD

## 2022-11-15 NOTE — Clinical Note
Neel Carmela was seen and treated in our emergency department on 11/15/2022. Please excuse Theodor Done from work today 11/15/22 for medical reasons.     Amanda Lal MD

## 2022-11-15 NOTE — ED PROVIDER NOTES
Chief Complaint   Patient presents with    Nausea     This is a 30-year-old male on methadone presenting with several days of nausea and vomiting, as well as diarrhea and sore throat. Denies associated abdominal pain or rectal bleeding. No fevers, cough, chest pain or shortness of breath. His partner came down with a similar illness recently. No urinary symptoms. No myalgias. Symptoms are moderate in nature without alleviating factors. Review of Systems   Constitutional:  Negative for fever. HENT:  Negative for facial swelling. Eyes:  Negative for redness. Respiratory:  Negative for shortness of breath. Cardiovascular:  Negative for chest pain. Gastrointestinal:  Positive for diarrhea and vomiting. Negative for abdominal pain and blood in stool. Genitourinary:  Negative for difficulty urinating. Musculoskeletal:  Negative for myalgias. Neurological:  Negative for speech difficulty. Psychiatric/Behavioral:  Negative for confusion. Past Medical History:   Diagnosis Date    Anxiety and depression     Bipolar 1 disorder ([facility] [address]     Depression     Hypertension     Psychiatric disorder     bipolar       No past surgical history on file.       Family History:   Problem Relation Age of Onset    Colon Cancer Mother     No Known Problems Father        Social History     Socioeconomic History    Marital status: SINGLE     Spouse name: Not on file    Number of children: Not on file    Years of education: Not on file    Highest education level: Not on file   Occupational History    Not on file   Tobacco Use    Smoking status: Former    Smokeless tobacco: Never   Substance and Sexual Activity    Alcohol use: Not Currently     Alcohol/week: 7.0 standard drinks     Types: 7 Cans of beer per week     Comment: ; pt reports clean 1 year    Drug use: Not Currently     Types: Marijuana, Heroin, Cocaine     Comment: pt states last use 1-1.5 yrs    Sexual activity: Not Currently   Other Topics Concern Not on file   Social History Narrative    ** Merged History Encounter **          Social Determinants of Health     Financial Resource Strain: Not on file   Food Insecurity: Not on file   Transportation Needs: Not on file   Physical Activity: Not on file   Stress: Not on file   Social Connections: Not on file   Intimate Partner Violence: Not on file   Housing Stability: Not on file         ALLERGIES: Patient has no known allergies. Vitals:    11/15/22 1335   BP: 120/77   Pulse: 60   Resp: 16   Temp: 98 °F (36.7 °C)   SpO2: 96%   Weight: 83.9 kg (185 lb)   Height: 6' 1\" (1.854 m)       Physical exam  General:  Awake and alert, NAD  HEENT:  NC/AT, equal pupils, moist mucous membranes  Neck:   Normal inspection, full range of motion  Cardiac:  RRR, no murmurs  Respiratory:  Clear bilaterally, no wheezes, rales, rhonchi  Abdomen:  Soft and nontender, nondistended  Extremities: Warm and well perfused, no peripheral edema  Neuro:  Moving all extremities symmetrically without gross motor deficit  Skin:   No rashes, ecchymoses, or pallor      Recent Results (from the past 12 hour(s))   SAMPLES BEING HELD    Collection Time: 11/15/22  1:37 PM   Result Value Ref Range    SAMPLES BEING HELD GRN. RED     COMMENT        Add-on orders for these samples will be processed based on acceptable specimen integrity and analyte stability, which may vary by analyte.    CBC WITH AUTOMATED DIFF    Collection Time: 11/15/22  1:37 PM   Result Value Ref Range    WBC 5.9 4.1 - 11.1 K/uL    RBC 4.87 4.10 - 5.70 M/uL    HGB 13.6 12.1 - 17.0 g/dL    HCT 41.3 36.6 - 50.3 %    MCV 84.8 80.0 - 99.0 FL    MCH 27.9 26.0 - 34.0 PG    MCHC 32.9 30.0 - 36.5 g/dL    RDW 12.7 11.5 - 14.5 %    PLATELET 378 554 - 562 K/uL    MPV 9.2 8.9 - 12.9 FL    NRBC 0.0 0  WBC    ABSOLUTE NRBC 0.00 0.00 - 0.01 K/uL    NEUTROPHILS 42 32 - 75 %    LYMPHOCYTES 43 12 - 49 %    MONOCYTES 12 5 - 13 %    EOSINOPHILS 2 0 - 7 %    BASOPHILS 1 0 - 1 %    IMMATURE GRANULOCYTES 0 0.0 - 0.5 %    ABS. NEUTROPHILS 2.4 1.8 - 8.0 K/UL    ABS. LYMPHOCYTES 2.6 0.8 - 3.5 K/UL    ABS. MONOCYTES 0.7 0.0 - 1.0 K/UL    ABS. EOSINOPHILS 0.1 0.0 - 0.4 K/UL    ABS. BASOPHILS 0.0 0.0 - 0.1 K/UL    ABS. IMM. GRANS. 0.0 0.00 - 0.04 K/UL    DF AUTOMATED     METABOLIC PANEL, COMPREHENSIVE    Collection Time: 11/15/22  1:37 PM   Result Value Ref Range    Sodium 140 136 - 145 mmol/L    Potassium 5.0 3.5 - 5.1 mmol/L    Chloride 109 (H) 97 - 108 mmol/L    CO2 27 21 - 32 mmol/L    Anion gap 4 (L) 5 - 15 mmol/L    Glucose 75 65 - 100 mg/dL    BUN 15 6 - 20 MG/DL    Creatinine 0.84 0.70 - 1.30 MG/DL    BUN/Creatinine ratio 18 12 - 20      eGFR >60 >60 ml/min/1.73m2    Calcium 9.2 8.5 - 10.1 MG/DL    Bilirubin, total 0.2 0.2 - 1.0 MG/DL    ALT (SGPT) 42 12 - 78 U/L    AST (SGOT) 34 15 - 37 U/L    Alk.  phosphatase 108 45 - 117 U/L    Protein, total 7.3 6.4 - 8.2 g/dL    Albumin 4.3 3.5 - 5.0 g/dL    Globulin 3.0 2.0 - 4.0 g/dL    A-G Ratio 1.4 1.1 - 2.2     MAGNESIUM    Collection Time: 11/15/22  1:37 PM   Result Value Ref Range    Magnesium 2.0 1.6 - 2.4 mg/dL   LIPASE    Collection Time: 11/15/22  1:37 PM   Result Value Ref Range    Lipase 83 73 - 393 U/L   INFLUENZA A+B VIRAL AGS    Collection Time: 11/15/22  2:05 PM   Result Value Ref Range    Influenza A Antigen Negative NEG      Influenza B Antigen Negative NEG     COVID-19 RAPID TEST    Collection Time: 11/15/22  2:05 PM   Result Value Ref Range    Specimen source Nasopharyngeal      COVID-19 rapid test Not detected NOTD     URINALYSIS W/MICROSCOPIC    Collection Time: 11/15/22  2:31 PM   Result Value Ref Range    Color YELLOW/STRAW      Appearance CLEAR CLEAR      Specific gravity 1.015 1.003 - 1.030      pH (UA) 5.5 5.0 - 8.0      Protein Negative NEG mg/dL    Glucose Negative NEG mg/dL    Ketone Negative NEG mg/dL    Bilirubin Negative NEG      Blood Negative NEG      Urobilinogen 0.2 0.2 - 1.0 EU/dL    Nitrites Negative NEG      Leukocyte Esterase Negative NEG      WBC 0-4 0 - 4 /hpf    RBC 0-5 0 - 5 /hpf    Epithelial cells FEW FEW /lpf    Bacteria Negative NEG /hpf    Hyaline cast 0-2 0 - 2 /lpf   SAMPLES BEING HELD    Collection Time: 11/15/22  2:31 PM   Result Value Ref Range    SAMPLES BEING HELD 1UC     COMMENT        Add-on orders for these samples will be processed based on acceptable specimen integrity and analyte stability, which may vary by analyte. No results found. Procedures - none unless documented below    ED course: Labs reviewed. Abdomen nontender on exam, VS stable. After receiving IV fluids and Zofran he tolerated an oral fluid challenge without additional emesis. Probable viral etiology. Recommend supportive therapy at home and follow up with his primary physician for reevaluation.     Impression: Vomiting and diarrhea  Disposition: Discharge home

## 2023-11-30 ENCOUNTER — TELEPHONE (OUTPATIENT)
Age: 33
End: 2023-11-30

## 2023-11-30 NOTE — TELEPHONE ENCOUNTER
----- Message from Kalin Joyce sent at 11/30/2023  1:14 PM EST -----  Subject: Appointment Request    Reason for Call: Established Patient Appointment needed: New Patient   Request Appointment    QUESTIONS    Reason for appointment request? Other - PCP not listed on pt file     Additional Information for Provider? Pt was hoping to see Dr. Jennifer Gibson   for a Med II form for his drivers license. He states that he already   established with this provider.  Please note that pt needs this form   completed by 12/31.   ---------------------------------------------------------------------------  --------------  Shefali VIEYRA  [phone]; OK to leave message on voicemail  ---------------------------------------------------------------------------  --------------  SCRIPT ANSWERS

## 2023-12-06 ENCOUNTER — OFFICE VISIT (OUTPATIENT)
Age: 33
End: 2023-12-06
Payer: MEDICAID

## 2023-12-06 VITALS
BODY MASS INDEX: 31.57 KG/M2 | WEIGHT: 239.25 LBS | SYSTOLIC BLOOD PRESSURE: 108 MMHG | RESPIRATION RATE: 18 BRPM | DIASTOLIC BLOOD PRESSURE: 59 MMHG | HEART RATE: 72 BPM | OXYGEN SATURATION: 93 %

## 2023-12-06 DIAGNOSIS — Z23 ENCOUNTER FOR IMMUNIZATION: ICD-10-CM

## 2023-12-06 DIAGNOSIS — R73.03 PREDIABETES: ICD-10-CM

## 2023-12-06 DIAGNOSIS — Z00.00 ENCOUNTER FOR GENERAL ADULT MEDICAL EXAMINATION WITHOUT ABNORMAL FINDINGS: Primary | ICD-10-CM

## 2023-12-06 PROCEDURE — G0008 ADMIN INFLUENZA VIRUS VAC: HCPCS

## 2023-12-06 PROCEDURE — 99395 PREV VISIT EST AGE 18-39: CPT

## 2023-12-06 PROCEDURE — 90715 TDAP VACCINE 7 YRS/> IM: CPT

## 2023-12-06 SDOH — ECONOMIC STABILITY: FOOD INSECURITY: WITHIN THE PAST 12 MONTHS, THE FOOD YOU BOUGHT JUST DIDN'T LAST AND YOU DIDN'T HAVE MONEY TO GET MORE.: PATIENT DECLINED

## 2023-12-06 SDOH — ECONOMIC STABILITY: FOOD INSECURITY: WITHIN THE PAST 12 MONTHS, YOU WORRIED THAT YOUR FOOD WOULD RUN OUT BEFORE YOU GOT MONEY TO BUY MORE.: PATIENT DECLINED

## 2023-12-06 SDOH — ECONOMIC STABILITY: HOUSING INSECURITY
IN THE LAST 12 MONTHS, WAS THERE A TIME WHEN YOU DID NOT HAVE A STEADY PLACE TO SLEEP OR SLEPT IN A SHELTER (INCLUDING NOW)?: PATIENT REFUSED

## 2023-12-06 SDOH — ECONOMIC STABILITY: INCOME INSECURITY: HOW HARD IS IT FOR YOU TO PAY FOR THE VERY BASICS LIKE FOOD, HOUSING, MEDICAL CARE, AND HEATING?: PATIENT DECLINED

## 2023-12-06 ASSESSMENT — PATIENT HEALTH QUESTIONNAIRE - PHQ9
SUM OF ALL RESPONSES TO PHQ QUESTIONS 1-9: 1
1. LITTLE INTEREST OR PLEASURE IN DOING THINGS: 0
SUM OF ALL RESPONSES TO PHQ QUESTIONS 1-9: 1
SUM OF ALL RESPONSES TO PHQ QUESTIONS 1-9: 1
2. FEELING DOWN, DEPRESSED OR HOPELESS: 1
SUM OF ALL RESPONSES TO PHQ QUESTIONS 1-9: 1
SUM OF ALL RESPONSES TO PHQ9 QUESTIONS 1 & 2: 1

## 2023-12-06 ASSESSMENT — ENCOUNTER SYMPTOMS
NAUSEA: 0
WHEEZING: 0
ABDOMINAL PAIN: 0
VOMITING: 0
RHINORRHEA: 0
SHORTNESS OF BREATH: 0
TROUBLE SWALLOWING: 0
COUGH: 0

## 2023-12-07 ENCOUNTER — OFFICE VISIT (OUTPATIENT)
Age: 33
End: 2023-12-07
Payer: MEDICAID

## 2023-12-07 VITALS
SYSTOLIC BLOOD PRESSURE: 122 MMHG | BODY MASS INDEX: 31.94 KG/M2 | DIASTOLIC BLOOD PRESSURE: 78 MMHG | HEART RATE: 74 BPM | WEIGHT: 241 LBS | OXYGEN SATURATION: 97 % | TEMPERATURE: 98.2 F | HEIGHT: 73 IN | RESPIRATION RATE: 22 BRPM

## 2023-12-07 DIAGNOSIS — F31.2 BIPOLAR DISORDER, CURRENT EPISODE MANIC SEVERE WITH PSYCHOTIC FEATURES (HCC): ICD-10-CM

## 2023-12-07 DIAGNOSIS — F11.10: ICD-10-CM

## 2023-12-07 DIAGNOSIS — Z02.4 ENCOUNTER FOR EXAMINATION FOR DRIVING LICENSE: Primary | ICD-10-CM

## 2023-12-07 LAB
ALBUMIN SERPL-MCNC: 4.3 G/DL (ref 3.5–5)
ALBUMIN/GLOB SERPL: 1.3 (ref 1.1–2.2)
ALP SERPL-CCNC: 124 U/L (ref 45–117)
ALT SERPL-CCNC: 32 U/L (ref 12–78)
ANION GAP SERPL CALC-SCNC: 3 MMOL/L (ref 5–15)
AST SERPL-CCNC: 18 U/L (ref 15–37)
BILIRUB SERPL-MCNC: 0.2 MG/DL (ref 0.2–1)
BUN SERPL-MCNC: 10 MG/DL (ref 6–20)
BUN/CREAT SERPL: 13 (ref 12–20)
CALCIUM SERPL-MCNC: 8.9 MG/DL (ref 8.5–10.1)
CHLORIDE SERPL-SCNC: 108 MMOL/L (ref 97–108)
CO2 SERPL-SCNC: 27 MMOL/L (ref 21–32)
CREAT SERPL-MCNC: 0.8 MG/DL (ref 0.7–1.3)
ERYTHROCYTE [DISTWIDTH] IN BLOOD BY AUTOMATED COUNT: 13.8 % (ref 11.5–14.5)
EST. AVERAGE GLUCOSE BLD GHB EST-MCNC: 114 MG/DL
GLOBULIN SER CALC-MCNC: 3.2 G/DL (ref 2–4)
GLUCOSE SERPL-MCNC: 98 MG/DL (ref 65–100)
HBA1C MFR BLD: 5.6 % (ref 4–5.6)
HCT VFR BLD AUTO: 43.9 % (ref 36.6–50.3)
HGB BLD-MCNC: 13.7 G/DL (ref 12.1–17)
MCH RBC QN AUTO: 26.7 PG (ref 26–34)
MCHC RBC AUTO-ENTMCNC: 31.2 G/DL (ref 30–36.5)
MCV RBC AUTO: 85.4 FL (ref 80–99)
NRBC # BLD: 0 K/UL (ref 0–0.01)
NRBC BLD-RTO: 0 PER 100 WBC
PLATELET # BLD AUTO: 271 K/UL (ref 150–400)
PMV BLD AUTO: 10 FL (ref 8.9–12.9)
POTASSIUM SERPL-SCNC: 4.6 MMOL/L (ref 3.5–5.1)
PROT SERPL-MCNC: 7.5 G/DL (ref 6.4–8.2)
RBC # BLD AUTO: 5.14 M/UL (ref 4.1–5.7)
SODIUM SERPL-SCNC: 138 MMOL/L (ref 136–145)
WBC # BLD AUTO: 7.6 K/UL (ref 4.1–11.1)

## 2023-12-07 PROCEDURE — 99213 OFFICE O/P EST LOW 20 MIN: CPT

## 2023-12-07 ASSESSMENT — COLUMBIA-SUICIDE SEVERITY RATING SCALE - C-SSRS
5. HAVE YOU STARTED TO WORK OUT OR WORKED OUT THE DETAILS OF HOW TO KILL YOURSELF? DO YOU INTEND TO CARRY OUT THIS PLAN?: NO
4. HAVE YOU HAD THESE THOUGHTS AND HAD SOME INTENTION OF ACTING ON THEM?: NO
7. DID THIS OCCUR IN THE LAST THREE MONTHS: NO
3. HAVE YOU BEEN THINKING ABOUT HOW YOU MIGHT KILL YOURSELF?: NO

## 2023-12-07 ASSESSMENT — PATIENT HEALTH QUESTIONNAIRE - PHQ9
SUM OF ALL RESPONSES TO PHQ QUESTIONS 1-9: 0
2. FEELING DOWN, DEPRESSED OR HOPELESS: 0
9. THOUGHTS THAT YOU WOULD BE BETTER OFF DEAD, OR OF HURTING YOURSELF: 0
10. IF YOU CHECKED OFF ANY PROBLEMS, HOW DIFFICULT HAVE THESE PROBLEMS MADE IT FOR YOU TO DO YOUR WORK, TAKE CARE OF THINGS AT HOME, OR GET ALONG WITH OTHER PEOPLE: 0
4. FEELING TIRED OR HAVING LITTLE ENERGY: 0
7. TROUBLE CONCENTRATING ON THINGS, SUCH AS READING THE NEWSPAPER OR WATCHING TELEVISION: 0
8. MOVING OR SPEAKING SO SLOWLY THAT OTHER PEOPLE COULD HAVE NOTICED. OR THE OPPOSITE, BEING SO FIGETY OR RESTLESS THAT YOU HAVE BEEN MOVING AROUND A LOT MORE THAN USUAL: 0
SUM OF ALL RESPONSES TO PHQ QUESTIONS 1-9: 0
1. LITTLE INTEREST OR PLEASURE IN DOING THINGS: 0
SUM OF ALL RESPONSES TO PHQ QUESTIONS 1-9: 0
SUM OF ALL RESPONSES TO PHQ QUESTIONS 1-9: 0
1. LITTLE INTEREST OR PLEASURE IN DOING THINGS: 0
SUM OF ALL RESPONSES TO PHQ9 QUESTIONS 1 & 2: 0
SUM OF ALL RESPONSES TO PHQ QUESTIONS 1-9: 0
SUM OF ALL RESPONSES TO PHQ9 QUESTIONS 1 & 2: 0
3. TROUBLE FALLING OR STAYING ASLEEP: 0
SUM OF ALL RESPONSES TO PHQ QUESTIONS 1-9: 0
2. FEELING DOWN, DEPRESSED OR HOPELESS: 0
SUM OF ALL RESPONSES TO PHQ QUESTIONS 1-9: 0
SUM OF ALL RESPONSES TO PHQ QUESTIONS 1-9: 0
5. POOR APPETITE OR OVEREATING: 0
6. FEELING BAD ABOUT YOURSELF - OR THAT YOU ARE A FAILURE OR HAVE LET YOURSELF OR YOUR FAMILY DOWN: 0

## 2023-12-07 NOTE — PROGRESS NOTES
[facility], [address] [place] [address]   Office [phone], Fax [phone]      Chief Complaint:   Alfredo Vivar is a 28 y.o. male that presents for:     Chief Complaint   Patient presents with    H&P     Pt here today to have [address] Report completed to maintain his Drivers License. Needs completed 12/31/2023     Assessment/Plan:   I personally reviewed the following Pertinent Labs/Studies:   - Encounter Notes from 9/15/22    Francisco Quiroz was seen today for h&p. Diagnoses and all orders for this visit:    Encounter for examination for driving license    Bipolar disorder, current episode manic severe with psychotic features ([address])    Heroin use disorder ([address])    Part 1 for general medical provider part of form completed. Bipolar and other mental health conditions as above are well-controlled on Risperdal and Zoloft. Patient to see psychiatrist, Dr. Theresa Patel, for second medical provider form completion and psychiatry/substance abuse section of the form completion. Follow up:   Return for annual well visit. Subjective:   HPI:  Alfredo Vivar is a 28 y.o. male who presents to clinic for evaluation of:    Need for DMV paperwork to be completed. Received license back about 3 mo ago. Has been sober for 2 years from heroin  Still taking Suboxone daily. Follows with MAT. F/w Dr. Theresa Patel, Psych  Compliant with Risperdal 5 mg injection monthly, sertraline 50 daily. No side effects to medications. Health Maintenance:  Health Maintenance Due   Topic Date Due    Hepatitis B vaccine (1 of 3 - 3-dose series) 1989    Varicella vaccine (1 of 2 - 2-dose childhood series) Never done    COVID-19 Vaccine (2 - 2023-24 season) 09/01/2023      ROS:   Patient otherwise denies fevers, chills, headaches, SI/HI. Past medical history, social history, and medications personally reviewed.   Past Medical History:   Diagnosis Date    Anxiety and depression     Bipolar 1 disorder ([address])     Depression

## 2023-12-08 NOTE — PROGRESS NOTES
[address] [address] [facility] Residency Attending Attestation: While the patient was in clinic or immediately following the patient leaving the clinic, I reviewed the patient's medical history, the resident's findings on physical examination, and the patient's diagnosis and treatment plan with the resident and agree with the documentation in the note.      Darien Shaikh MD

## 2024-05-29 ENCOUNTER — OFFICE VISIT (OUTPATIENT)
Age: 34
End: 2024-05-29

## 2024-05-29 VITALS
OXYGEN SATURATION: 96 % | RESPIRATION RATE: 18 BRPM | TEMPERATURE: 97.8 F | BODY MASS INDEX: 32.6 KG/M2 | DIASTOLIC BLOOD PRESSURE: 52 MMHG | SYSTOLIC BLOOD PRESSURE: 114 MMHG | HEIGHT: 73 IN | WEIGHT: 246 LBS | HEART RATE: 61 BPM

## 2024-05-29 DIAGNOSIS — Z91.89 DRIVING SAFETY ISSUE: Primary | ICD-10-CM

## 2024-05-29 ASSESSMENT — PATIENT HEALTH QUESTIONNAIRE - PHQ9
1. LITTLE INTEREST OR PLEASURE IN DOING THINGS: NOT AT ALL
SUM OF ALL RESPONSES TO PHQ9 QUESTIONS 1 & 2: 0
SUM OF ALL RESPONSES TO PHQ QUESTIONS 1-9: 0
2. FEELING DOWN, DEPRESSED OR HOPELESS: NOT AT ALL

## 2024-05-29 NOTE — PROGRESS NOTES
[address] [place] [address]   Office [phone], Fax [phone]      Chief Complaint:   Harley Sinclair is a 33 y.o. male that presents for:     Chief Complaint   Patient presents with    Forms     Patient is coming in for medical forms to be filled out for DMV. No other concerns.      Assessment/Plan:   Harley was seen today for forms.    Diagnoses and all orders for this visit:    Driving safety issue: Historically have filled out some of his DMV paperwork.  However, almost all of his papers have been completed by his psychiatrist and substance abuse specialist.  -For now I will defer to the last signature needed to one of the above specialists for completion  -He denies any relapses, which is confirmed by his paperwork/UDS as documented  -If he is unable to get the final signature, we made a plan for him to return paperwork to the  for me to complete       Follow up:   Return if symptoms worsen or fail to improve, for annual well visit.   Subjective:   HPI:  Harley Sinclair is a 33 y.o. male who presents to clinic for evaluation of:    Completion of DMV forms  Patient follows with Dr. Ambriz (substance abuse specialist) and Dr. Napier (psychiatry) completed most of the forms.  There was one section of the documentation that I believe to be more appropriate to be filled out by one of the 2 providers I just listed.  He is otherwise fully compliant with his medications and has not had any relapses.    Health Maintenance:  Health Maintenance Due   Topic Date Due    Hepatitis B vaccine (1 of 3 - 3-dose series) 1989    Varicella vaccine (1 of 2 - 2-dose childhood series) Never done    COVID-19 Vaccine (2 - 2023-24 season) 09/01/2023      ROS:   Patient otherwise denies fevers, chills, headaches, changes in vision, changes in hearing, chest pain, palpitations, shortness of breath, abdominal pain, nausea, vomiting, diarrhea, constipation, lower extremity edema, and numbness and

## 2024-05-29 NOTE — PROGRESS NOTES
Harley Sinclair is a 33 y.o. male      Chief Complaint   Patient presents with    Forms     Patient is coming in for medical forms to be filled out for DMV. No other concerns.        \"Have you been to the ER, urgent care clinic since your last visit?  Hospitalized since your last visit?\"    NO    “Have you seen or consulted any other health care providers outside of [facility] since your last visit?”    NO              Vitals:    05/29/24 0948   BP: (!) 114/52   Site: Right Upper Arm   Position: Sitting   Pulse: 61   Resp: 18   Temp: 97.8 °F (36.6 °C)   TempSrc: Oral   SpO2: 96%   Weight: 111.6 kg (246 lb)   Height: 1.854 m (6' 1\")            Health Maintenance Due   Topic Date Due    Hepatitis B vaccine (1 of 3 - 3-dose series) 1989    Varicella vaccine (1 of 2 - 2-dose childhood series) Never done    COVID-19 Vaccine (2 - 2023-24 season) 09/01/2023         Medication Reconciliation completed, changes noted.  Please  Update medication list.

## 2024-05-30 NOTE — PROGRESS NOTES
[facility] Attending Attestation: While the patient was in clinic or immediately following the patient leaving the clinic, I reviewed the patient's medical history, the resident's findings on physical examination, and the patient's diagnosis and treatment plan with the resident and agree with the documentation in the note.     Haresh Bennett MD

## 2024-12-20 ENCOUNTER — OFFICE VISIT (OUTPATIENT)
Age: 34
End: 2024-12-20
Payer: MEDICAID

## 2024-12-20 VITALS
HEART RATE: 64 BPM | WEIGHT: 236.2 LBS | HEIGHT: 73 IN | SYSTOLIC BLOOD PRESSURE: 124 MMHG | BODY MASS INDEX: 31.3 KG/M2 | OXYGEN SATURATION: 95 % | TEMPERATURE: 98.7 F | DIASTOLIC BLOOD PRESSURE: 74 MMHG

## 2024-12-20 DIAGNOSIS — Z91.89 DRIVING SAFETY ISSUE: ICD-10-CM

## 2024-12-20 DIAGNOSIS — Z00.00 VISIT FOR WELL MAN HEALTH CHECK: Primary | ICD-10-CM

## 2024-12-20 PROCEDURE — 99213 OFFICE O/P EST LOW 20 MIN: CPT

## 2024-12-20 PROCEDURE — 99395 PREV VISIT EST AGE 18-39: CPT

## 2024-12-20 NOTE — PROGRESS NOTES
[address] [place] [address]   Office [phone], Fax [phone]    ROUTINE ANNUAL WELL MALE EXAM  Subjective   Harley Sinclair is a 34 y.o. male who presents to clinic today for annual physical exam.      He would also like to address the following issues:      Completion of DMV forms  Patient follows with Dr. Ambriz (substance abuse specialist) and Dr. Napier (psychiatry) completed most of the forms.  There was one section of the documentation that I believe to be more appropriate to be filled out by one of the 2 providers I just listed.  He is otherwise fully compliant with his medications and has not had any relapses.  Patient has past medical history significant for bipolar 1 disorder,  anxiety and depression, adjustment disorder with disturbance of conduct, and heroin use.  Patient reports compliance with Suboxone treatment since roughly Sept of 2022.        Diet: regular American diet  Exercise: limited, plays outside with son often  Tobacco use: Vaping  Alcohol use: Denies current alcohol use   Drug use: Denies current drug use    Review of Systems:  Patient otherwise denies fevers, chills, headaches, changes in vision, changes in hearing, chest pain, palpitations, shortness of breath, abdominal pain, nausea, vomiting, diarrhea, constipation, lower extremity edema, and numbness and tingling in extremities.     Preventative care:  18:  Alcohol abuse screening (CAGE): 0    Depression screening: PHQ2 is 0      Past Medical History  Past Medical History:   Diagnosis Date    Anxiety and depression     Bipolar 1 disorder (HCC)     Depression     Hypertension     Psychiatric disorder     bipolar       Allergies  No Known Allergies    Current Medications  Current Outpatient Medications on File Prior to Visit   Medication Sig Dispense Refill    buprenorphine-naloxone (SUBOXONE) 8-2 MG FILM SL film PLACE 1 STRIP UNDER THE TONGUE THREE TIMES DAILY      cloNIDine (CATAPRES) 0.1 MG tablet Take 1

## 2024-12-20 NOTE — PROGRESS NOTES
Harley Sinclair is a 34 y.o. male      Chief Complaint   Patient presents with    Follow-up     DMV form completion.        \"Have you been to the ER, urgent care clinic since your last visit?  Hospitalized since your last visit?\"    NO    “Have you seen or consulted any other health care providers outside of [facility] since your last visit?”    NO            Click Here for Release of Records Request    Vitals:    12/20/24 1505   BP: 124/74   Site: Right Upper Arm   Position: Sitting   Cuff Size: Large Adult   Pulse: 64   Temp: 98.7 °F (37.1 °C)   TempSrc: Oral   SpO2: 95%   Weight: 107.1 kg (236 lb 3.2 oz)   Height: 1.854 m (6' 0.99\")           Medication Reconciliation Completed, changes notes. Please Update medication list.

## 2024-12-24 NOTE — PROGRESS NOTES
I reviewed with the resident the medical history and the resident's findings on the physical examination.  I discussed with the resident the patient's diagnosis and concur with the plan.     Perla Bruce MD 12/24/2024